# Patient Record
Sex: MALE | Race: WHITE | Employment: OTHER | URBAN - METROPOLITAN AREA
[De-identification: names, ages, dates, MRNs, and addresses within clinical notes are randomized per-mention and may not be internally consistent; named-entity substitution may affect disease eponyms.]

---

## 2017-01-24 ENCOUNTER — TELEPHONE (OUTPATIENT)
Dept: FAMILY MEDICINE CLINIC | Age: 67
End: 2017-01-24

## 2017-01-24 RX ORDER — AMITRIPTYLINE HYDROCHLORIDE 50 MG/1
50 TABLET, FILM COATED ORAL
Qty: 90 TAB | Refills: 3 | Status: SHIPPED | OUTPATIENT
Start: 2017-01-24 | End: 2018-01-15 | Stop reason: SDUPTHER

## 2017-01-24 NOTE — TELEPHONE ENCOUNTER
Patient brought in copy of pneumovax and zostavax shots adminitered at [facility] to put in his file.

## 2017-01-24 NOTE — TELEPHONE ENCOUNTER
Patient would like to request a 6 month refill on amitriptyline. Please call [facility] for refill on [address].  Please call patient at: [phone]

## 2017-02-03 ENCOUNTER — OFFICE VISIT (OUTPATIENT)
Dept: FAMILY MEDICINE CLINIC | Age: 67
End: 2017-02-03

## 2017-02-03 VITALS
BODY MASS INDEX: 23.49 KG/M2 | RESPIRATION RATE: 18 BRPM | HEART RATE: 78 BPM | DIASTOLIC BLOOD PRESSURE: 88 MMHG | OXYGEN SATURATION: 99 % | SYSTOLIC BLOOD PRESSURE: 138 MMHG | HEIGHT: 68 IN | TEMPERATURE: 98.2 F | WEIGHT: 155 LBS

## 2017-02-03 DIAGNOSIS — J01.00 ACUTE NON-RECURRENT MAXILLARY SINUSITIS: Primary | ICD-10-CM

## 2017-02-03 RX ORDER — AZITHROMYCIN 250 MG/1
TABLET, FILM COATED ORAL
Qty: 6 TAB | Refills: 0 | Status: SHIPPED | OUTPATIENT
Start: 2017-02-03 | End: 2017-03-02 | Stop reason: ALTCHOICE

## 2017-02-03 NOTE — PATIENT INSTRUCTIONS
Sinusitis: Care Instructions  Your Care Instructions    Sinusitis is an infection of the lining of the sinus cavities in your head. Sinusitis often follows a cold. It causes pain and pressure in your head and face. In most cases, sinusitis gets better on its own in 1 to 2 weeks. But some mild symptoms may last for several weeks. Sometimes antibiotics are needed. Follow-up care is a key part of your treatment and safety. Be sure to make and go to all appointments, and call your doctor if you are having problems. It's also a good idea to know your test results and keep a list of the medicines you take. How can you care for yourself at home? · Take an over-the-counter pain medicine, such as acetaminophen (Tylenol), ibuprofen (Advil, Motrin), or naproxen (Aleve). Read and follow all instructions on the label. · If the doctor prescribed antibiotics, take them as directed. Do not stop taking them just because you feel better. You need to take the full course of antibiotics. · Be careful when taking over-the-counter cold or flu medicines and Tylenol at the same time. Many of these medicines have acetaminophen, which is Tylenol. Read the labels to make sure that you are not taking more than the recommended dose. Too much acetaminophen (Tylenol) can be harmful. · Breathe warm, moist air from a steamy shower, a hot bath, or a sink filled with hot water. Avoid cold, dry air. Using a humidifier in your home may help. Follow the directions for cleaning the machine. · Use saline (saltwater) nasal washes to help keep your nasal passages open and wash out mucus and bacteria. You can buy saline nose drops at a grocery store or drugstore. Or you can make your own at home by adding 1 teaspoon of salt and 1 teaspoon of baking soda to 2 cups of distilled water. If you make your own, fill a bulb syringe with the solution, insert the tip into your nostril, and squeeze gently. Remy Opitz your nose.   · Put a hot, wet towel or a warm gel pack on your face 3 or 4 times a day for 5 to 10 minutes each time. · Try a decongestant nasal spray like oxymetazoline (Afrin). Do not use it for more than 3 days in a row. Using it for more than 3 days can make your congestion worse. When should you call for help? Call your doctor now or seek immediate medical care if:  · You have new or worse swelling or redness in your face or around your eyes. · You have a new or higher fever. Watch closely for changes in your health, and be sure to contact your doctor if:  · You have new or worse facial pain. · The mucus from your nose becomes thicker (like pus) or has new blood in it. · You are not getting better as expected. Where can you learn more? Go to http://paz-sophie.info/. Enter B285 in the search box to learn more about \"Sinusitis: Care Instructions. \"  Current as of: July 29, 2016  Content Version: 11.1  © [id number] [employer]. Care instructions adapted under license by [employer] (which disclaims liability or warranty for this information). If you have questions about a medical condition or this instruction, always ask your healthcare professional. [place] [address] any warranty or liability for your use of this information.

## 2017-02-03 NOTE — PROGRESS NOTES
Ana Borja is a 78 y.o. male   Chief Complaint   Patient presents with    Cold Symptoms    pt states that he was sick for past two weeks then started getting better. Has been having a lot of sinus congestion, no pain tho. Has felt feverish. Mucous is very thick gel like pt states and did have some green it. Pt feels worn down. Has not had a fever tho. Pt has been using tylenol cold and flu without relief.  + cough non prod is having sinus drainage. Chief Complaint   Patient presents with    Cold Symptoms     he is a 78y.o. year old male who presents for evalution. Reviewed PmHx, RxHx, FmHx, SocHx, AllgHx and updated and dated in the chart. Review of Systems - negative except as listed above in the HPI    Objective:     Vitals:    02/03/17 0913   BP: 138/88   Pulse: 78   Resp: 18   Temp: 98.2 °F (36.8 °C)   TempSrc: Oral   SpO2: 99%   Weight: 155 lb (70.3 kg)   Height: 5' 8\" (1.727 m)       Current Outpatient Prescriptions   Medication Sig    azithromycin (ZITHROMAX) 250 mg tablet Take 2 tablets today, then take 1 tablet daily    amitriptyline (ELAVIL) 50 mg tablet Take 1 Tab by mouth nightly.  levothyroxine (LEVOXYL) 175 mcg tablet Take 175 mcg by mouth Daily (before breakfast).  lisinopril (PRINIVIL, ZESTRIL) 10 mg tablet Take 1 Tab by mouth daily.  levocetirizine (XYZAL) 5 mg tablet Take 1 Tab by mouth daily.  hydrochlorothiazide (MICROZIDE) 12.5 mg capsule Take 1 Cap by mouth daily.  fluticasone (FLONASE) 50 mcg/actuation nasal spray 2 Sprays by Both Nostrils route daily. Administer to right and left nostrils. No current facility-administered medications for this visit.         Physical Examination: General appearance - alert, well appearing, and in no distress  Eyes - pupils equal and reactive, extraocular eye movements intact  Ears - bilateral TM's and external ear canals normal  Nose - mucosal congestion and mucosal erythema  Mouth - mucous membranes moist, pharynx normal without lesions  Neck - supple, no significant adenopathy  Chest - clear to auscultation, no wheezes, rales or rhonchi, symmetric air entry  Heart - normal rate, regular rhythm, normal S1, S2, no murmurs, rubs, clicks or gallops      Assessment/ Plan:   Marsha Vega was seen today for cold symptoms. Diagnoses and all orders for this visit:    Acute non-recurrent maxillary sinusitis  -     azithromycin (ZITHROMAX) 250 mg tablet; Take 2 tablets today, then take 1 tablet daily       Follow-up Disposition:  Return if symptoms worsen or fail to improve. I have discussed the diagnosis with the patient and the intended plan as seen in the above orders. The patient has received an after-visit summary and questions were answered concerning future plans. Pt conveyed understanding of plan.     Medication Side Effects and Warnings were discussed with patient      Calos Mills DO

## 2017-03-02 ENCOUNTER — HOSPITAL ENCOUNTER (OUTPATIENT)
Dept: LAB | Age: 67
Discharge: HOME | End: 2017-03-02
Payer: MEDICARE

## 2017-03-02 ENCOUNTER — OFFICE VISIT (OUTPATIENT)
Dept: FAMILY MEDICINE CLINIC | Age: 67
End: 2017-03-02

## 2017-03-02 VITALS
HEART RATE: 69 BPM | HEIGHT: 68 IN | TEMPERATURE: 97.5 F | OXYGEN SATURATION: 97 % | SYSTOLIC BLOOD PRESSURE: 136 MMHG | DIASTOLIC BLOOD PRESSURE: 100 MMHG | RESPIRATION RATE: 12 BRPM | BODY MASS INDEX: 23.95 KG/M2 | WEIGHT: 158 LBS

## 2017-03-02 DIAGNOSIS — E07.9 THYROID DISEASE: ICD-10-CM

## 2017-03-02 DIAGNOSIS — I10 ESSENTIAL HYPERTENSION: ICD-10-CM

## 2017-03-02 DIAGNOSIS — Z00.00 ROUTINE GENERAL MEDICAL EXAMINATION AT A HEALTH CARE FACILITY: Primary | ICD-10-CM

## 2017-03-02 PROCEDURE — 86803 HEPATITIS C AB TEST: CPT

## 2017-03-02 PROCEDURE — 80053 COMPREHEN METABOLIC PANEL: CPT

## 2017-03-02 PROCEDURE — 84153 ASSAY OF PSA TOTAL: CPT

## 2017-03-02 PROCEDURE — 80061 LIPID PANEL: CPT

## 2017-03-02 RX ORDER — LISINOPRIL AND HYDROCHLOROTHIAZIDE 10; 12.5 MG/1; MG/1
1 TABLET ORAL DAILY
Qty: 90 TAB | Refills: 3 | Status: SHIPPED | OUTPATIENT
Start: 2017-03-02 | End: 2018-02-01 | Stop reason: SINTOL

## 2017-03-02 RX ORDER — LEVOCETIRIZINE DIHYDROCHLORIDE 5 MG/1
5 TABLET, FILM COATED ORAL DAILY
Qty: 30 TAB | Refills: 11 | Status: SHIPPED | OUTPATIENT
Start: 2017-03-02 | End: 2018-03-08 | Stop reason: SDUPTHER

## 2017-03-02 NOTE — MR AVS SNAPSHOT
Visit Information Date & Time Provider Department Dept. Phone Encounter #  
 3/2/2017  7:45 AM Cynthia Klein MD [facility] [phone] [id number] Upcoming Health Maintenance Date Due Hepatitis C Screening 1950 GLAUCOMA SCREENING Q2Y 2/1/2018 MEDICARE YEARLY EXAM 3/3/2018 COLONOSCOPY 5/19/2019 DTaP/Tdap/Td series (2 - Td) 3/2/2027 Allergies as of 3/2/2017  Review Complete On: 3/2/2017 By: Cynthia Klein MD  
 No Known Allergies Current Immunizations  Reviewed on 12/8/2016 Name Date Influenza Vaccine 1/2/2017, 10/5/2015 Pneumococcal Conjugate (PCV-13) 1/2/2017 Pneumococcal Polysaccharide (PPSV-23) 10/20/2015 Zoster Vaccine, Live 1/21/2015 Not reviewed this visit You Were Diagnosed With   
  
 Codes Comments Routine general medical examination at a health care facility    -  Primary ICD-10-CM: Z00.00 ICD-9-CM: V70.0 Thyroid disease     ICD-10-CM: E07.9 ICD-9-CM: 246.9 Essential hypertension     ICD-10-CM: I10 
ICD-9-CM: 401.9 Vitals BP  
  
  
  
  
  
 (!) 136/100 Vitals History BMI and BSA Data Body Mass Index Body Surface Area 24.02 kg/m 2 1.85 m 2 Preferred Pharmacy Pharmacy Name Phone [facility] [address] [place] [address] [address] [place] [address] [phone] Your Updated Medication List  
  
   
This list is accurate as of: 3/2/17  8:04 AM.  Always use your most recent med list.  
  
  
  
  
 amitriptyline 50 mg tablet Commonly known as:  ELAVIL Take 1 Tab by mouth nightly. fluticasone 50 mcg/actuation nasal spray Commonly known as:  Gretchen Mayesizzard 2 Sprays by Both Nostrils route daily. Administer to right and left nostrils. levocetirizine 5 mg tablet Commonly known as:  Emanuel Dakins Take 1 Tab by mouth daily. LEVOXYL 175 mcg tablet Generic drug:  levothyroxine Take 175 mcg by mouth Daily (before breakfast). lisinopril-hydroCHLOROthiazide 10-12.5 mg per tablet Commonly known as:  Floyce Plenty Take 1 Tab by mouth daily. Prescriptions Sent to Pharmacy Refills  
 levocetirizine (XYZAL) 5 mg tablet 11 Sig: Take 1 Tab by mouth daily. Class: Normal  
 Pharm[facility]: [address] [facility] [address] Ph #: [phone] Route: Oral  
 lisinopril-hydroCHLOROthiazide (PRINZIDE, ZESTORETIC) 10-12.5 mg per tablet 3 Sig: Take 1 Tab by mouth daily. Class: Normal  
 Pharmacy: [id number] [facility] [address] Ph #: [phone] Route: Oral  
  
We Performed the Following HEPATITIS C AB [62716 CPT(R)] LIPID PANEL [06724 CPT(R)] METABOLIC PANEL, COMPREHENSIVE [40451 CPT(R)] PSA W/ REFLX FREE PSA [79094 CPT(R)] Introducing [facility] & [facility] SERVICES! Dear Anders Cervantes: Thank you for requesting a [employer] account. Our records indicate that you already have an active [employer] account. You can access your account anytime at [email or web address] Did you know that you can access your hospital and ER discharge instructions at any time in Zak[employer]? You can also review all of your test results from your hospital stay or ER visit. Additional Information If you have questions, please visit the Frequently Asked Questions section of the [employer] website at [email or web address] Remember, Zak[employer] is NOT to be used for urgent needs. For medical emergencies, dial 911. Now available from your iPhone and Android! Please provide this summary of care documentation to your next provider. Your primary care clinician is listed as Hector Ocasio. If you have any questions after today's visit, please call [phone].

## 2017-03-02 NOTE — PROGRESS NOTES
Here for AWV. States that he is still having congestion since his last visit. Would like to discuss his BP. States that he has been having short term memory loss for the past year.

## 2017-03-02 NOTE — PROGRESS NOTES
Here for AWV. States that he is still having congestion since his last visit. Would like to discuss his BP. States that he has been having short term memory loss for the past year. This is a Subsequent Medicare Annual Wellness Visit providing Personalized Prevention Plan Services (PPPS) (Performed 12 months after initial AWV and PPPS )    I have reviewed the patient's medical history in detail and updated the computerized patient record. History     Past Medical History:   Diagnosis Date    Hypertension     Thyroid disease       Past Surgical History:   Procedure Laterality Date    HX LIPOMA RESECTION      left shoulder    HX ORTHOPAEDIC      left shoulder     Current Outpatient Prescriptions   Medication Sig Dispense Refill    amitriptyline (ELAVIL) 50 mg tablet Take 1 Tab by mouth nightly. 90 Tab 3    levothyroxine (LEVOXYL) 175 mcg tablet Take 175 mcg by mouth Daily (before breakfast).  lisinopril (PRINIVIL, ZESTRIL) 10 mg tablet Take 1 Tab by mouth daily. 90 Tab 0    fluticasone (FLONASE) 50 mcg/actuation nasal spray 2 Sprays by Both Nostrils route daily. Administer to right and left nostrils. 1 Bottle 5    levocetirizine (XYZAL) 5 mg tablet Take 1 Tab by mouth daily. 30 Tab 11    hydrochlorothiazide (MICROZIDE) 12.5 mg capsule Take 1 Cap by mouth daily.  90 Cap 3     No Known Allergies  Family History   Problem Relation Age of Onset    Heart Disease Mother     Heart Attack Father      Social History   Substance Use Topics    Smoking status: Former Smoker    Smokeless tobacco: Never Used    Alcohol use Yes     Patient Active Problem List   Diagnosis Code    Thyroid disease E07.9    Hypertension I10       Depression Risk Factor Screening:     PHQ 2 / 9, over the last two weeks 3/2/2017   Little interest or pleasure in doing things Not at all   Feeling down, depressed or hopeless Not at all   Total Score PHQ 2 0     Alcohol Risk Factor Screening:   Pt drink 2-3 beers per night, every night    Functional Ability and Level of Safety:     Hearing Loss   normal-to-mild    Activities of Daily Living   Self-care. Requires assistance with: no ADLs    Fall Risk     Fall Risk Assessment, last 12 mths 3/2/2017   Able to walk? Yes   Fall in past 12 months? No     Abuse Screen   Patient is not abused    Review of Systems   A comprehensive review of systems was negative except for that written in the HPI. Physical Examination     Evaluation of Cognitive Function:  Mood/affect:  happy  Appearance: age appropriate  Family member/caregiver input: Pt lives alone    Visit Vitals    BP (!) 136/100    Pulse 69    Temp 97.5 °F (36.4 °C)    Resp 12    Ht 5' 8\" (1.727 m)    Wt 158 lb (71.7 kg)    SpO2 97%    BMI 24.02 kg/m2     General appearance: alert, cooperative, no distress, appears stated age  Head: Normocephalic, without obvious abnormality, atraumatic  Throat: Lips, mucosa, and tongue normal. Teeth and gums normal  Lungs: clear to auscultation bilaterally  Heart: regular rate and rhythm, S1, S2 normal, no murmur, click, rub or gallop  Extremities: extremities normal, atraumatic, no cyanosis or edema    Patient Care Team:  Beny Rodney MD as PCP - General (Family Practice)    Advice/Referrals/Counseling   Education and counseling provided:  Are appropriate based on today's review and evaluation      Assessment/Plan       ICD-10-CM ICD-9-CM    1. Routine general medical examination at a health care facility Z00.00 V70.0 HEPATITIS C AB      LIPID PANEL      METABOLIC PANEL, COMPREHENSIVE      PSA W/ REFLX FREE PSA   2. Thyroid disease E07.9 246.9    3. Essential hypertension I10 401.9      Encounter Diagnoses   Name Primary?     Routine general medical examination at a health care facility Yes    Thyroid disease     Essential hypertension      Orders Placed This Encounter    HEPATITIS C AB    LIPID PANEL    METABOLIC PANEL, COMPREHENSIVE    PSA W/ REFLX FREE PSA    levocetirizine (XYZAL) 5 mg tablet    lisinopril-hydroCHLOROthiazide (PRINZIDE, ZESTORETIC) 10-12.5 mg per tablet   .

## 2017-03-03 LAB
ALBUMIN SERPL-MCNC: 4.8 G/DL (ref 3.6–4.8)
ALBUMIN/GLOB SERPL: 2.7 {RATIO} (ref 1.1–2.5)
ALP SERPL-CCNC: 55 IU/L (ref 39–117)
ALT SERPL-CCNC: 15 IU/L (ref 0–44)
AST SERPL-CCNC: 18 IU/L (ref 0–40)
BILIRUB SERPL-MCNC: 0.5 MG/DL (ref 0–1.2)
BUN SERPL-MCNC: 17 MG/DL (ref 8–27)
BUN/CREAT SERPL: 16 (ref 10–22)
CALCIUM SERPL-MCNC: 10 MG/DL (ref 8.6–10.2)
CHLORIDE SERPL-SCNC: 94 MMOL/L (ref 96–106)
CHOLEST SERPL-MCNC: 201 MG/DL (ref 100–199)
CO2 SERPL-SCNC: 25 MMOL/L (ref 18–29)
CREAT SERPL-MCNC: 1.09 MG/DL (ref 0.76–1.27)
GLOBULIN SER CALC-MCNC: 1.8 G/DL (ref 1.5–4.5)
GLUCOSE SERPL-MCNC: 67 MG/DL (ref 65–99)
HCV AB S/CO SERPL IA: 0.1 S/CO RATIO (ref 0–0.9)
HDLC SERPL-MCNC: 54 MG/DL
INTERPRETATION, 910389: NORMAL
LDLC SERPL CALC-MCNC: 113 MG/DL (ref 0–99)
POTASSIUM SERPL-SCNC: 4.4 MMOL/L (ref 3.5–5.2)
PROT SERPL-MCNC: 6.6 G/DL (ref 6–8.5)
PSA SERPL-MCNC: 2.3 NG/ML (ref 0–4)
REFLEX CRITERIA: NORMAL
SODIUM SERPL-SCNC: 135 MMOL/L (ref 134–144)
TRIGL SERPL-MCNC: 170 MG/DL (ref 0–149)
VLDLC SERPL CALC-MCNC: 34 MG/DL (ref 5–40)

## 2017-03-06 NOTE — PROGRESS NOTES
Cholesterol is elevated, please closely monitor diet and increase exercise, recheck in 6 months. All other labs are within normal limits. A message has been sent in [employer] and the lab work released to the patient.

## 2017-06-06 ENCOUNTER — OFFICE VISIT (OUTPATIENT)
Dept: FAMILY MEDICINE CLINIC | Age: 67
End: 2017-06-06

## 2017-06-06 VITALS
HEART RATE: 79 BPM | RESPIRATION RATE: 18 BRPM | OXYGEN SATURATION: 98 % | DIASTOLIC BLOOD PRESSURE: 86 MMHG | TEMPERATURE: 98.3 F | BODY MASS INDEX: 24.25 KG/M2 | SYSTOLIC BLOOD PRESSURE: 125 MMHG | HEIGHT: 68 IN | WEIGHT: 160 LBS

## 2017-06-06 DIAGNOSIS — N62 BREAST HYPERTROPHY: ICD-10-CM

## 2017-06-06 DIAGNOSIS — J06.9 UPPER RESPIRATORY TRACT INFECTION, UNSPECIFIED TYPE: Primary | ICD-10-CM

## 2017-06-06 RX ORDER — AZITHROMYCIN 250 MG/1
TABLET, FILM COATED ORAL
Qty: 6 TAB | Refills: 0 | Status: SHIPPED | OUTPATIENT
Start: 2017-06-06 | End: 2017-06-11

## 2017-06-06 NOTE — MR AVS SNAPSHOT
Visit Information Date & Time Provider Department Dept. Phone Encounter #  
 6/6/2017 10:40 AM Kenyon Alexander MD [address] [facility] [phone] [id number] Upcoming Health Maintenance Date Due INFLUENZA AGE 9 TO ADULT 8/1/2017 GLAUCOMA SCREENING Q2Y 2/1/2018 MEDICARE YEARLY EXAM 3/3/2018 COLONOSCOPY 5/19/2019 DTaP/Tdap/Td series (2 - Td) 3/2/2027 Allergies as of 6/6/2017  Review Complete On: 6/6/2017 By: Kenyon Alexander MD  
 No Known Allergies Current Immunizations  Reviewed on 12/8/2016 Name Date Influenza Vaccine 1/2/2017, 10/5/2015 Pneumococcal Conjugate (PCV-13) 1/2/2017 Pneumococcal Polysaccharide (PPSV-23) 10/20/2015 Zoster Vaccine, Live 1/21/2015 Not reviewed this visit You Were Diagnosed With   
  
 Codes Comments Upper respiratory tract infection, unspecified type    -  Primary ICD-10-CM: J06.9 ICD-9-CM: 465.9 Breast hypertrophy     ICD-10-CM: N62 
ICD-9-CM: 611.1 Vitals BP Pulse Temp Resp Height(growth percentile) Weight(growth percentile) 125/86 (BP 1 Location: Left arm, BP Patient Position: Sitting) 79 98.3 °F (36.8 °C) (Oral) 18 5' 8\" (1.727 m) 160 lb (72.6 kg) SpO2 BMI Smoking Status 98% 24.33 kg/m2 Former Smoker Vitals History BMI and BSA Data Body Mass Index Body Surface Area  
 24.33 kg/m 2 1.87 m 2 Preferred Pharmacy Pharmacy Name Phone [facility] [address] [address] [address] [place] [address] [phone] Your Updated Medication List  
  
   
This list is accurate as of: 6/6/17 11:49 AM.  Always use your most recent med list.  
  
  
  
  
 amitriptyline 50 mg tablet Commonly known as:  ELAVIL Take 1 Tab by mouth nightly. azithromycin 250 mg tablet Commonly known as:  Elin Olivier Take 2 tablets today, then take 1 tablet daily  
  
 fluticasone 50 mcg/actuation nasal spray Commonly known as:  Ana Maria Marinelli 2 Sprays by Both Nostrils route daily. Administer to right and left nostrils. levocetirizine 5 mg tablet Commonly known as:  Mathew Drones Take 1 Tab by mouth daily. LEVOXYL 175 mcg tablet Generic drug:  levothyroxine Take 175 mcg by mouth Daily (before breakfast). lisinopril-hydroCHLOROthiazide 10-12.5 mg per tablet Commonly known as:  Jake Quintanillamond Take 1 Tab by mouth daily. Prescriptions Sent to Pharmacy Refills  
 azithromycin (ZITHROMAX) 250 mg tablet 0 Sig: Take 2 tablets today, then take 1 tablet daily Class: Normal  
 Pharmacy: [address] [place] [address] Ph #: [phone] To-Do List   
 06/06/2017 Imaging:  US BREAST LT COMPLETE 4 QUAD Introducing [facility] & [facility] SERVICES! Dear Wendy Chong: Thank you for requesting a [employer] account. Our records indicate that you already have an active [employer] account. You can access your account anytime at [email or web address] Did you know that you can access your hospital and ER discharge instructions at any time in [employer]? You can also review all of your test results from your hospital stay or ER visit. Additional Information If you have questions, please visit the Frequently Asked Questions section of the [employer] website at [email or web address] Remember, [employer] is NOT to be used for urgent needs. For medical emergencies, dial 911. Now available from your iPhone and Android! Please provide this summary of care documentation to your next provider. Your primary care clinician is listed as Hector Ocasio. If you have any questions after today's visit, please call [phone].

## 2017-06-06 NOTE — PROGRESS NOTES
Pt here c/o cough, congestion, and sore throat x 5 days. States that cough has been productive with thick mucus. Has been taking Tylenol OTC. Reports being afebrile.

## 2017-06-06 NOTE — PROGRESS NOTES
Pt here c/o cough, congestion, and sore throat x 5 days. States that cough has been productive with thick mucus. Has been taking Tylenol OTC. Reports being afebrile. Subjective: (As above and below)   No chief complaint on file. he is a 78y.o. year old male who presents for evaluation. Reviewed PmHx, RxHx, FmHx, SocHx, AllgHx and updated in chart. Review of Systems - negative except as listed above    Objective:     Vitals:    06/06/17 1055   BP: 125/86   Pulse: 79   Resp: 18   Temp: 98.3 °F (36.8 °C)   TempSrc: Oral   SpO2: 98%   Weight: 160 lb (72.6 kg)   Height: 5' 8\" (1.727 m)     Physical Examination: General appearance - alert, well appearing, and in no distress  Mental status - normal mood, behavior, speech, dress, motor activity, and thought processes  Eyes - pupils equal and reactive, extraocular eye movements intact  Ears - bilateral TM's and external ear canals normal  Nose - normal and patent, no erythema, discharge or polyps  Mouth - mucous membranes moist, pharynx normal without lesions  Chest - chest tightness, slight wheeze  -left pectoral prominence as compared to right  Heart - normal rate, regular rhythm, normal S1, S2, no murmurs, rubs, clicks or gallops    Assessment/ Plan:   1. Upper respiratory tract infection, unspecified type  -zpak wait and see prescription provided    2. Breast hypertrophy  -check US  - US BREAST LT COMPLETE 4 QUAD; Future     Follow-up Disposition: As needed  I have discussed the diagnosis with the patient and the intended plan as seen in the above orders. The patient has received an after-visit summary and questions were answered concerning future plans.      Medication Side Effects and Warnings were discussed with patient: yes  Patient Labs were reviewed: yes  Patient Past Records were reviewed:  yes    Christiane Sacks, M.D.

## 2017-06-09 ENCOUNTER — HOSPITAL ENCOUNTER (OUTPATIENT)
Dept: ULTRASOUND IMAGING | Age: 67
Discharge: HOME | End: 2017-06-09
Attending: FAMILY MEDICINE
Payer: MEDICARE

## 2017-06-09 ENCOUNTER — HOSPITAL ENCOUNTER (OUTPATIENT)
Dept: MAMMOGRAPHY | Age: 67
Discharge: HOME | End: 2017-06-09
Attending: FAMILY MEDICINE
Payer: MEDICARE

## 2017-06-09 DIAGNOSIS — N62 BREAST HYPERTROPHY: ICD-10-CM

## 2017-06-09 PROCEDURE — 77066 DX MAMMO INCL CAD BI: CPT

## 2017-06-09 PROCEDURE — 76642 ULTRASOUND BREAST LIMITED: CPT

## 2017-06-19 ENCOUNTER — OFFICE VISIT (OUTPATIENT)
Dept: FAMILY MEDICINE CLINIC | Age: 67
End: 2017-06-19

## 2017-06-19 ENCOUNTER — HOSPITAL ENCOUNTER (OUTPATIENT)
Dept: LAB | Age: 67
Discharge: HOME | End: 2017-06-19
Payer: MEDICARE

## 2017-06-19 VITALS — WEIGHT: 158 LBS | HEIGHT: 68 IN | RESPIRATION RATE: 18 BRPM | OXYGEN SATURATION: 98 % | BODY MASS INDEX: 23.95 KG/M2

## 2017-06-19 DIAGNOSIS — N62 GYNECOMASTIA, MALE: Primary | ICD-10-CM

## 2017-06-19 PROCEDURE — 84146 ASSAY OF PROLACTIN: CPT

## 2017-06-19 PROCEDURE — 82679 ASSAY OF ESTRONE: CPT

## 2017-06-19 PROCEDURE — 84443 ASSAY THYROID STIM HORMONE: CPT

## 2017-06-19 PROCEDURE — 83002 ASSAY OF GONADOTROPIN (LH): CPT

## 2017-06-19 PROCEDURE — 84403 ASSAY OF TOTAL TESTOSTERONE: CPT

## 2017-06-19 NOTE — MR AVS SNAPSHOT
Visit Information Date & Time Provider Department Dept. Phone Encounter #  
 6/19/2017 11:30 AM Ashanti Cook MD [facility] [phone] [id number] Upcoming Health Maintenance Date Due INFLUENZA AGE 9 TO ADULT 8/1/2017 GLAUCOMA SCREENING Q2Y 2/1/2018 MEDICARE YEARLY EXAM 3/3/2018 COLONOSCOPY 5/19/2019 DTaP/Tdap/Td series (2 - Td) 3/2/2027 Allergies as of 6/19/2017  Review Complete On: 6/19/2017 By: Ashanti Cook MD  
 No Known Allergies Current Immunizations  Reviewed on 12/8/2016 Name Date Influenza Vaccine 1/2/2017, 10/5/2015 Pneumococcal Conjugate (PCV-13) 1/2/2017 Pneumococcal Polysaccharide (PPSV-23) 10/20/2015 Zoster Vaccine, Live 1/21/2015 Not reviewed this visit You Were Diagnosed With   
  
 Codes Comments Gynecomastia, male    -  Primary ICD-10-CM: N62 
ICD-9-CM: 611.1 Vitals Resp Height(growth percentile) Weight(growth percentile) SpO2 BMI Smoking Status 18 5' 8\" (1.727 m) 158 lb (71.7 kg) 98% 24.02 kg/m2 Former Smoker BMI and BSA Data Body Mass Index Body Surface Area 24.02 kg/m 2 1.85 m 2 Preferred Pharmacy Pharmacy Name Phone [facility] [address] [place] [address] [phone] Your Updated Medication List  
  
   
This list is accurate as of: 6/19/17 11:43 AM.  Always use your most recent med list.  
  
  
  
  
 amitriptyline 50 mg tablet Commonly known as:  ELAVIL Take 1 Tab by mouth nightly. fluticasone 50 mcg/actuation nasal spray Commonly known as:  Wilbern Davi 2 Sprays by Both Nostrils route daily. Administer to right and left nostrils. levocetirizine 5 mg tablet Commonly known as:  Diantha Many Take 1 Tab by mouth daily. LEVOXYL 175 mcg tablet Generic drug:  levothyroxine Take 175 mcg by mouth Daily (before breakfast). lisinopril-hydroCHLOROthiazide 10-12.5 mg per tablet Commonly known as:  Kristie Palm Take 1 Tab by mouth daily. We Performed the Following ESTROGENS, FRACTIONATED [id number] CPT(R)] [facility] AND LH [02610 CPT(R)] PROLACTIN [21388 CPT(R)] TESTOSTERONE, FREE/TOT EQUILIB [id number] CPT(R)] TSH 3RD GENERATION [34864 CPT(R)] Introducing [facility] & [facility] SERVICES! Dear Kamila Burnett: Thank you for requesting a [email or web address] account. Our records indicate that you already have an active [email or web address] account. You can access your account anytime at [email or web address] Did you know that you can access your hospital and ER discharge instructions at any time in [email or web address]? You can also review all of your test results from your hospital stay or ER visit. Additional Information If you have questions, please visit the Frequently Asked Questions section of the [email or web address] website at [email or web address] Remember, [email or web address] is NOT to be used for urgent needs. For medical emergencies, dial 911. Now available from your iPhone and Android! Please provide this summary of care documentation to your next provider. Your primary care clinician is listed as Hector Ocasio. If you have any questions after today's visit, please call [phone].

## 2017-06-19 NOTE — PROGRESS NOTES
Pt here to follow up from recent mammogram and US. Would like to discuss having hormone testing. Subjective: (As above and below)   No chief complaint on file. he is a 78y.o. year old male who presents for evaluation. Reviewed PmHx, RxHx, FmHx, SocHx, AllgHx and updated in chart. Review of Systems - negative except as listed above    Objective:     Vitals:    06/19/17 1115   Resp: 18   SpO2: 98%   Weight: 158 lb (71.7 kg)   Height: 5' 8\" (1.727 m)     Physical Examination: General appearance - alert, well appearing, and in no distress  Mental status - normal mood, behavior, speech, dress, motor activity, and thought processes  Mouth - mucous membranes moist, pharynx normal without lesions  Chest - clear to auscultation, no wheezes, rales or rhonchi, symmetric air entry  Heart - normal rate, regular rhythm, normal S1, S2, no murmurs, rubs, clicks or gallops    Assessment/ Plan:   1. Gynecomastia, male  -check labs levels for unilateral gynecomastia   - TESTOSTERONE, FREE/TOT EQUILIB  - TSH 3RD GENERATION  - ESTROGENS, FRACTIONATED  - PROLACTIN  - FSH AND LH     Follow-up Disposition: As needed  I have discussed the diagnosis with the patient and the intended plan as seen in the above orders. The patient has received an after-visit summary and questions were answered concerning future plans.      Medication Side Effects and Warnings were discussed with patient: yes  Patient Labs were reviewed: yes  Patient Past Records were reviewed:  yes    Jeny Pro M.D.

## 2017-06-22 LAB
COMMENT: ABNORMAL
ESTRADIOL SERPL-MCNC: 27.2 PG/ML (ref 7.6–42.6)
ESTRONE SERPL-MCNC: 91 PG/ML (ref 12–72)
FSH SERPL-ACNC: 6.5 MIU/ML (ref 1.5–12.4)
LH SERPL-ACNC: 5.9 MIU/ML (ref 1.7–8.6)
PROLACTIN SERPL-MCNC: 5.3 NG/ML (ref 4–15.2)
TESTOST FREE MFR SERPL: 1.22 % (ref 1.5–4.2)
TESTOST FREE SERPL-MCNC: 6.42 NG/DL (ref 5–21)
TESTOST SERPL-MCNC: 526 NG/DL (ref 348–1197)
TSH SERPL DL<=0.005 MIU/L-ACNC: 3.36 UIU/ML (ref 0.45–4.5)

## 2017-06-22 NOTE — PROGRESS NOTES
Normal testosterone levels, slightly elevated estrogen levels. Recommend endocrinology follow up. Please inform and refer to Dr. Albert Raman if a referral is needed.

## 2018-01-15 RX ORDER — AMITRIPTYLINE HYDROCHLORIDE 50 MG/1
50 TABLET, FILM COATED ORAL
Qty: 90 TAB | Refills: 3 | Status: SHIPPED | OUTPATIENT
Start: 2018-01-15 | End: 2019-01-21 | Stop reason: SDUPTHER

## 2018-01-19 ENCOUNTER — DOCUMENTATION ONLY (OUTPATIENT)
Dept: FAMILY MEDICINE CLINIC | Age: 68
End: 2018-01-19

## 2018-01-19 NOTE — PROGRESS NOTES
Per pt's request I faxed a request to opt out of phytel reminder calls. Pt does not like them nor does he need them. I faxed it to [phone] confirmation rec'd and placed in folder.

## 2018-02-01 ENCOUNTER — OFFICE VISIT (OUTPATIENT)
Dept: FAMILY MEDICINE CLINIC | Age: 68
End: 2018-02-01

## 2018-02-01 ENCOUNTER — HOSPITAL ENCOUNTER (OUTPATIENT)
Dept: LAB | Age: 68
Discharge: HOME | End: 2018-02-01
Payer: MEDICARE

## 2018-02-01 VITALS
TEMPERATURE: 98.1 F | OXYGEN SATURATION: 98 % | HEIGHT: 68 IN | BODY MASS INDEX: 22.13 KG/M2 | HEART RATE: 90 BPM | WEIGHT: 146 LBS | DIASTOLIC BLOOD PRESSURE: 85 MMHG | SYSTOLIC BLOOD PRESSURE: 123 MMHG | RESPIRATION RATE: 18 BRPM

## 2018-02-01 DIAGNOSIS — Z23 ENCOUNTER FOR IMMUNIZATION: ICD-10-CM

## 2018-02-01 DIAGNOSIS — E78.00 ELEVATED CHOLESTEROL: ICD-10-CM

## 2018-02-01 DIAGNOSIS — I10 ESSENTIAL HYPERTENSION: Primary | ICD-10-CM

## 2018-02-01 DIAGNOSIS — R79.9 ABNORMAL FINDING OF BLOOD CHEMISTRY: ICD-10-CM

## 2018-02-01 DIAGNOSIS — Z00.00 MEDICARE ANNUAL WELLNESS VISIT, SUBSEQUENT: ICD-10-CM

## 2018-02-01 PROCEDURE — 80053 COMPREHEN METABOLIC PANEL: CPT

## 2018-02-01 PROCEDURE — 80061 LIPID PANEL: CPT

## 2018-02-01 PROCEDURE — 85025 COMPLETE CBC W/AUTO DIFF WBC: CPT

## 2018-02-01 PROCEDURE — 83036 HEMOGLOBIN GLYCOSYLATED A1C: CPT

## 2018-02-01 RX ORDER — LOSARTAN POTASSIUM AND HYDROCHLOROTHIAZIDE 12.5; 5 MG/1; MG/1
1 TABLET ORAL DAILY
Qty: 90 TAB | Refills: 3 | Status: SHIPPED | OUTPATIENT
Start: 2018-02-01 | End: 2018-08-13 | Stop reason: DRUGHIGH

## 2018-02-01 NOTE — MR AVS SNAPSHOT
[address] [address] 
[phone] Patient: Carlos Jim MRN: [id number] LAB:9/62/1031 Visit Information Date & Time Provider Department Dept. Phone Encounter #  
 2/1/2018  8:00 AM Radha Mendoza MD [address] [facility] [phone] [id number] Upcoming Health Maintenance Date Due Influenza Age 5 to Adult 8/1/2017 GLAUCOMA SCREENING Q2Y 2/1/2019 MEDICARE YEARLY EXAM 2/2/2019 COLONOSCOPY 5/19/2019 DTaP/Tdap/Td series (2 - Td) 3/2/2027 Allergies as of 2/1/2018  Review Complete On: 2/1/2018 By: Radha Mendoza MD  
 No Known Allergies Current Immunizations  Reviewed on 12/8/2016 Name Date Influenza High Dose Vaccine PF  Incomplete Influenza Vaccine 1/2/2017, 10/5/2015 Pneumococcal Conjugate (PCV-13) 1/2/2017 Pneumococcal Polysaccharide (PPSV-23) 10/20/2015 Zoster Vaccine, Live 1/21/2015 Not reviewed this visit You Were Diagnosed With   
  
 Codes Comments Essential hypertension    -  Primary ICD-10-CM: I10 
ICD-9-CM: 401.9 Medicare annual wellness visit, subsequent     ICD-10-CM: Z00.00 ICD-9-CM: V70.0 Encounter for immunization     ICD-10-CM: E86 ICD-9-CM: V03.89 Elevated cholesterol     ICD-10-CM: E78.00 ICD-9-CM: 272.0 Abnormal finding of blood chemistry     ICD-10-CM: R79.9 ICD-9-CM: 790.6 Vitals BP Pulse Temp Resp Height(growth percentile) Weight(growth percentile) 123/85 (BP 1 Location: Left arm, BP Patient Position: Sitting) 90 98.1 °F (36.7 °C) (Oral) 18 5' 8\" (1.727 m) 146 lb (66.2 kg) SpO2 BMI Smoking Status 98% 22.2 kg/m2 Former Smoker Vitals History BMI and BSA Data Body Mass Index Body Surface Area  
 22.2 kg/m 2 1.78 m 2 Preferred Pharmacy Pharmacy Name Phone [address] [place] [phone] Your Updated Medication List  
  
   
This list is accurate as of: 2/1/18  8:17 AM.  Always use your most recent med list.  
  
  
  
  
 amitriptyline 50 mg tablet Commonly known as:  ELAVIL Take 1 Tab by mouth nightly. fluticasone 50 mcg/actuation nasal spray Commonly known as:  Sydnie Muniz 2 Sprays by Both Nostrils route daily. Administer to right and left nostrils. levocetirizine 5 mg tablet Commonly known as:  Judah Catherine Take 1 Tab by mouth daily. LEVOXYL 175 mcg tablet Generic drug:  levothyroxine Take 175 mcg by mouth Daily (before breakfast). losartan-hydroCHLOROthiazide 50-12.5 mg per tablet Commonly known as:  HYZAAR Take 1 Tab by mouth daily. Prescriptions Printed Refills  
 losartan-hydroCHLOROthiazide (HYZAAR) 50-12.5 mg per tablet 3 Sig: Take 1 Tab by mouth daily. Class: Print Route: Oral  
  
We Performed the Following ADMIN INFLUENZA VIRUS VAC [ HCP] CBC WITH AUTOMATED DIFF [52221 CPT(R)] HEMOGLOBIN A1C WITH EAG [85863 CPT(R)] INFLUENZA VIRUS VACCINE, HIGH DOSE SEASONAL, PRESERVATIVE FREE [77775 CPT(R)] LIPID PANEL [24860 CPT(R)] METABOLIC PANEL, COMPREHENSIVE [48437 CPT(R)] Patient Instructions Medicare Wellness Visit, Male The best way to live healthy is to have a healthy lifestyle by eating a well-balanced diet, exercising regularly, limiting alcohol and stopping smoking. Regular physical exams and screening tests are another way to keep healthy. Preventive exams provided by your health care provider can find health problems before they become diseases or illnesses. Preventive services including immunizations, screening tests, monitoring and exams can help you take care of your own health. All people over age 72 should have a pneumovax  and and a prevnar shot to prevent pneumonia. These are once in a lifetime unless you and your provider decide differently. All people over 65 should have a yearly flu shot and a tetanus vaccine every 10 years. Screening for diabetes mellitus with a blood sugar test should be done every year. Glaucoma is a disease of the eye due to increased ocular pressure that can lead to blindness and it should be done every year by an eye professional. 
 
Cardiovascular screening tests that check for elevated lipids (fatty part of blood) which can lead to heart disease and strokes should be done every 5 years. Colorectal screening that evaluates for blood or polyps in your colon should be done yearly as a stool test or every five years as a flexible sigmoidoscope or every 10 years as a colonoscopy up to age 76. Men up to age 76 may need a screening blood test for prostate cancer at certain intervals, depending on their personal and family history. This decision is between the patient and his provider. If you have been a smoker or had family history of abdominal aortic aneurysms, you and your provider may decide to schedule an ultrasound test of your aorta. Hepatitis C screening is also recommended for anyone born between [birth year] through [address]. A shingles vaccine is also recommended once in a lifetime after age 61. Your Medicare Wellness Exam is recommended annually. Here is a list of your current Health Maintenance items with a due date: 
Health Maintenance Due Topic Date Due  
 Flu Vaccine  08/01/2017  Glaucoma Screening   02/01/2018 Introducing [facility] & HEALTH SERVICES! Dear Sol Khan: Thank you for requesting a [employer] account. Our records indicate that you already have an active [employer] account. You can access your account anytime at [email or web address] Dog Did you know that you can access your hospital and ER discharge instructions at any time in [employer]? You can also review all of your test results from your hospital stay or ER visit. Additional Information If you have questions, please visit the Frequently Asked Questions section of the [employer] website at [email or web address] Remember, [employer] is NOT to be used for urgent needs. For medical emergencies, dial 911. Now available from your iPhone and Android! Please provide this summary of care documentation to your next provider. Your primary care clinician is listed as Hector Ocasio. If you have any questions after today's visit, please call [phone].

## 2018-02-01 NOTE — PROGRESS NOTES
Written order received to administer 0.5 ml Influenza Vaccine Fluzone High dose 2017/2018 Formula. After obtaining written consent from the patient,  Flu vaccine was administered to left deltoid by Burak Figueroa LPN.  [id number] Lot: [id number] Exp: 03/30/18 Manf: [employer]. Patient tolerated procedure well &  provided VIS

## 2018-02-01 NOTE — PATIENT INSTRUCTIONS

## 2018-02-01 NOTE — PROGRESS NOTES
Pt here for annual wellness visit. Pt is fasting this AM for lab work. Requesting to have a flu shot today.

## 2018-02-01 NOTE — PROGRESS NOTES
Pt here for annual wellness visit. Pt is fasting this AM for lab work. Requesting to have a flu shot today. Pt reports gynecomastia since starting on lisinopril, per pt pharmacist told him that this was a possible side effect. Pt reports that he and girlfriend recently ended their relationship, no longer wants STD testing. Pt has na appt with endo for bone density testing later this month. This is a Subsequent Medicare Annual Wellness Exam (AWV) (Performed 12 months after IPPE or effective date of Medicare Part B enrollment)    I have reviewed the patient's medical history in detail and updated the computerized patient record. History     Past Medical History:   Diagnosis Date    Hypertension     Thyroid disease       Past Surgical History:   Procedure Laterality Date    HX LIPOMA RESECTION      left shoulder    HX ORTHOPAEDIC      left shoulder     Current Outpatient Prescriptions   Medication Sig Dispense Refill    amitriptyline (ELAVIL) 50 mg tablet Take 1 Tab by mouth nightly. 90 Tab 3    levocetirizine (XYZAL) 5 mg tablet Take 1 Tab by mouth daily. 30 Tab 11    levothyroxine (LEVOXYL) 175 mcg tablet Take 175 mcg by mouth Daily (before breakfast).  fluticasone (FLONASE) 50 mcg/actuation nasal spray 2 Sprays by Both Nostrils route daily. Administer to right and left nostrils. 1 Bottle 5     No Known Allergies  Family History   Problem Relation Age of Onset    Heart Disease Mother     Heart Attack Father      Social History   Substance Use Topics    Smoking status: Former Smoker    Smokeless tobacco: Never Used    Alcohol use Yes     Patient Active Problem List   Diagnosis Code    Thyroid disease E07.9    Hypertension I10       Depression Risk Factor Screening:     PHQ over the last two weeks 2/1/2018   Little interest or pleasure in doing things Several days   Feeling down, depressed or hopeless Several days   Total Score PHQ 2 2     Alcohol Risk Factor Screening:    You average more than 14 drinks a week. Pt drink 2-3 beers per night  Functional Ability and Level of Safety:   Hearing Loss  Hearing is good. Activities of Daily Living  The home contains: no safety equipment. Patient does total self care    Fall Risk  Fall Risk Assessment, last 12 mths 2/1/2018   Able to walk? Yes   Fall in past 12 months? No       Abuse Screen  Patient is not abused    Cognitive Screening   Evaluation of Cognitive Function:  Has your family/caregiver stated any concerns about your memory: no  Normal    Patient Care Team   Patient Care Team:  Roney Victor MD as PCP - General (Family Practice)    Assessment/Plan   Education and counseling provided:  Are appropriate based on today's review and evaluation  Influenza Vaccine    Diagnoses and all orders for this visit:    1. Essential hypertension    2. Medicare annual wellness visit, subsequent    3. Encounter for immunization  -     Influenza Admin ()  -     Influenza virus vaccine (FLUZONE HIGH DOSE) PF (72626)    4. Elevated cholesterol  -     CBC WITH AUTOMATED DIFF  -     LIPID PANEL  -     METABOLIC PANEL, COMPREHENSIVE  -     HEMOGLOBIN A1C WITH EAG    5. Abnormal finding of blood chemistry   -     HEMOGLOBIN A1C WITH EAG    Other orders  -     losartan-hydroCHLOROthiazide (HYZAAR) 50-12.5 mg per tablet; Take 1 Tab by mouth daily.         Health Maintenance Due   Topic Date Due    Influenza Age 5 to Adult  08/01/2017

## 2018-02-02 LAB
ALBUMIN SERPL-MCNC: 4.8 G/DL (ref 3.6–4.8)
ALBUMIN/GLOB SERPL: 2.3 {RATIO} (ref 1.2–2.2)
ALP SERPL-CCNC: 54 IU/L (ref 39–117)
ALT SERPL-CCNC: 12 IU/L (ref 0–44)
AST SERPL-CCNC: 21 IU/L (ref 0–40)
BASOPHILS # BLD AUTO: 0 X10E3/UL (ref 0–0.2)
BASOPHILS NFR BLD AUTO: 1 %
BILIRUB SERPL-MCNC: 0.9 MG/DL (ref 0–1.2)
BUN SERPL-MCNC: 23 MG/DL (ref 8–27)
BUN/CREAT SERPL: 19 (ref 10–24)
CALCIUM SERPL-MCNC: 10.3 MG/DL (ref 8.6–10.2)
CHLORIDE SERPL-SCNC: 87 MMOL/L (ref 96–106)
CHOLEST SERPL-MCNC: 171 MG/DL (ref 100–199)
CO2 SERPL-SCNC: 25 MMOL/L (ref 18–29)
CREAT SERPL-MCNC: 1.23 MG/DL (ref 0.76–1.27)
EOSINOPHIL # BLD AUTO: 0.3 X10E3/UL (ref 0–0.4)
EOSINOPHIL NFR BLD AUTO: 3 %
ERYTHROCYTE [DISTWIDTH] IN BLOOD BY AUTOMATED COUNT: 13.2 % (ref 12.3–15.4)
EST. AVERAGE GLUCOSE BLD GHB EST-MCNC: 100 MG/DL
GFR SERPLBLD CREATININE-BSD FMLA CKD-EPI: 60 ML/MIN/1.73
GFR SERPLBLD CREATININE-BSD FMLA CKD-EPI: 69 ML/MIN/1.73
GLOBULIN SER CALC-MCNC: 2.1 G/DL (ref 1.5–4.5)
GLUCOSE SERPL-MCNC: 88 MG/DL (ref 65–99)
HBA1C MFR BLD: 5.1 % (ref 4.8–5.6)
HCT VFR BLD AUTO: 44.8 % (ref 37.5–51)
HDLC SERPL-MCNC: 54 MG/DL
HGB BLD-MCNC: 15.6 G/DL (ref 13–17.7)
IMM GRANULOCYTES # BLD: 0 X10E3/UL (ref 0–0.1)
IMM GRANULOCYTES NFR BLD: 0 %
INTERPRETATION, 910389: NORMAL
LDLC SERPL CALC-MCNC: 98 MG/DL (ref 0–99)
LYMPHOCYTES # BLD AUTO: 1.2 X10E3/UL (ref 0.7–3.1)
LYMPHOCYTES NFR BLD AUTO: 13 %
MCH RBC QN AUTO: 33.9 PG (ref 26.6–33)
MCHC RBC AUTO-ENTMCNC: 34.8 G/DL (ref 31.5–35.7)
MCV RBC AUTO: 97 FL (ref 79–97)
MONOCYTES # BLD AUTO: 0.8 X10E3/UL (ref 0.1–0.9)
MONOCYTES NFR BLD AUTO: 9 %
NEUTROPHILS # BLD AUTO: 6.6 X10E3/UL (ref 1.4–7)
NEUTROPHILS NFR BLD AUTO: 74 %
PLATELET # BLD AUTO: 330 X10E3/UL (ref 150–379)
POTASSIUM SERPL-SCNC: 4 MMOL/L (ref 3.5–5.2)
PROT SERPL-MCNC: 6.9 G/DL (ref 6–8.5)
RBC # BLD AUTO: 4.6 X10E6/UL (ref 4.14–5.8)
SODIUM SERPL-SCNC: 132 MMOL/L (ref 134–144)
TRIGL SERPL-MCNC: 94 MG/DL (ref 0–149)
VLDLC SERPL CALC-MCNC: 19 MG/DL (ref 5–40)
WBC # BLD AUTO: 8.9 X10E3/UL (ref 3.4–10.8)

## 2018-02-08 ENCOUNTER — HOSPITAL ENCOUNTER (OUTPATIENT)
Dept: LAB | Age: 68
Discharge: HOME | End: 2018-02-08
Payer: MEDICARE

## 2018-02-08 ENCOUNTER — OFFICE VISIT (OUTPATIENT)
Dept: FAMILY MEDICINE CLINIC | Age: 68
End: 2018-02-08

## 2018-02-08 VITALS
HEIGHT: 68 IN | BODY MASS INDEX: 22.01 KG/M2 | WEIGHT: 145.2 LBS | DIASTOLIC BLOOD PRESSURE: 94 MMHG | HEART RATE: 86 BPM | OXYGEN SATURATION: 95 % | SYSTOLIC BLOOD PRESSURE: 137 MMHG | RESPIRATION RATE: 17 BRPM | TEMPERATURE: 98 F

## 2018-02-08 DIAGNOSIS — Z11.3 SCREEN FOR STD (SEXUALLY TRANSMITTED DISEASE): Primary | ICD-10-CM

## 2018-02-08 PROCEDURE — 86592 SYPHILIS TEST NON-TREP QUAL: CPT

## 2018-02-08 PROCEDURE — 86803 HEPATITIS C AB TEST: CPT

## 2018-02-08 PROCEDURE — 86696 HERPES SIMPLEX TYPE 2 TEST: CPT

## 2018-02-08 PROCEDURE — 87389 HIV-1 AG W/HIV-1&-2 AB AG IA: CPT

## 2018-02-08 NOTE — PROGRESS NOTES
Chief Complaint   Patient presents with    Labs     Pt seen in the office today for \"im getting blood work\"  No further elaboration given

## 2018-02-08 NOTE — PROGRESS NOTES
Chief Complaint   Patient presents with    Labs     Pt seen in the office today for \"im getting blood work\"  No further elaboration given    Pt is interested in STD testing. Pt declined gonorrhea and chlamydia testing. Pt reports having this done 2 years ago and has not been sexually active since. Pt is having testing done to be with new partner. Subjective: (As above and below)     Chief Complaint   Patient presents with   Bryce Aly     he is a 75y.o. year old male who presents for evaluation. Reviewed PmHx, RxHx, FmHx, SocHx, AllgHx and updated in chart. Review of Systems - negative except as listed above    Objective:     Vitals:    02/08/18 0729   BP: (!) 137/94   Pulse: 86   Resp: 17   Temp: 98 °F (36.7 °C)   TempSrc: Oral   SpO2: 95%   Weight: 145 lb 3.2 oz (65.9 kg)   Height: 5' 8\" (1.727 m)     Physical Examination: General appearance - alert, well appearing, and in no distress  Mental status - normal mood, behavior, speech, dress, motor activity, and thought processes  Musculoskeletal - no joint tenderness, deformity or swelling  Extremities - peripheral pulses normal    Assessment/ Plan:   1. Screen for STD (sexually transmitted disease)  -check labs  - HIV 1/2 AG/AB, 4TH GENERATION,W RFLX CONFIRM  - HERPES SIMPLEX VIRUS TYPES 1/2 SPECIFIC AB, IGG  - RPR  - HEPATITIS C AB  - HEP B SURFACE AB     Follow-up Disposition: As needed  I have discussed the diagnosis with the patient and the intended plan as seen in the above orders. The patient has received an after-visit summary and questions were answered concerning future plans.      Medication Side Effects and Warnings were discussed with patient: yes  Patient Labs were reviewed: yes  Patient Past Records were reviewed:  yes    Katina Ivory M.D.

## 2018-02-08 NOTE — MR AVS SNAPSHOT
[address] [address] 
[phone] Patient: Preethi Crespo MRN: [id number] ITQ:0/01/9146 Visit Information Date & Time Provider Department Dept. Phone Encounter #  
 2/8/2018  7:45 AM Sumanth Whitt MD [address] [facility] [phone] [id number] Upcoming Health Maintenance Date Due  
 GLAUCOMA SCREENING Q2Y 2/1/2019 MEDICARE YEARLY EXAM 2/2/2019 COLONOSCOPY 5/19/2019 DTaP/Tdap/Td series (2 - Td) 3/2/2027 Allergies as of 2/8/2018  Review Complete On: 2/8/2018 By: Sumanth Whitt MD  
 No Known Allergies Current Immunizations  Reviewed on 12/8/2016 Name Date Influenza High Dose Vaccine PF 2/1/2018  8:56 AM  
 Influenza Vaccine 1/2/2017, 10/5/2015 Pneumococcal Conjugate (PCV-13) 1/2/2017 Pneumococcal Polysaccharide (PPSV-23) 10/20/2015 Zoster Vaccine, Live 1/21/2015 Not reviewed this visit You Were Diagnosed With   
  
 Codes Comments Screen for STD (sexually transmitted disease)    -  Primary ICD-10-CM: Z11.3 ICD-9-CM: V74.5 Vitals BP Pulse Temp Resp Height(growth percentile) Weight(growth percentile) (!) 137/94 (BP 1 Location: Left arm, BP Patient Position: Sitting) 86 98 °F (36.7 °C) (Oral) 17 5' 8\" (1.727 m) 145 lb 3.2 oz (65.9 kg) SpO2 BMI Smoking Status 95% 22.08 kg/m2 Former Smoker Vitals History BMI and BSA Data Body Mass Index Body Surface Area 22.08 kg/m 2 1.78 m 2 [facility] [address] [place] [phone] Your Updated Medication List  
  
   
This list is accurate as of: 2/8/18  7:49 AM.  Always use your most recent med list.  
  
  
  
  
 amitriptyline 50 mg tablet Commonly known as:  ELAVIL Take 1 Tab by mouth nightly. fluticasone 50 mcg/actuation nasal spray Commonly known as:  Gerald Law 2 Sprays by Both Nostrils route daily. Administer to right and left nostrils. levocetirizine 5 mg tablet Commonly known as:  Soila Stands Take 1 Tab by mouth daily. LEVOXYL 175 mcg tablet Generic drug:  levothyroxine Take 175 mcg by mouth Daily (before breakfast). losartan-hydroCHLOROthiazide 50-12.5 mg per tablet Commonly known as:  HYZAAR Take 1 Tab by mouth daily. We Performed the Following HEP B SURFACE AB A049321 CPT(R)] HEPATITIS C AB [29428 CPT(R)] HERPES SIMPLEX VIRUS TYPES 1/2 SPECIFIC AB, IGG [[id number] Custom] HIV 1/2 AG/AB, 4TH GENERATION,W RFLX CONFIRM [id number] CPT(R)] RPR [35108 CPT(R)] Introducing [facility] & [facility]! Dear Christopher Cabrera: Thank you for requesting a [employer] account. Our records indicate that you already have an active [employer] account. You can access your account anytime at [email or web address] Did you know that you can access your hospital and ER discharge instructions at any time in [employer]? You can also review all of your test results from your hospital stay or ER visit. Additional Information If you have questions, please visit the Frequently Asked Questions section of the [employer] website at [email or web address] Remember, [employer] is NOT to be used for urgent needs. For medical emergencies, dial 911. Now available from your iPhone and Android! Please provide this summary of care documentation to your next provider. Your primary care clinician is listed as Hector Ocasio. If you have any questions after today's visit, please call [phone].

## 2018-02-09 ENCOUNTER — TELEPHONE (OUTPATIENT)
Dept: FAMILY MEDICINE CLINIC | Age: 68
End: 2018-02-09

## 2018-02-09 LAB
HCV AB S/CO SERPL IA: 0.1 S/CO RATIO (ref 0–0.9)
HIV 1+2 AB+HIV1 P24 AG SERPL QL IA: NON REACTIVE
HSV1 IGG SER IA-ACNC: 31.7 INDEX (ref 0–0.9)
HSV2 IGG SER IA-ACNC: 1.55 INDEX (ref 0–0.9)
RPR SER QL: NON REACTIVE

## 2018-02-09 NOTE — TELEPHONE ENCOUNTER
Pt calling and states saw his lab results on my chart and now he wants a nurse to explain the results to him. Explained they are off today and would send message for them for Monday. Please call him back at [phone].

## 2018-02-09 NOTE — PROGRESS NOTES
History of exposure to type 1 and type 2 herpes. All other labs are within normal limits. A message has been sent in [employer] and the lab work released to the patient.

## 2018-02-12 NOTE — TELEPHONE ENCOUNTER
Called pt regarding recent lab work. Explained results to pt in great detail, pt states he sent Srini Main a [employer] message asking for her to call him. Pt is requesting again to have  call him. He states he has several questions.

## 2018-03-08 RX ORDER — LEVOCETIRIZINE DIHYDROCHLORIDE 5 MG/1
TABLET, FILM COATED ORAL
Qty: 30 TAB | Refills: 11 | Status: SHIPPED | OUTPATIENT
Start: 2018-03-08 | End: 2019-12-04 | Stop reason: ALTCHOICE

## 2018-05-29 ENCOUNTER — OFFICE VISIT (OUTPATIENT)
Dept: FAMILY MEDICINE CLINIC | Age: 68
End: 2018-05-29

## 2018-05-29 VITALS
WEIGHT: 140 LBS | TEMPERATURE: 98.1 F | DIASTOLIC BLOOD PRESSURE: 99 MMHG | RESPIRATION RATE: 17 BRPM | HEIGHT: 68 IN | HEART RATE: 82 BPM | OXYGEN SATURATION: 98 % | BODY MASS INDEX: 21.22 KG/M2 | SYSTOLIC BLOOD PRESSURE: 152 MMHG

## 2018-05-29 DIAGNOSIS — F41.8 DEPRESSION WITH ANXIETY: Primary | ICD-10-CM

## 2018-05-29 RX ORDER — CITALOPRAM 40 MG/1
40 TABLET, FILM COATED ORAL DAILY
Qty: 30 TAB | Refills: 5 | Status: SHIPPED | OUTPATIENT
Start: 2018-05-29 | End: 2018-11-19 | Stop reason: SDUPTHER

## 2018-05-29 NOTE — MR AVS SNAPSHOT
[address] [address] 
[phone] Patient: Austin Farley MRN: [id number] WAO:0/13/2752 Visit Information Date & Time Provider Department Dept. Phone Encounter #  
 5/29/2018 11:20 AM Darryle Mixer, MD [address] [facility] [phone] [id number] Upcoming Health Maintenance Date Due Influenza Age 5 to Adult 8/1/2018 GLAUCOMA SCREENING Q2Y 2/1/2019 MEDICARE YEARLY EXAM 2/2/2019 COLONOSCOPY 5/19/2019 DTaP/Tdap/Td series (2 - Td) 3/2/2027 Allergies as of 5/29/2018  Review Complete On: 5/29/2018 By: Darryle Mixer, MD  
 No Known Allergies Current Immunizations  Reviewed on 12/8/2016 Name Date Influenza High Dose Vaccine PF 2/1/2018  8:56 AM  
 Influenza Vaccine 1/2/2017, 10/5/2015 Pneumococcal Conjugate (PCV-13) 1/2/2017 Pneumococcal Polysaccharide (PPSV-23) 10/20/2015 Zoster Vaccine, Live 1/21/2015 Not reviewed this visit You Were Diagnosed With   
  
 Codes Comments Depression with anxiety    -  Primary ICD-10-CM: F41.8 ICD-9-CM: 300.4 Vitals BP Pulse Temp Resp Height(growth percentile) Weight(growth percentile) (!) 152/99 82 98.1 °F (36.7 °C) 17 5' 8\" (1.727 m) 140 lb (63.5 kg) SpO2 BMI Smoking Status 98% 21.29 kg/m2 Former Smoker Vitals History BMI and BSA Data Body Mass Index Body Surface Area  
 21.29 kg/m 2 1.75 m 2 [facility] [address] [facility] [place] [phone] Your Updated Medication List  
  
   
This list is accurate as of 5/29/18 12:05 PM.  Always use your most recent med list.  
  
  
  
  
 amitriptyline 50 mg tablet Commonly known as:  ELAVIL Take 1 Tab by mouth nightly. citalopram 40 mg tablet Commonly known as:  Vance Copa Take 1 Tab by mouth daily. fluticasone 50 mcg/actuation nasal spray Commonly known as:  Brenna Good Thunder 2 Sprays by Both Nostrils route daily. Administer to right and left nostrils. levocetirizine 5 mg tablet Commonly known as:  Lorelei Abdullahi TAKE ONE TABLET BY MOUTH ONCE DAILY LEVOXYL 175 mcg tablet Generic drug:  levothyroxine Take 175 mcg by mouth Daily (before breakfast). losartan-hydroCHLOROthiazide 50-12.5 mg per tablet Commonly known as:  HYZAAR Take 1 Tab by mouth daily. Prescriptions Printed Refills  
 citalopram (CELEXA) 40 mg tablet 5 Sig: Take 1 Tab by mouth daily. Class: Print Route: Oral  
  
Introducing hospitals & [facility] SERVICES! Dear Thiago Martinez: Thank you for requesting a [employer] account. Our records indicate that you already have an active [employer] account. You can access your account anytime at [email or web address] Did you know that you can access your hospital and ER discharge instructions at any time in [employer]? You can also review all of your test results from your hospital stay or ER visit. Additional Information If you have questions, please visit the Frequently Asked Questions section of the [employer] website at [email or web address] Remember, [employer] is NOT to be used for urgent needs. For medical emergencies, dial 911. Now available from your iPhone and Android! Please provide this summary of care documentation to your next provider. Your primary care clinician is listed as Hector Ocasio. If you have any questions after today's visit, please call [phone].

## 2018-05-29 NOTE — PROGRESS NOTES
Chief Complaint   Patient presents with    Medication Evaluation     discuss anxiety meds. states that alprazolam causes severe HA's and drowsiness. Pt states that he has been feeling suicidal. States that yesterday he thought about taking the last 18 alprazolam.   Also states that he broke out in a cold sweat yesterday, and his BP has been elevated for the past 3 months. Pt is in counseling. Received a call from his counselor last week who recommend medication to help with panic attacks, we tried Xanax, which had negative side effects. Pt is in a relationship with a woman who is also seeing another man, very stressful situation for pt. Pt reports that his girlfriend's daughter filed a restraining order against him, he was concerned that he was going to [facility] for over a month. Pt reports that in the past he was on Prozac a few years ago, did well other than nausea at the beginning and difficulty weaning at the end. Pt was on Wellbutrin to help quit smoking    Pt reports that he has low tolerance and short temper. Subjective: (As above and below)     Chief Complaint   Patient presents with    Medication Evaluation     discuss anxiety meds. states that alprazolam causes severe HA's and drowsiness. he is a 75y.o. year old male who presents for evaluation. Reviewed PmHx, RxHx, FmHx, SocHx, AllgHx and updated in chart.     Review of Systems - negative except as listed above    Objective:     Vitals:    05/29/18 1123   BP: (!) 152/99   Pulse: 82   Resp: 17   Temp: 98.1 °F (36.7 °C)   SpO2: 98%   Weight: 140 lb (63.5 kg)   Height: 5' 8\" (1.727 m)     Physical Examination: General appearance - alert, well appearing, and in no distress  Mental status - depressed mood  Mouth - mucous membranes moist, pharynx normal without lesions  Chest - clear to auscultation, no wheezes, rales or rhonchi, symmetric air entry  Heart - normal rate, regular rhythm, normal S1, S2, no murmurs, rubs, clicks or pura    Assessment/ Plan:   1. Depression with anxiety  I've explained to him that drugs of the SSRI class can have side effects such as weight gain, sexual dysfunction, insomnia, headache, nausea. These medications are generally effective at alleviating symptoms of anxiety and/or depression. Let me know if significant side effects do occur.  - citalopram (CELEXA) 40 mg tablet; Take 1 Tab by mouth daily. Dispense: 30 Tab; Refill: 5    -pt contracted for safety, agreed to dispose of klonopin upon arrival at home, has a safety plan and two numbers to call     Follow-up Disposition: 2 weeks  I have discussed the diagnosis with the patient and the intended plan as seen in the above orders. The patient has received an after-visit summary and questions were answered concerning future plans.      Medication Side Effects and Warnings were discussed with patient: yes  Patient Labs were reviewed: yes  Patient Past Records were reviewed:  yes    Martha Coronado M.D.

## 2018-06-14 ENCOUNTER — OFFICE VISIT (OUTPATIENT)
Dept: FAMILY MEDICINE CLINIC | Age: 68
End: 2018-06-14

## 2018-06-14 VITALS
BODY MASS INDEX: 21.22 KG/M2 | SYSTOLIC BLOOD PRESSURE: 140 MMHG | DIASTOLIC BLOOD PRESSURE: 86 MMHG | RESPIRATION RATE: 18 BRPM | WEIGHT: 140 LBS | OXYGEN SATURATION: 97 % | TEMPERATURE: 97.9 F | HEIGHT: 68 IN | HEART RATE: 81 BPM

## 2018-06-14 DIAGNOSIS — F41.8 DEPRESSION WITH ANXIETY: Primary | ICD-10-CM

## 2018-08-13 ENCOUNTER — OFFICE VISIT (OUTPATIENT)
Dept: FAMILY MEDICINE CLINIC | Age: 68
End: 2018-08-13

## 2018-08-13 VITALS
SYSTOLIC BLOOD PRESSURE: 140 MMHG | RESPIRATION RATE: 18 BRPM | TEMPERATURE: 98.3 F | DIASTOLIC BLOOD PRESSURE: 83 MMHG | BODY MASS INDEX: 21.22 KG/M2 | OXYGEN SATURATION: 98 % | HEART RATE: 78 BPM | HEIGHT: 68 IN | WEIGHT: 140 LBS

## 2018-08-13 DIAGNOSIS — I10 ESSENTIAL HYPERTENSION: ICD-10-CM

## 2018-08-13 DIAGNOSIS — F41.8 DEPRESSION WITH ANXIETY: Primary | ICD-10-CM

## 2018-08-13 DIAGNOSIS — K59.00 CONSTIPATION, UNSPECIFIED CONSTIPATION TYPE: ICD-10-CM

## 2018-08-13 RX ORDER — LOSARTAN POTASSIUM AND HYDROCHLOROTHIAZIDE 12.5; 1 MG/1; MG/1
1 TABLET ORAL DAILY
Qty: 90 TAB | Refills: 1 | Status: SHIPPED | OUTPATIENT
Start: 2018-08-13 | End: 2019-02-19 | Stop reason: SDUPTHER

## 2018-08-13 NOTE — PROGRESS NOTES
Pt here for medication evaluation on Celexa. Reports he is unsure of medication has helped or not. Also c/o ongoing constipation since having colonoscopy in June. Pt has had an ongoing tumultuous relationship, possibly ended last week. Pt reports that he has been feeling sad about the break up, but feels better about it than he has previously. Pt reports that he gets upset but is often able to control his feelings. Pt also reports a fall a few months onto his right face, has been having headaches around his right eye since then. Pt reports that headaches come and go, will occur several times a day. Colonoscopy on June 12, pt has been constipated since then. Pt has needed to take laxatives for relief. Pt reports he will not go for up to a week. Pt also reports elevated BP. Subjective: (As above and below)     Chief Complaint   Patient presents with    Depression     medication evaluation     he is a 75y.o. year old male who presents for evaluation. Reviewed PmHx, RxHx, FmHx, SocHx, AllgHx and updated in chart. Review of Systems - negative except as listed above    Objective:     Vitals:    08/13/18 1503   BP: 140/83   Pulse: 78   Resp: 18   Temp: 98.3 °F (36.8 °C)   TempSrc: Oral   SpO2: 98%   Weight: 140 lb (63.5 kg)   Height: 5' 8\" (1.727 m)     Physical Examination: General appearance - alert, well appearing, and in no distress  Mental status - normal mood, behavior, speech, dress, motor activity, and thought processes  Chest - clear to auscultation, no wheezes, rales or rhonchi, symmetric air entry  Heart - normal rate, regular rhythm, normal S1, S2, no murmurs, rubs, clicks or gallops  Extremities - peripheral pulses normal, no pedal edema, no clubbing or cyanosis    Assessment/ Plan:   1. Depression with anxiety  -continue on Celexa as written    2. Constipation, unspecified constipation type  -start on probiotic    3.  Essential hypertension  -increase losartan dose  - losartan-hydroCHLOROthiazide (HYZAAR) 100-12.5 mg per tablet; Take 1 Tab by mouth daily. Dispense: 90 Tab; Refill: 1     Follow-up Disposition: As needed  I have discussed the diagnosis with the patient and the intended plan as seen in the above orders. The patient has received an after-visit summary and questions were answered concerning future plans.      Medication Side Effects and Warnings were discussed with patient: yes  Patient Labs were reviewed: yes  Patient Past Records were reviewed:  yes    Pillo Caruso M.D.

## 2018-08-13 NOTE — MR AVS SNAPSHOT
[address] [address] 
[phone] Patient: Lexie Estrella MRN: [id number] Visit Information Date & Time Provider Department Dept. Phone Encounter #  
 8/13/2018  3:25 PM Christiano Pacheco MD [facility] [phone] [id number] Upcoming Health Maintenance Date Due Influenza Age 5 to Adult 8/1/2018 GLAUCOMA SCREENING Q2Y 2/1/2019 MEDICARE YEARLY EXAM 2/2/2019 COLONOSCOPY 6/12/2023 DTaP/Tdap/Td series (2 - Td) 3/2/2027 Allergies as of 8/13/2018  Review Complete On: 8/13/2018 By: Christiano Pacheco MD  
 No Known Allergies Current Immunizations  Reviewed on 12/8/2016 Name Date Influenza High Dose Vaccine PF 2/1/2018  8:56 AM  
 Influenza Vaccine 1/2/2017, 10/5/2015 Pneumococcal Conjugate (PCV-13) 1/2/2017 Pneumococcal Polysaccharide (PPSV-23) 10/20/2015 Zoster Vaccine, Live 1/21/2015 Not reviewed this visit You Were Diagnosed With   
  
 Codes Comments Depression with anxiety    -  Primary ICD-10-CM: F41.8 ICD-9-CM: 300.4 Constipation, unspecified constipation type     ICD-10-CM: K59.00 ICD-9-CM: 564.00 Essential hypertension     ICD-10-CM: I10 
ICD-9-CM: 401.9 Vitals BP Pulse Temp Resp Height(growth percentile) Weight(growth percentile) 140/83 (BP 1 Location: Left arm, BP Patient Position: Sitting) 78 98.3 °F (36.8 °C) (Oral) 18 5' 8\" (1.727 m) 140 lb (63.5 kg) SpO2 BMI Smoking Status 98% 21.29 kg/m2 Former Smoker Vitals History BMI and BSA Data Body Mass Index Body Surface Area  
 21.29 kg/m 2 1.75 m 2 Preferred Pharmacy Pharmacy Name Phone [address] [phone] Your Updated Medication List  
  
   
This list is accurate as of 8/13/18  3:30 PM.  Always use your most recent med list.  
  
  
  
  
 amitriptyline 50 mg tablet Commonly known as:  ELAVIL Take 1 Tab by mouth nightly. citalopram 40 mg tablet Commonly known as:  Daly Peel Take 1 Tab by mouth daily. fluticasone 50 mcg/actuation nasal spray Commonly known as:  Latasha Reges 2 Sprays by Both Nostrils route daily. Administer to right and left nostrils. levocetirizine 5 mg tablet Commonly known as:  Chelo Ancelmo TAKE ONE TABLET BY MOUTH ONCE DAILY LEVOXYL 175 mcg tablet Generic drug:  levothyroxine Take 175 mcg by mouth Daily (before breakfast). losartan-hydroCHLOROthiazide 100-12.5 mg per tablet Commonly known as:  HYZAAR Take 1 Tab by mouth daily. Prescriptions Printed Refills  
 losartan-hydroCHLOROthiazide (HYZAAR) 100-12.5 mg per tablet 1 Sig: Take 1 Tab by mouth daily. Class: Print Route: Oral  
  
Introducing [facility]! Dear Devon Mejia: Thank you for requesting a [employer] account. Our records indicate that you already have an active [employer] account. You can access your account anytime at [email or web address] Did you know that you can access your hospital and ER discharge instructions at any time in [employer]? You can also review all of your test results from your hospital stay or ER visit. Additional Information If you have questions, please visit the Frequently Asked Questions section of the [employer] website at [email or web address] Remember, [employer] is NOT to be used for urgent needs. For medical emergencies, dial 911. Now available from your iPhone and Android! Please provide this summary of care documentation to your next provider. Your primary care clinician is listed as Hector Ocasio. If you have any questions after today's visit, please call [phone].

## 2018-11-19 DIAGNOSIS — F41.8 DEPRESSION WITH ANXIETY: ICD-10-CM

## 2018-11-19 RX ORDER — CITALOPRAM 40 MG/1
40 TABLET, FILM COATED ORAL DAILY
Qty: 30 TAB | Refills: 5 | Status: SHIPPED | OUTPATIENT
Start: 2018-11-19 | End: 2019-05-19 | Stop reason: SDUPTHER

## 2018-11-27 ENCOUNTER — OFFICE VISIT (OUTPATIENT)
Dept: FAMILY MEDICINE CLINIC | Age: 68
End: 2018-11-27

## 2018-11-27 VITALS
OXYGEN SATURATION: 94 % | HEIGHT: 68 IN | HEART RATE: 75 BPM | SYSTOLIC BLOOD PRESSURE: 144 MMHG | DIASTOLIC BLOOD PRESSURE: 77 MMHG | TEMPERATURE: 97.7 F | BODY MASS INDEX: 22.91 KG/M2 | WEIGHT: 151.2 LBS | RESPIRATION RATE: 16 BRPM

## 2018-11-27 DIAGNOSIS — J01.00 ACUTE NON-RECURRENT MAXILLARY SINUSITIS: Primary | ICD-10-CM

## 2018-11-27 RX ORDER — AMOXICILLIN AND CLAVULANATE POTASSIUM 875; 125 MG/1; MG/1
1 TABLET, FILM COATED ORAL 2 TIMES DAILY
Qty: 20 TAB | Refills: 0 | Status: SHIPPED | OUTPATIENT
Start: 2018-11-27 | End: 2018-12-07

## 2018-11-27 NOTE — PROGRESS NOTES
Chief Complaint   Patient presents with    Cold Symptoms     Started on Thanksgiving day     Pt reports that since symptoms started 6 days ago then have become progressively worse. Pt has been taking OTC cold medication with minimal relief. No fever per pt report. Pt reports that he has been having ongoing issues with his ex girlfriend, was arrested for stalking the same day a protective order was issued. Pt is dealing with the legal issues. Subjective:   Ana Treadwell is a 70 y.o. male who complains of congestion and generalized sinus pain for 6 days, gradually worsening since that time. He denies a history of shortness of breath and wheezing. Evaluation to date: none. Treatment to date: OTC products. Patient does not smoke cigarettes. Relevant PMH: No pertinent additional PMH. Patient Active Problem List   Diagnosis Code    Thyroid disease E07.9    Hypertension I10     Current Outpatient Medications   Medication Sig Dispense Refill    amoxicillin-clavulanate (AUGMENTIN) 875-125 mg per tablet Take 1 Tab by mouth two (2) times a day for 10 days. 20 Tab 0    citalopram (CELEXA) 40 mg tablet Take 1 Tab by mouth daily. 30 Tab 5    losartan-hydroCHLOROthiazide (HYZAAR) 100-12.5 mg per tablet Take 1 Tab by mouth daily. 90 Tab 1    levocetirizine (XYZAL) 5 mg tablet TAKE ONE TABLET BY MOUTH ONCE DAILY 30 Tab 11    amitriptyline (ELAVIL) 50 mg tablet Take 1 Tab by mouth nightly. 90 Tab 3    levothyroxine (LEVOXYL) 175 mcg tablet Take 175 mcg by mouth Daily (before breakfast).  fluticasone (FLONASE) 50 mcg/actuation nasal spray 2 Sprays by Both Nostrils route daily. Administer to right and left nostrils. 1 Bottle 5     No Known Allergies     Review of Systems  Pertinent items are noted in HPI.     Objective:     Visit Vitals  /77 (BP 1 Location: Left arm, BP Patient Position: Sitting)   Pulse 75   Temp 97.7 °F (36.5 °C) (Oral)   Resp 16   Ht 5' 8\" (1.727 m)   Wt 151 lb 3.2 oz (68.6 kg)   SpO2 94%   BMI 22.99 kg/m²     General:  alert, cooperative, no distress   Eyes: conjunctivae/corneas clear. PERRL, EOM's intact. Fundi benign   Ears: normal TM's and external ear canals AU   Sinuses: tenderness over generalized maxillary   Mouth:  Lips, mucosa, and tongue normal. Teeth and gums normal   Neck: supple, symmetrical, trachea midline and no adenopathy. Heart: S1 and S2 normal, no murmurs noted. Lungs: clear to auscultation bilaterally        Assessment/Plan:   sinusitis  Suggested symptomatic OTC remedies. Antibiotics per orders. RTC prn. ICD-10-CM ICD-9-CM    1. Acute non-recurrent maxillary sinusitis J01.00 461.0 amoxicillin-clavulanate (AUGMENTIN) 875-125 mg per tablet     Encounter Diagnoses   Name Primary?  Acute non-recurrent maxillary sinusitis Yes     Orders Placed This Encounter    amoxicillin-clavulanate (AUGMENTIN) 875-125 mg per tablet   .

## 2018-11-27 NOTE — PROGRESS NOTES
Shelle Babinski is a 70 y.o. male    Chief Complaint   Patient presents with    Cold Symptoms     Started on Thanksgiving day     1. Have you been to the ER, urgent care clinic since your last visit? Hospitalized since your last visit? No    2. Have you seen or consulted any other health care providers outside of the [facility] since your last visit? Include any pap smears or colon screening. No    Constipated and abd discomfort and pain.

## 2018-12-12 ENCOUNTER — OFFICE VISIT (OUTPATIENT)
Dept: FAMILY MEDICINE CLINIC | Age: 68
End: 2018-12-12

## 2018-12-12 VITALS
HEART RATE: 86 BPM | BODY MASS INDEX: 22.73 KG/M2 | RESPIRATION RATE: 20 BRPM | DIASTOLIC BLOOD PRESSURE: 88 MMHG | SYSTOLIC BLOOD PRESSURE: 136 MMHG | HEIGHT: 68 IN | WEIGHT: 150 LBS | TEMPERATURE: 97.7 F | OXYGEN SATURATION: 95 %

## 2018-12-12 DIAGNOSIS — K59.00 CONSTIPATION, UNSPECIFIED CONSTIPATION TYPE: Primary | ICD-10-CM

## 2018-12-12 RX ORDER — MAGNESIUM CITRATE
296 SOLUTION, ORAL ORAL
Qty: 1 BOTTLE | Refills: 0 | Status: SHIPPED | OUTPATIENT
Start: 2018-12-12 | End: 2018-12-12

## 2018-12-12 NOTE — PROGRESS NOTES
Chief Complaint   Patient presents with    Abdominal Cramping    Constipation     x 2 weeks     Pt reports that he has had increased stomach gurgling today. Pt reports that he has been passing brown water, this morning passed two soft brown nuggets. Pt reports that he is under a lot of stress due to an upcoming court date and his heat pump having problems. Pt has taken generic forms of some OTC laxatives and stool softeners. Subjective: (As above and below)     Chief Complaint   Patient presents with    Abdominal Cramping    Constipation     x 2 weeks     he is a 70y.o. year old male who presents for evaluation. Reviewed PmHx, RxHx, FmHx, SocHx, AllgHx and updated in chart. Review of Systems - negative except as listed above    Objective:     Vitals:    12/12/18 1321   BP: 136/88   Pulse: 86   Resp: 20   Temp: 97.7 °F (36.5 °C)   TempSrc: Oral   SpO2: 95%   Weight: 150 lb (68 kg)   Height: 5' 8\" (1.727 m)     Physical Examination: General appearance - alert, well appearing, and in no distress  Mental status - normal mood, behavior, speech, dress, motor activity, and thought processes  Mouth - mucous membranes moist, pharynx normal without lesions  Chest - clear to auscultation, no wheezes, rales or rhonchi, symmetric air entry  Heart - normal rate, regular rhythm, normal S1, S2, no murmurs, rubs, clicks or gallops  Abdomen - soft, nontender, nondistended, no masses or organomegaly  Musculoskeletal - no joint tenderness, deformity or swelling    Assessment/ Plan:   1. Constipation, unspecified constipation type  -advised pt to try magnesium citrate, if no better than get an x-ray to assess degree of blockage vs. symptoms  - magnesium citrate solution; Take 296 mL by mouth now for 1 dose. Dispense: 1 Bottle; Refill: 0     Follow-up Disposition: As needed  I have discussed the diagnosis with the patient and the intended plan as seen in the above orders.   The patient has received an after-visit summary and questions were answered concerning future plans.      Medication Side Effects and Warnings were discussed with patient: yes  Patient Labs were reviewed: yes  Patient Past Records were reviewed:  yes    Tonny Mcgee M.D.

## 2018-12-12 NOTE — PROGRESS NOTES
Chief Complaint   Patient presents with    Abdominal Cramping    Constipation     x 2 weeks     1. Have you been to the ER, urgent care clinic since your last visit? Hospitalized since your last visit? No    2. Have you seen or consulted any other health care providers outside of the [address] [place] [address] since your last visit? Include any pap smears or colon screening.  No    Visit Vitals  /88 (BP 1 Location: Left arm, BP Patient Position: Sitting)   Pulse 86   Temp 97.7 °F (36.5 °C) (Oral)   Resp 20   Ht 5' 8\" (1.727 m)   Wt 150 lb (68 kg)   SpO2 95%   BMI 22.81 kg/m²

## 2018-12-19 ENCOUNTER — OFFICE VISIT (OUTPATIENT)
Dept: FAMILY MEDICINE CLINIC | Age: 68
End: 2018-12-19

## 2018-12-19 VITALS
BODY MASS INDEX: 22.73 KG/M2 | OXYGEN SATURATION: 95 % | RESPIRATION RATE: 15 BRPM | SYSTOLIC BLOOD PRESSURE: 126 MMHG | HEIGHT: 68 IN | TEMPERATURE: 98.5 F | HEART RATE: 76 BPM | DIASTOLIC BLOOD PRESSURE: 86 MMHG | WEIGHT: 150 LBS

## 2018-12-19 DIAGNOSIS — R10.9 ABDOMINAL CRAMPING: Primary | ICD-10-CM

## 2018-12-19 RX ORDER — DICYCLOMINE HYDROCHLORIDE 10 MG/1
10 CAPSULE ORAL 3 TIMES DAILY
Qty: 90 CAP | Refills: 3 | Status: SHIPPED | OUTPATIENT
Start: 2018-12-19 | End: 2019-02-19

## 2018-12-19 NOTE — PROGRESS NOTES
Chief Complaint   Patient presents with    Abdominal Pain     Unresolved      Pt seen in the office today for unresolved diarrhea and abdominal discomfort   Pt reports taking numerous OTC's for stomach cramping and constipation, \"I cant remember the last time I had a formed stool\". Pt reports that he has been taking stool softeners, antacids, laxatives, milk of magnesia. Pt reports that if does not take all of the above medication then he has unbearable abdominal cramps. Pt reports that his stool is brown liquid only. Feels as though he is constipated. Subjective: (As above and below)     Chief Complaint   Patient presents with    Abdominal Pain     Unresolved      he is a 70y.o. year old male who presents for evaluation. Reviewed PmHx, RxHx, FmHx, SocHx, AllgHx and updated in chart. Review of Systems - negative except as listed above    Objective:     Vitals:    12/19/18 1322   BP: 126/86   Pulse: 76   Resp: 15   Temp: 98.5 °F (36.9 °C)   TempSrc: Oral   SpO2: 95%   Weight: 150 lb (68 kg)   Height: 5' 8\" (1.727 m)     Physical Examination: General appearance - alert, well appearing, and in no distress  Mental status - normal mood, behavior, speech, dress, motor activity, and thought processes  Mouth - mucous membranes moist, pharynx normal without lesions  Chest - clear to auscultation, no wheezes, rales or rhonchi, symmetric air entry  Heart - normal rate, regular rhythm, normal S1, S2, no murmurs, rubs, clicks or gallops  Abdomen - tenderness noted diffuse upper abdominal tenderness  Musculoskeletal - no joint tenderness, deformity or swelling    Assessment/ Plan:   1. Abdominal cramping  -check x-ray, trial of medication to help with cramping  - XR ABD FLAT/ ERECT; Future  - dicyclomine (BENTYL) 10 mg capsule; Take 1 Cap by mouth three (3) times daily. Dispense: 90 Cap;  Refill: 3     Follow-up Disposition: As needed  I have discussed the diagnosis with the patient and the intended plan as seen in the above orders. The patient has received an after-visit summary and questions were answered concerning future plans.      Medication Side Effects and Warnings were discussed with patient: yes  Patient Labs were reviewed: yes  Patient Past Records were reviewed:  yes    Nuris Zazueta M.D.

## 2018-12-26 ENCOUNTER — TELEPHONE (OUTPATIENT)
Dept: FAMILY MEDICINE CLINIC | Age: 68
End: 2018-12-26

## 2018-12-26 NOTE — TELEPHONE ENCOUNTER
----- Message from Staceyjonny Merlos sent at 12/26/2018  8:40 AM EST -----  Regarding: Dr. Ocasio/ Telephone  Pt would like to know if xray results have been received from [address] [place] [address].  Phone: [phone]

## 2018-12-26 NOTE — TELEPHONE ENCOUNTER
Please call [facility] and ask to have results faxed. Have not yet been received.

## 2018-12-27 ENCOUNTER — OFFICE VISIT (OUTPATIENT)
Dept: FAMILY MEDICINE CLINIC | Age: 68
End: 2018-12-27

## 2018-12-27 ENCOUNTER — HOSPITAL ENCOUNTER (OUTPATIENT)
Dept: LAB | Age: 68
Discharge: HOME | End: 2018-12-27
Payer: MEDICARE

## 2018-12-27 VITALS
WEIGHT: 149 LBS | HEIGHT: 68 IN | TEMPERATURE: 97.5 F | BODY MASS INDEX: 22.58 KG/M2 | DIASTOLIC BLOOD PRESSURE: 79 MMHG | SYSTOLIC BLOOD PRESSURE: 122 MMHG | HEART RATE: 86 BPM | RESPIRATION RATE: 16 BRPM | OXYGEN SATURATION: 97 %

## 2018-12-27 DIAGNOSIS — R19.7 DIARRHEA, UNSPECIFIED TYPE: ICD-10-CM

## 2018-12-27 DIAGNOSIS — R10.9 ABDOMINAL CRAMPING: Primary | ICD-10-CM

## 2018-12-27 PROCEDURE — 82150 ASSAY OF AMYLASE: CPT

## 2018-12-27 PROCEDURE — 80053 COMPREHEN METABOLIC PANEL: CPT

## 2018-12-27 PROCEDURE — 85025 COMPLETE CBC W/AUTO DIFF WBC: CPT

## 2018-12-27 PROCEDURE — 83690 ASSAY OF LIPASE: CPT

## 2018-12-27 RX ORDER — CIPROFLOXACIN 500 MG/1
500 TABLET ORAL 2 TIMES DAILY
Qty: 20 TAB | Refills: 0 | Status: SHIPPED | OUTPATIENT
Start: 2018-12-27 | End: 2019-01-06

## 2018-12-27 NOTE — PROGRESS NOTES
Chief Complaint   Patient presents with    Nasal Congestion     Patient presents in office today with c/o nasal congestion, ear congestion and head congestion that is not improving since his last visit. Would like to review results from Xray done at [facility]. States that he is still having severe stomach cramps. Has been having dizzy spells. No other concerns. 1. Have you been to the ER, urgent care clinic since your last visit? Hospitalized since your last visit? No    2. Have you seen or consulted any other health care providers outside of the [address] [place] [address] since your last visit? Include any pap smears or colon screening.  Yes When: 12/21/18 [facility] for x-ray     Learning Assessment 2/1/2018   PRIMARY LEARNER Patient   HIGHEST LEVEL OF EDUCATION - PRIMARY LEARNER  GRADUATED HIGH SCHOOL OR GED   BARRIERS PRIMARY LEARNER NONE   CO-LEARNER CAREGIVER No   PRIMARY LANGUAGE ENGLISH   LEARNER PREFERENCE PRIMARY DEMONSTRATION   ANSWERED BY patient   RELATIONSHIP SELF

## 2018-12-27 NOTE — PROGRESS NOTES
Chief Complaint   Patient presents with    Nasal Congestion    Dizziness     Patient presents in office today with c/o nasal congestion, ear congestion and head congestion that is not improving since his last visit. Would like to review results from Xray done at [facility]. States that he is still having severe stomach cramps. Has been having dizzy spells. No other concerns. Pt reports that medication does not help symptoms, pt reports that white bread does help, but symptoms still come back. Pt reports that diarrhea and cramping are ongoing. Subjective: (As above and below)     Chief Complaint   Patient presents with    Nasal Congestion    Dizziness     he is a 70y.o. year old male who presents for evaluation. Reviewed PmHx, RxHx, FmHx, SocHx, AllgHx and updated in chart. Review of Systems - negative except as listed above    Objective:     Vitals:    12/27/18 1344   BP: 122/79   Pulse: 86   Resp: 16   Temp: 97.5 °F (36.4 °C)   TempSrc: Oral   SpO2: 97%   Weight: 169 lb (76.7 kg)   Height: 5' 8\" (1.727 m)     Physical Examination: General appearance - alert, well appearing, and in no distress  Mental status - normal mood, behavior, speech, dress, motor activity, and thought processes  Mouth - mucous membranes moist, pharynx normal without lesions  Chest - clear to auscultation, no wheezes, rales or rhonchi, symmetric air entry  Heart - normal rate, regular rhythm, normal S1, S2, no murmurs, rubs, clicks or gallops  Abdomen - upper abdominal tenderness  Musculoskeletal - no joint tenderness, deformity or swelling  Extremities - peripheral pulses normal, no pedal edema, no clubbing or cyanosis    Assessment/ Plan:   1. Abdominal cramping  -check labs, consider Gi eval if normal  -reviewed x-ray, no constipation noted  - METABOLIC PANEL, COMPREHENSIVE  - CBC WITH AUTOMATED DIFF  - AMYLASE  - LIPASE    2.  Diarrhea, unspecified type  -trial of cipro  -advised pt to take a bland diet     Follow-up Disposition: As needed  I have discussed the diagnosis with the patient and the intended plan as seen in the above orders. The patient has received an after-visit summary and questions were answered concerning future plans.      Medication Side Effects and Warnings were discussed with patient: yes  Patient Labs were reviewed: yes  Patient Past Records were reviewed:  yes    Carito Qiu M.D.

## 2018-12-28 LAB
ALBUMIN SERPL-MCNC: 4.4 G/DL (ref 3.6–4.8)
ALBUMIN/GLOB SERPL: 1.8 {RATIO} (ref 1.2–2.2)
ALP SERPL-CCNC: 82 IU/L (ref 39–117)
ALT SERPL-CCNC: 11 IU/L (ref 0–44)
AMYLASE SERPL-CCNC: 105 U/L (ref 31–124)
AST SERPL-CCNC: 17 IU/L (ref 0–40)
BASOPHILS # BLD AUTO: 0 X10E3/UL (ref 0–0.2)
BASOPHILS NFR BLD AUTO: 0 %
BILIRUB SERPL-MCNC: <0.2 MG/DL (ref 0–1.2)
BUN SERPL-MCNC: 12 MG/DL (ref 8–27)
BUN/CREAT SERPL: 10 (ref 10–24)
CALCIUM SERPL-MCNC: 9.3 MG/DL (ref 8.6–10.2)
CHLORIDE SERPL-SCNC: 97 MMOL/L (ref 96–106)
CO2 SERPL-SCNC: 25 MMOL/L (ref 20–29)
CREAT SERPL-MCNC: 1.22 MG/DL (ref 0.76–1.27)
EOSINOPHIL # BLD AUTO: 0.2 X10E3/UL (ref 0–0.4)
EOSINOPHIL NFR BLD AUTO: 2 %
ERYTHROCYTE [DISTWIDTH] IN BLOOD BY AUTOMATED COUNT: 15.6 % (ref 12.3–15.4)
GLOBULIN SER CALC-MCNC: 2.4 G/DL (ref 1.5–4.5)
GLUCOSE SERPL-MCNC: 76 MG/DL (ref 65–99)
HCT VFR BLD AUTO: 32 % (ref 37.5–51)
HGB BLD-MCNC: 10.5 G/DL (ref 13–17.7)
IMM GRANULOCYTES # BLD: 0 X10E3/UL (ref 0–0.1)
IMM GRANULOCYTES NFR BLD: 0 %
LIPASE SERPL-CCNC: 23 U/L (ref 13–78)
LYMPHOCYTES # BLD AUTO: 1.6 X10E3/UL (ref 0.7–3.1)
LYMPHOCYTES NFR BLD AUTO: 20 %
MCH RBC QN AUTO: 28.5 PG (ref 26.6–33)
MCHC RBC AUTO-ENTMCNC: 32.8 G/DL (ref 31.5–35.7)
MCV RBC AUTO: 87 FL (ref 79–97)
MONOCYTES # BLD AUTO: 0.8 X10E3/UL (ref 0.1–0.9)
MONOCYTES NFR BLD AUTO: 10 %
NEUTROPHILS # BLD AUTO: 5.3 X10E3/UL (ref 1.4–7)
NEUTROPHILS NFR BLD AUTO: 68 %
PLATELET # BLD AUTO: 366 X10E3/UL (ref 150–379)
POTASSIUM SERPL-SCNC: 4.7 MMOL/L (ref 3.5–5.2)
PROT SERPL-MCNC: 6.8 G/DL (ref 6–8.5)
RBC # BLD AUTO: 3.68 X10E6/UL (ref 4.14–5.8)
SODIUM SERPL-SCNC: 138 MMOL/L (ref 134–144)
WBC # BLD AUTO: 7.9 X10E3/UL (ref 3.4–10.8)

## 2018-12-28 NOTE — PROGRESS NOTES
New onset anemia, recommend daily iron supplement. No evidence of infection. A message has been sent in [employer] and the lab work released to the patient.

## 2019-01-10 ENCOUNTER — OFFICE VISIT (OUTPATIENT)
Dept: FAMILY MEDICINE CLINIC | Age: 69
End: 2019-01-10

## 2019-01-10 VITALS
TEMPERATURE: 98.5 F | WEIGHT: 149.8 LBS | DIASTOLIC BLOOD PRESSURE: 89 MMHG | BODY MASS INDEX: 22.7 KG/M2 | HEIGHT: 68 IN | RESPIRATION RATE: 15 BRPM | OXYGEN SATURATION: 95 % | SYSTOLIC BLOOD PRESSURE: 131 MMHG | HEART RATE: 88 BPM

## 2019-01-10 DIAGNOSIS — R10.9 ABDOMINAL CRAMPING: Primary | ICD-10-CM

## 2019-01-10 RX ORDER — RANITIDINE 150 MG/1
150 TABLET, FILM COATED ORAL 2 TIMES DAILY
COMMUNITY
End: 2019-12-04 | Stop reason: ALTCHOICE

## 2019-01-10 NOTE — PROGRESS NOTES
Chief Complaint   Patient presents with    Abdominal Pain     Unresolved      Pt seen in the office today for unresolved stomach cramps and diarrhea  Has been using Zantac with some relief provided      Pt reports that bentyl has not helped symptoms. Pt reports that he goes back and forth between diarrhea and constipation, hears constant gurgling. Subjective: (As above and below)     Chief Complaint   Patient presents with    Abdominal Pain     Unresolved      he is a 70y.o. year old male who presents for evaluation. Reviewed PmHx, RxHx, FmHx, SocHx, AllgHx and updated in chart. Review of Systems - negative except as listed above    Objective:     Vitals:    01/10/19 1604   BP: 131/89   Pulse: 88   Resp: 15   Temp: 98.5 °F (36.9 °C)   TempSrc: Oral   SpO2: 95%   Weight: 149 lb 12.8 oz (67.9 kg)   Height: 5' 8\" (1.727 m)     Physical Examination: General appearance - alert, well appearing, and in no distress  Mental status - normal mood, behavior, speech, dress, motor activity, and thought processes  Mouth - mucous membranes moist, pharynx normal without lesions  Chest - clear to auscultation, no wheezes, rales or rhonchi, symmetric air entry  Heart - normal rate, regular rhythm, normal S1, S2, no murmurs, rubs, clicks or gallops  Abdomen - normal bowel sounds, soft  Musculoskeletal - no joint tenderness, deformity or swelling    Assessment/ Plan:   1. Abdominal cramping  -refer to GI, labs and x-ray normal  - REFERRAL TO GASTROENTEROLOGY     Follow-up Disposition: As needed  I have discussed the diagnosis with the patient and the intended plan as seen in the above orders. The patient has received an after-visit summary and questions were answered concerning future plans.      Medication Side Effects and Warnings were discussed with patient: yes  Patient Labs were reviewed: yes  Patient Past Records were reviewed:  yes    Ashley Cárdenas M.D.

## 2019-01-18 ENCOUNTER — TELEPHONE (OUTPATIENT)
Dept: FAMILY MEDICINE CLINIC | Age: 69
End: 2019-01-18

## 2019-01-18 NOTE — TELEPHONE ENCOUNTER
Returned call and lmvm that needs to know the name of the medication. Dr Ocasio not in office today.

## 2019-01-18 NOTE — TELEPHONE ENCOUNTER
----- Message from Alina Buchanan sent at 1/18/2019 10:06 AM EST -----  Regarding: Dr. Ocasio/ telephone  Contact: [phone]  Pt is requesting a refill for Rx headache medication.

## 2019-01-21 RX ORDER — AMITRIPTYLINE HYDROCHLORIDE 50 MG/1
TABLET, FILM COATED ORAL
Qty: 90 TAB | Refills: 3 | Status: SHIPPED | OUTPATIENT
Start: 2019-01-21 | End: 2019-02-19

## 2019-01-21 NOTE — TELEPHONE ENCOUNTER
----- Message from Lucia Phillips sent at 1/21/2019 12:07 PM EST -----  Regarding: Dr. Ocasio/ refill   Contact: [phone]  Pt is advising the medication name is Rx amitriplyne

## 2019-01-30 ENCOUNTER — DOCUMENTATION ONLY (OUTPATIENT)
Dept: FAMILY MEDICINE CLINIC | Age: 69
End: 2019-01-30

## 2019-01-30 NOTE — PROGRESS NOTES
Faxed 01/10/19 office notes & 12/27/18 office notes/lab results to Dr Romell Heimlich per [facility]. Fax #[phone] confirmation received.

## 2019-02-19 ENCOUNTER — OFFICE VISIT (OUTPATIENT)
Dept: FAMILY MEDICINE CLINIC | Age: 69
End: 2019-02-19

## 2019-02-19 VITALS
SYSTOLIC BLOOD PRESSURE: 127 MMHG | BODY MASS INDEX: 23.64 KG/M2 | DIASTOLIC BLOOD PRESSURE: 80 MMHG | RESPIRATION RATE: 16 BRPM | HEIGHT: 68 IN | HEART RATE: 98 BPM | OXYGEN SATURATION: 97 % | TEMPERATURE: 97.9 F | WEIGHT: 156 LBS

## 2019-02-19 DIAGNOSIS — I10 ESSENTIAL HYPERTENSION: ICD-10-CM

## 2019-02-19 DIAGNOSIS — Z00.00 MEDICARE ANNUAL WELLNESS VISIT, SUBSEQUENT: ICD-10-CM

## 2019-02-19 DIAGNOSIS — E07.9 THYROID DISEASE: Primary | ICD-10-CM

## 2019-02-19 DIAGNOSIS — R51.9 NONINTRACTABLE EPISODIC HEADACHE, UNSPECIFIED HEADACHE TYPE: ICD-10-CM

## 2019-02-19 DIAGNOSIS — Z13.39 SCREENING FOR ALCOHOLISM: ICD-10-CM

## 2019-02-19 DIAGNOSIS — D64.9 ANEMIA, UNSPECIFIED TYPE: ICD-10-CM

## 2019-02-19 RX ORDER — OMEPRAZOLE 20 MG/1
20 CAPSULE, DELAYED RELEASE ORAL DAILY
COMMUNITY
End: 2019-12-04 | Stop reason: ALTCHOICE

## 2019-02-19 RX ORDER — LOSARTAN POTASSIUM AND HYDROCHLOROTHIAZIDE 12.5; 1 MG/1; MG/1
1 TABLET ORAL DAILY
Qty: 90 TAB | Refills: 3 | Status: SHIPPED | OUTPATIENT
Start: 2019-02-19 | End: 2020-02-09 | Stop reason: SDUPTHER

## 2019-02-19 NOTE — PROGRESS NOTES
Sanjana Hinojosa is a 70 y.o. male   Chief Complaint   Patient presents with    [facility] Annual Wellness Visit    Labs    Lethargy    Medication Refill     losartan-HCTZ    pt here for medicare wellness visit and is currently on synthroid 175 for underactive thyroid. Pt is having some fatigue thru out the day and will recheck this level today. Also with HTN and controlled on current medication, hyzaar 100/12. 5. Pt was also on bentyl prn for stomach cramping but has not needed this for quite q while and if he needs a refill will send a message. Pt also with anemia on prior labs in 12/18 and is taking a MVI with iron. Pt had a colonoscopy in June 2018 and this was normal per pt. Pt does not donate blood. Pt also on elavil and would like to stop this. Pt was taking for HA's and states these have completely resolved. Pt can't remember last HA. Discussed 1/2 tab for 1 week then stop.      he is a 70y.o. year old male who presents for evalution. Reviewed PmHx, RxHx, FmHx, SocHx, AllgHx and updated and dated in the chart. Review of Systems - negative except as listed above in the HPI    Objective:     Vitals:    02/19/19 0741   BP: 127/80   Pulse: 98   Resp: 16   Temp: 97.9 °F (36.6 °C)   TempSrc: Oral   SpO2: 97%   Weight: 156 lb (70.8 kg)   Height: 5' 8\" (1.727 m)       Current Outpatient Medications   Medication Sig    losartan-hydroCHLOROthiazide (HYZAAR) 100-12.5 mg per tablet Take 1 Tab by mouth daily.  omeprazole (PRILOSEC) 20 mg capsule Take 20 mg by mouth daily.  varicella-zoster recombinant, PF, (SHINGRIX, PF,) 50 mcg/0.5 mL susr injection 0.5 mL by IntraMUSCular route once for 1 dose. 2 dose series    raNITIdine (ZANTAC) 150 mg tablet Take 150 mg by mouth two (2) times a day.  citalopram (CELEXA) 40 mg tablet Take 1 Tab by mouth daily.     levocetirizine (XYZAL) 5 mg tablet TAKE ONE TABLET BY MOUTH ONCE DAILY    levothyroxine (LEVOXYL) 175 mcg tablet Take 175 mcg by mouth Daily (before breakfast).  fluticasone (FLONASE) 50 mcg/actuation nasal spray 2 Sprays by Both Nostrils route daily. Administer to right and left nostrils. No current facility-administered medications for this visit. Physical Examination: General appearance - alert, well appearing, and in no distress  Mental status - alert, oriented to person, place, and time  Eyes - pupils equal and reactive, extraocular eye movements intact  Ears - bilateral TM's and external ear canals normal  Mouth - mucous membranes moist, pharynx normal without lesions  Neck - supple, no significant adenopathy  Lymphatics - no palpable lymphadenopathy, no hepatosplenomegaly  Chest - clear to auscultation, no wheezes, rales or rhonchi, symmetric air entry  Heart - normal rate, regular rhythm, normal S1, S2, no murmurs, rubs, clicks or gallops  Abdomen - soft, nontender, nondistended, no masses or organomegaly  Back exam - full range of motion, no tenderness, palpable spasm or pain on motion  Neurological - alert, oriented, normal speech, no focal findings or movement disorder noted  Musculoskeletal - no joint tenderness, deformity or swelling  Extremities - peripheral pulses normal, no pedal edema, no clubbing or cyanosis      Assessment/ Plan:   Diagnoses and all orders for this visit:    1. Thyroid disease  -     TSH 3RD GENERATION    2. Essential hypertension  -     losartan-hydroCHLOROthiazide (HYZAAR) 100-12.5 mg per tablet; Take 1 Tab by mouth daily.  -     METABOLIC PANEL, COMPREHENSIVE    3. Medicare annual wellness visit, subsequent  -     CBC WITH AUTOMATED DIFF    4. Screening for alcoholism  -     [place] ANNUAL ALCOHOL SCREEN 15 MIN    5. Anemia, unspecified type  -     IRON PROFILE  -     FERRITIN    6. Nonintractable episodic headache, unspecified headache type    Other orders  -     varicella-zoster recombinant, PF, (SHINGRIX, PF,) 50 mcg/0.5 mL susr injection; 0.5 mL by IntraMUSCular route once for 1 dose.  2 dose series     resolved HA d/c elavil  Follow-up Disposition:  Return in about 6 months (around 8/19/2019), or if symptoms worsen or fail to improve. I have discussed the diagnosis with the patient and the intended plan as seen in the above orders. The patient has received an after-visit summary and questions were answered concerning future plans. Pt conveyed understanding of plan. Medication Side Effects and Warnings were discussed with patient      Jaren De Los Santos, DO     This is the Subsequent Medicare Annual Wellness Exam, performed 12 months or more after the Initial AWV or the last Subsequent AWV    I have reviewed the patient's medical history in detail and updated the computerized patient record. History     Past Medical History:   Diagnosis Date    Hypertension     Thyroid disease       Past Surgical History:   Procedure Laterality Date    HX LIPOMA RESECTION      left shoulder    HX ORTHOPAEDIC      left shoulder     Current Outpatient Medications   Medication Sig Dispense Refill    losartan-hydroCHLOROthiazide (HYZAAR) 100-12.5 mg per tablet Take 1 Tab by mouth daily. 90 Tab 3    omeprazole (PRILOSEC) 20 mg capsule Take 20 mg by mouth daily.  varicella-zoster recombinant, PF, (SHINGRIX, PF,) 50 mcg/0.5 mL susr injection 0.5 mL by IntraMUSCular route once for 1 dose. 2 dose series 0.5 mL 0    raNITIdine (ZANTAC) 150 mg tablet Take 150 mg by mouth two (2) times a day.  citalopram (CELEXA) 40 mg tablet Take 1 Tab by mouth daily. 30 Tab 5    levocetirizine (XYZAL) 5 mg tablet TAKE ONE TABLET BY MOUTH ONCE DAILY 30 Tab 11    levothyroxine (LEVOXYL) 175 mcg tablet Take 175 mcg by mouth Daily (before breakfast).  fluticasone (FLONASE) 50 mcg/actuation nasal spray 2 Sprays by Both Nostrils route daily. Administer to right and left nostrils.  1 Bottle 5     No Known Allergies  Family History   Problem Relation Age of Onset    Heart Disease Mother     Heart Attack Father      Social History     Tobacco Use  Smoking status: Former Smoker    Smokeless tobacco: Never Used   Substance Use Topics    Alcohol use: Yes     Patient Active Problem List   Diagnosis Code    Thyroid disease E07.9    Hypertension I10       Depression Risk Factor Screening:     3 most recent PHQ Screens 2019   PHQ Not Done Active Diagnosis of Depression or Bipolar Disorder   Little interest or pleasure in doing things Not at all   Feeling down, depressed, irritable, or hopeless Not at all   Total Score PHQ 2 0   Trouble falling or staying asleep, or sleeping too much -   Feeling tired or having little energy -   Poor appetite, weight loss, or overeating -   Feeling bad about yourself - or that you are a failure or have let yourself or your family down -   Trouble concentrating on things such as school, work, reading, or watching TV -   Moving or speaking so slowly that other people could have noticed; or the opposite being so fidgety that others notice -   Thoughts of being better off dead, or hurting yourself in some way -   PHQ 9 Score -   How difficult have these problems made it for you to do your work, take care of your home and get along with others -     Alcohol Risk Factor Screenin beers per night    Functional Ability and Level of Safety:   Hearing Loss  Hearing is good. Activities of Daily Living  The home contains: no safety equipment. Patient does total self care    Fall Risk  Fall Risk Assessment, last 12 mths 2019   Able to walk? Yes   Fall in past 12 months?  No   Fall with injury? -   Number of falls in past 12 months -   Fall Risk Score -       Abuse Screen  Patient is not abused    Cognitive Screening   Evaluation of Cognitive Function:  Has your family/caregiver stated any concerns about your memory: yes  Normal normal 3 question test    Patient Care Team   Patient Care Team:  Debby Oconnell MD as PCP - General (Family Practice)    Assessment/Plan   Education and counseling provided:  Are appropriate based on today's review and evaluation    Diagnoses and all orders for this visit:    1. Thyroid disease  -     TSH 3RD GENERATION    2. Essential hypertension  -     losartan-hydroCHLOROthiazide (HYZAAR) 100-12.5 mg per tablet; Take 1 Tab by mouth daily.  -     METABOLIC PANEL, COMPREHENSIVE    3. Medicare annual wellness visit, subsequent  -     CBC WITH AUTOMATED DIFF    4. Screening for alcoholism  -     [place] ANNUAL ALCOHOL SCREEN 15 MIN    5. Anemia, unspecified type  -     IRON PROFILE  -     FERRITIN    6. Nonintractable episodic headache, unspecified headache type    Other orders  -     varicella-zoster recombinant, PF, (SHINGRIX, PF,) 50 mcg/0.5 mL susr injection; 0.5 mL by IntraMUSCular route once for 1 dose. 2 dose series        Health Maintenance Due   Topic Date Due    Shingrix Vaccine Age 49> (1 of 2) 08/31/1999    GLAUCOMA SCREENING Q2Y  02/01/2019     I have discussed the diagnosis with the patient and the intended plan as seen in the above orders. The patient has received an after-visit summary and questions were answered concerning future plans. Pt conveyed understanding of plan.       Dr Kwabena Thomas

## 2019-02-19 NOTE — PROGRESS NOTES
Chief Complaint   Patient presents with   [facility] Annual Wellness Visit    Labs    Lethargy    Medication Refill     losartan-HCTZ     Patient presents in office today for annual medicare wellness. Fasting is not fasting. Has c/o feeling lethargic. Needs a refill on losartan-HCTZ. No other concerns. 1. Have you been to the ER, urgent care clinic since your last visit? Hospitalized since your last visit? Yes When: 1/19/19 patient first for URI     2. Have you seen or consulted any other health care providers outside of the [address] [place] [address] since your last visit? Include any pap smears or colon screening. Yes When: 2/5/19 GI Dr. Yomi Washington    .   Learning Assessment 2/1/2018   PRIMARY LEARNER Patient   HIGHEST LEVEL OF EDUCATION - PRIMARY LEARNER  GRADUATED HIGH SCHOOL OR GED   BARRIERS PRIMARY LEARNER NONE   CO-LEARNER CAREGIVER No   PRIMARY LANGUAGE ENGLISH   LEARNER PREFERENCE PRIMARY DEMONSTRATION   ANSWERED BY patient   RELATIONSHIP SELF

## 2019-02-19 NOTE — PATIENT INSTRUCTIONS
Medicare Wellness Visit, Male    The best way to live healthy is to have a lifestyle where you eat a well-balanced diet, exercise regularly, limit alcohol use, and quit all forms of tobacco/nicotine, if applicable. Regular preventive services are another way to keep healthy. Preventive services (vaccines, screening tests, monitoring & exams) can help personalize your care plan, which helps you manage your own care. Screening tests can find health problems at the earliest stages, when they are easiest to treat. [facility] follows the current, evidence-based guidelines published by the [facility] ([facility]) when recommending preventive services for our patients. Because we follow these guidelines, sometimes recommendations change over time as research supports it. (For example, a prostate screening blood test is no longer routinely recommended for men with no symptoms.)  Of course, you and your doctor may decide to screen more often for some diseases, based on your risk and co-morbidities (chronic disease you are already diagnosed with). Preventive services for you include:  - Medicare offers their members a free annual wellness visit, which is time for you and your primary care provider to discuss and plan for your preventive service needs. Take advantage of this benefit every year!  -All adults over age 72 should receive the recommended pneumonia vaccines. Current USPSTF guidelines recommend a series of two vaccines for the best pneumonia protection.   -All adults should have a flu vaccine yearly and an ECG.  All adults age 61 and older should receive a shingles vaccine once in their lifetime.    -All adults age 38-68 who are overweight should have a diabetes screening test once every three years.   -Other screening tests & preventive services for persons with diabetes include: an eye exam to screen for diabetic retinopathy, a kidney function test, a foot exam, and stricter control over your cholesterol.   -Cardiovascular screening for adults with routine risk involves an electrocardiogram (ECG) at intervals determined by the provider.   -Colorectal cancer screening should be done for adults age 54-65 with no increased risk factors for colorectal cancer. There are a number of acceptable methods of screening for this type of cancer. Each test has its own benefits and drawbacks. Discuss with your provider what is most appropriate for you during your annual wellness visit. The different tests include: colonoscopy (considered the best screening method), a fecal occult blood test, a fecal DNA test, and sigmoidoscopy.  -All adults born between [facility] should be screened once for Hepatitis C.  -An Abdominal Aortic Aneurysm (AAA) Screening is recommended for men age 73-68 who has ever smoked in their lifetime.      Here is a list of your current Health Maintenance items (your personalized list of preventive services) with a due date:  Health Maintenance Due   Topic Date Due    Shingles Vaccine (1 of 2) 08/31/1999    Glaucoma Screening   02/01/2019    Annual Well Visit  02/02/2019

## 2019-02-20 LAB
ALBUMIN SERPL-MCNC: 4.3 G/DL (ref 3.6–4.8)
ALBUMIN/GLOB SERPL: 2.2 {RATIO} (ref 1.2–2.2)
ALP SERPL-CCNC: 68 IU/L (ref 39–117)
ALT SERPL-CCNC: 14 IU/L (ref 0–44)
AST SERPL-CCNC: 18 IU/L (ref 0–40)
BASOPHILS # BLD AUTO: 0.1 X10E3/UL (ref 0–0.2)
BASOPHILS NFR BLD AUTO: 1 %
BILIRUB SERPL-MCNC: 0.3 MG/DL (ref 0–1.2)
BUN SERPL-MCNC: 14 MG/DL (ref 8–27)
BUN/CREAT SERPL: 13 (ref 10–24)
CALCIUM SERPL-MCNC: 9.5 MG/DL (ref 8.6–10.2)
CHLORIDE SERPL-SCNC: 96 MMOL/L (ref 96–106)
CO2 SERPL-SCNC: 24 MMOL/L (ref 20–29)
CREAT SERPL-MCNC: 1.12 MG/DL (ref 0.76–1.27)
EOSINOPHIL # BLD AUTO: 0.5 X10E3/UL (ref 0–0.4)
EOSINOPHIL NFR BLD AUTO: 7 %
ERYTHROCYTE [DISTWIDTH] IN BLOOD BY AUTOMATED COUNT: 16.7 % (ref 12.3–15.4)
FERRITIN SERPL-MCNC: 7 NG/ML (ref 30–400)
GLOBULIN SER CALC-MCNC: 2 G/DL (ref 1.5–4.5)
GLUCOSE SERPL-MCNC: 61 MG/DL (ref 65–99)
HCT VFR BLD AUTO: 31.3 % (ref 37.5–51)
HGB BLD-MCNC: 9.8 G/DL (ref 13–17.7)
IMM GRANULOCYTES # BLD AUTO: 0 X10E3/UL (ref 0–0.1)
IMM GRANULOCYTES NFR BLD AUTO: 0 %
IRON SATN MFR SERPL: 6 % (ref 15–55)
IRON SERPL-MCNC: 24 UG/DL (ref 38–169)
LYMPHOCYTES # BLD AUTO: 1.5 X10E3/UL (ref 0.7–3.1)
LYMPHOCYTES NFR BLD AUTO: 22 %
MCH RBC QN AUTO: 24.7 PG (ref 26.6–33)
MCHC RBC AUTO-ENTMCNC: 31.3 G/DL (ref 31.5–35.7)
MCV RBC AUTO: 79 FL (ref 79–97)
MONOCYTES # BLD AUTO: 1 X10E3/UL (ref 0.1–0.9)
MONOCYTES NFR BLD AUTO: 15 %
NEUTROPHILS # BLD AUTO: 3.7 X10E3/UL (ref 1.4–7)
NEUTROPHILS NFR BLD AUTO: 55 %
PLATELET # BLD AUTO: 367 X10E3/UL (ref 150–379)
POTASSIUM SERPL-SCNC: 4.9 MMOL/L (ref 3.5–5.2)
PROT SERPL-MCNC: 6.3 G/DL (ref 6–8.5)
RBC # BLD AUTO: 3.96 X10E6/UL (ref 4.14–5.8)
SODIUM SERPL-SCNC: 136 MMOL/L (ref 134–144)
TIBC SERPL-MCNC: 421 UG/DL (ref 250–450)
TSH SERPL DL<=0.005 MIU/L-ACNC: 0.42 UIU/ML (ref 0.45–4.5)
UIBC SERPL-MCNC: 397 UG/DL (ref 111–343)
WBC # BLD AUTO: 6.8 X10E3/UL (ref 3.4–10.8)

## 2019-02-20 RX ORDER — LANOLIN ALCOHOL/MO/W.PET/CERES
CREAM (GRAM) TOPICAL
Qty: 60 TAB | Refills: 2 | Status: SHIPPED | OUTPATIENT
Start: 2019-02-20 | End: 2021-12-03

## 2019-02-20 NOTE — PROGRESS NOTES
You have iron deficiency anemia. I sent in iron pills to take 2x a day with food. This is why you feel tired. These pills can cause constipation and black stools. If you are getting constipation then use miralax OTC daily. We need to recheck your levels in 6-8 weeks. Please make a follow up. Your thyroid level is slightly off. I will keep your dose of thyroid medication the same and recheck this when you follow up since it is borderline.

## 2019-02-27 DIAGNOSIS — R10.9 ABDOMINAL CRAMPING: ICD-10-CM

## 2019-03-19 ENCOUNTER — HOSPITAL ENCOUNTER (OUTPATIENT)
Dept: LAB | Age: 69
Discharge: HOME | End: 2019-03-19
Payer: MEDICARE

## 2019-03-19 ENCOUNTER — OFFICE VISIT (OUTPATIENT)
Dept: FAMILY MEDICINE CLINIC | Age: 69
End: 2019-03-19

## 2019-03-19 VITALS
HEART RATE: 79 BPM | SYSTOLIC BLOOD PRESSURE: 114 MMHG | HEIGHT: 68 IN | BODY MASS INDEX: 23.95 KG/M2 | OXYGEN SATURATION: 96 % | TEMPERATURE: 97.6 F | RESPIRATION RATE: 18 BRPM | DIASTOLIC BLOOD PRESSURE: 75 MMHG | WEIGHT: 158 LBS

## 2019-03-19 DIAGNOSIS — E07.9 THYROID DISEASE: ICD-10-CM

## 2019-03-19 DIAGNOSIS — R79.89 LOW TSH LEVEL: Primary | ICD-10-CM

## 2019-03-19 DIAGNOSIS — R79.89 ABNORMAL TSH: ICD-10-CM

## 2019-03-19 DIAGNOSIS — D50.9 IRON DEFICIENCY ANEMIA, UNSPECIFIED IRON DEFICIENCY ANEMIA TYPE: ICD-10-CM

## 2019-03-19 DIAGNOSIS — Z12.5 SPECIAL SCREENING FOR MALIGNANT NEOPLASM OF PROSTATE: ICD-10-CM

## 2019-03-19 PROCEDURE — 83550 IRON BINDING TEST: CPT

## 2019-03-19 PROCEDURE — 84443 ASSAY THYROID STIM HORMONE: CPT

## 2019-03-19 PROCEDURE — 82728 ASSAY OF FERRITIN: CPT

## 2019-03-19 PROCEDURE — 84153 ASSAY OF PSA TOTAL: CPT

## 2019-03-19 PROCEDURE — 85025 COMPLETE CBC W/AUTO DIFF WBC: CPT

## 2019-03-19 NOTE — PATIENT INSTRUCTIONS
A Healthy Lifestyle: Care Instructions  Your Care Instructions    A healthy lifestyle can help you feel good, stay at a healthy weight, and have plenty of energy for both work and play. A healthy lifestyle is something you can share with your whole family. A healthy lifestyle also can lower your risk for serious health problems, such as high blood pressure, heart disease, and diabetes. You can follow a few steps listed below to improve your health and the health of your family. Follow-up care is a key part of your treatment and safety. Be sure to make and go to all appointments, and call your doctor if you are having problems. It's also a good idea to know your test results and keep a list of the medicines you take. How can you care for yourself at home? · Do not eat too much sugar, fat, or fast foods. You can still have dessert and treats now and then. The goal is moderation. · Start small to improve your eating habits. Pay attention to portion sizes, drink less juice and soda pop, and eat more fruits and vegetables. ? Eat a healthy amount of food. A 3-ounce serving of meat, for example, is about the size of a deck of cards. Fill the rest of your plate with vegetables and whole grains. ? Limit the amount of soda and sports drinks you have every day. Drink more water when you are thirsty. ? Eat at least 5 servings of fruits and vegetables every day. It may seem like a lot, but it is not hard to reach this goal. A serving or helping is 1 piece of fruit, 1 cup of vegetables, or 2 cups of leafy, raw vegetables. Have an apple or some carrot sticks as an afternoon snack instead of a candy bar. Try to have fruits and/or vegetables at every meal.  · Make exercise part of your daily routine. You may want to start with simple activities, such as walking, bicycling, or slow swimming. Try to be active 30 to 60 minutes every day. You do not need to do all 30 to 60 minutes all at once.  For example, you can exercise 3 times a day for 10 or 20 minutes. Moderate exercise is safe for most people, but it is always a good idea to talk to your doctor before starting an exercise program.  · Keep moving. [employer] the lawn, work in the garden, or [employer]. Take the stairs instead of the elevator at work. · If you smoke, quit. People who smoke have an increased risk for heart attack, stroke, cancer, and other lung illnesses. Quitting is hard, but there are ways to boost your chance of quitting tobacco for good. ? Use nicotine gum, patches, or lozenges. ? Ask your doctor about stop-smoking programs and medicines. ? Keep trying. In addition to reducing your risk of diseases in the future, you will notice some benefits soon after you stop using tobacco. If you have shortness of breath or asthma symptoms, they will likely get better within a few weeks after you quit. · Limit how much alcohol you drink. Moderate amounts of alcohol (up to 2 drinks a day for men, 1 drink a day for women) are okay. But drinking too much can lead to liver problems, high blood pressure, and other health problems. Family health  If you have a family, there are many things you can do together to improve your health. · Eat meals together as a family as often as possible. · Eat healthy foods. This includes fruits, vegetables, lean meats and dairy, and whole grains. · Include your family in your fitness plan. Most people think of activities such as jogging or tennis as the way to fitness, but there are many ways you and your family can be more active. Anything that makes you breathe hard and gets your heart pumping is exercise. Here are some tips:  ? Walk to do errands or to take your child to school or the bus.  ? Go for a family bike ride after dinner instead of watching TV. Where can you learn more? Go to http://paz-sophie.info/. Enter I228 in the search box to learn more about \"A Healthy Lifestyle: Care Instructions. \"  Current as of: September 11, 2018  Content Version: 11.9  © 6523-7053 [employer]. Care instructions adapted under license by [employer] (which disclaims liability or warranty for this information). If you have questions about a medical condition or this instruction, always ask your healthcare professional. [address] any warranty or liability for your use of this information.

## 2019-03-19 NOTE — PROGRESS NOTES
Chief Complaint   Patient presents with   [address]    Constipation     Patient presents in office today for f/u to labs. Patient has c/o still having issues with constipation. He states that he has a f/u apt with GI in about a month. He states that he is afraid to take a laxative as he is worried about having issues with diarrhea. Would also like to have PSA and cholesterol checked today. No other concerns. 1. Have you been to the ER, urgent care clinic since your last visit? Hospitalized since your last visit? No    2. Have you seen or consulted any other health care providers outside of the [address] [place] [address] since your last visit? Include any pap smears or colon screening.  No    Learning Assessment 2/1/2018   PRIMARY LEARNER Patient   HIGHEST LEVEL OF EDUCATION - PRIMARY LEARNER  GRADUATED HIGH SCHOOL OR GED   BARRIERS PRIMARY LEARNER NONE   CO-LEARNER CAREGIVER No   PRIMARY LANGUAGE ENGLISH   LEARNER PREFERENCE PRIMARY DEMONSTRATION   ANSWERED BY patient   RELATIONSHIP SELF

## 2019-03-19 NOTE — PROGRESS NOTES
Sherry Alex is a 70 y.o. male   Chief Complaint   Patient presents with    Follow-up    Labs    Constipation    pt here for f/u for iron def anemia and states he is feeling less lethargic. States stools are dark green and pt is constipated. Pt also with hypothyroid on 175 mcg synthroid and last check his TSH was slightly low. No palpitations. Pt also has f/u with GI in a month. Pt is requesting a PSA be checked today. he is a 70y.o. year old male who presents for evalution. Reviewed PmHx, RxHx, FmHx, SocHx, AllgHx and updated and dated in the chart. Review of Systems - negative except as listed above in the HPI    Objective:     Vitals:    03/19/19 0850   BP: 114/75   Pulse: 79   Resp: 18   Temp: 97.6 °F (36.4 °C)   TempSrc: Oral   SpO2: 96%   Weight: 158 lb (71.7 kg)   Height: 5' 8\" (1.727 m)       Current Outpatient Medications   Medication Sig    ferrous sulfate 325 mg (65 mg iron) tablet 1 tab PO bid with food    losartan-hydroCHLOROthiazide (HYZAAR) 100-12.5 mg per tablet Take 1 Tab by mouth daily.  omeprazole (PRILOSEC) 20 mg capsule Take 20 mg by mouth daily.  raNITIdine (ZANTAC) 150 mg tablet Take 150 mg by mouth two (2) times a day.  citalopram (CELEXA) 40 mg tablet Take 1 Tab by mouth daily.  levocetirizine (XYZAL) 5 mg tablet TAKE ONE TABLET BY MOUTH ONCE DAILY    levothyroxine (LEVOXYL) 175 mcg tablet Take 175 mcg by mouth Daily (before breakfast).  fluticasone (FLONASE) 50 mcg/actuation nasal spray 2 Sprays by Both Nostrils route daily. Administer to right and left nostrils. No current facility-administered medications for this visit. Physical Examination: General appearance - alert, well appearing, and in no distress  Chest - clear to auscultation, no wheezes, rales or rhonchi, symmetric air entry  Heart - normal rate, regular rhythm, normal S1, S2, no murmurs, rubs, clicks or gallops      Assessment/ Plan:   Diagnoses and all orders for this visit:    1. Low TSH level  -     TSH 3RD GENERATION    2. Iron deficiency anemia, unspecified iron deficiency anemia type  -     CBC WITH AUTOMATED DIFF  -     IRON PROFILE  -     FERRITIN    3. Abnormal TSH  -     TSH 3RD GENERATION    4. Thyroid disease  -     TSH 3RD GENERATION    5. Special screening for malignant neoplasm of prostate  -     PSA SCREENING (SCREENING)       Follow-up Disposition:  Return if symptoms worsen or fail to improve. I have discussed the diagnosis with the patient and the intended plan as seen in the above orders. The patient has received an after-visit summary and questions were answered concerning future plans. Pt conveyed understanding of plan.     Medication Side Effects and Warnings were discussed with patient      [address] [facility] [address], DO

## 2019-03-20 LAB
BASOPHILS # BLD AUTO: 0 X10E3/UL (ref 0–0.2)
BASOPHILS NFR BLD AUTO: 1 %
EOSINOPHIL # BLD AUTO: 0.5 X10E3/UL (ref 0–0.4)
EOSINOPHIL NFR BLD AUTO: 5 %
ERYTHROCYTE [DISTWIDTH] IN BLOOD BY AUTOMATED COUNT: 23.8 % (ref 12.3–15.4)
FERRITIN SERPL-MCNC: 19 NG/ML (ref 30–400)
HCT VFR BLD AUTO: 38.3 % (ref 37.5–51)
HGB BLD-MCNC: 12.2 G/DL (ref 13–17.7)
IMM GRANULOCYTES # BLD AUTO: 0 X10E3/UL (ref 0–0.1)
IMM GRANULOCYTES NFR BLD AUTO: 0 %
IRON SATN MFR SERPL: 55 % (ref 15–55)
IRON SERPL-MCNC: 220 UG/DL (ref 38–169)
LYMPHOCYTES # BLD AUTO: 1.6 X10E3/UL (ref 0.7–3.1)
LYMPHOCYTES NFR BLD AUTO: 18 %
MCH RBC QN AUTO: 27 PG (ref 26.6–33)
MCHC RBC AUTO-ENTMCNC: 31.9 G/DL (ref 31.5–35.7)
MCV RBC AUTO: 85 FL (ref 79–97)
MONOCYTES # BLD AUTO: 0.7 X10E3/UL (ref 0.1–0.9)
MONOCYTES NFR BLD AUTO: 8 %
MORPHOLOGY BLD-IMP: ABNORMAL
NEUTROPHILS # BLD AUTO: 5.9 X10E3/UL (ref 1.4–7)
NEUTROPHILS NFR BLD AUTO: 68 %
PLATELET # BLD AUTO: 391 X10E3/UL (ref 150–379)
PSA SERPL-MCNC: 2.5 NG/ML (ref 0–4)
RBC # BLD AUTO: 4.52 X10E6/UL (ref 4.14–5.8)
TIBC SERPL-MCNC: 400 UG/DL (ref 250–450)
TSH SERPL DL<=0.005 MIU/L-ACNC: 0.41 UIU/ML (ref 0.45–4.5)
UIBC SERPL-MCNC: 180 UG/DL (ref 111–343)
WBC # BLD AUTO: 8.7 X10E3/UL (ref 3.4–10.8)

## 2019-04-03 ENCOUNTER — OFFICE VISIT (OUTPATIENT)
Dept: FAMILY MEDICINE CLINIC | Age: 69
End: 2019-04-03

## 2019-04-03 VITALS
DIASTOLIC BLOOD PRESSURE: 88 MMHG | SYSTOLIC BLOOD PRESSURE: 138 MMHG | HEIGHT: 68 IN | BODY MASS INDEX: 24.25 KG/M2 | TEMPERATURE: 97.8 F | HEART RATE: 80 BPM | RESPIRATION RATE: 18 BRPM | OXYGEN SATURATION: 98 % | WEIGHT: 160 LBS

## 2019-04-03 DIAGNOSIS — S13.4XXA WHIPLASH INJURIES, INITIAL ENCOUNTER: Primary | ICD-10-CM

## 2019-04-03 DIAGNOSIS — M54.2 NECK PAIN: ICD-10-CM
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
## 2019-04-30 ENCOUNTER — OFFICE VISIT (OUTPATIENT)
Dept: FAMILY MEDICINE CLINIC | Age: 69
End: 2019-04-30

## 2019-04-30 VITALS
DIASTOLIC BLOOD PRESSURE: 90 MMHG | HEART RATE: 78 BPM | WEIGHT: 159 LBS | SYSTOLIC BLOOD PRESSURE: 145 MMHG | OXYGEN SATURATION: 95 % | HEIGHT: 68 IN | RESPIRATION RATE: 16 BRPM | BODY MASS INDEX: 24.1 KG/M2 | TEMPERATURE: 98.2 F

## 2019-04-30 DIAGNOSIS — M79.18 LEVATOR SCAPULA SYNDROME: ICD-10-CM

## 2019-04-30 DIAGNOSIS — S16.1XXA STRAIN OF NECK MUSCLE, INITIAL ENCOUNTER: Primary | ICD-10-CM
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
## 2019-05-19 DIAGNOSIS — F41.8 DEPRESSION WITH ANXIETY: ICD-10-CM

## 2019-05-20 RX ORDER — CITALOPRAM 40 MG/1
40 TABLET, FILM COATED ORAL DAILY
Qty: 30 TAB | Refills: 5 | Status: SHIPPED | OUTPATIENT
Start: 2019-05-20 | End: 2019-11-15 | Stop reason: SDUPTHER

## 2019-05-21 ENCOUNTER — OFFICE VISIT (OUTPATIENT)
Dept: FAMILY MEDICINE CLINIC | Age: 69
End: 2019-05-21

## 2019-05-21 ENCOUNTER — HOSPITAL ENCOUNTER (OUTPATIENT)
Dept: LAB | Age: 69
Discharge: HOME | End: 2019-05-21
Payer: MEDICARE

## 2019-05-21 VITALS
WEIGHT: 161 LBS | OXYGEN SATURATION: 94 % | DIASTOLIC BLOOD PRESSURE: 93 MMHG | TEMPERATURE: 97.8 F | BODY MASS INDEX: 24.4 KG/M2 | RESPIRATION RATE: 16 BRPM | HEIGHT: 68 IN | HEART RATE: 74 BPM | SYSTOLIC BLOOD PRESSURE: 137 MMHG

## 2019-05-21 DIAGNOSIS — D64.9 ANEMIA, UNSPECIFIED TYPE: Primary | ICD-10-CM

## 2019-05-21 PROCEDURE — 82728 ASSAY OF FERRITIN: CPT

## 2019-05-21 PROCEDURE — 85025 COMPLETE CBC W/AUTO DIFF WBC: CPT

## 2019-05-21 PROCEDURE — 83550 IRON BINDING TEST: CPT

## 2019-05-21 NOTE — PATIENT INSTRUCTIONS
A Healthy Lifestyle: Care Instructions  Your Care Instructions    A healthy lifestyle can help you feel good, stay at a healthy weight, and have plenty of energy for both work and play. A healthy lifestyle is something you can share with your whole family. A healthy lifestyle also can lower your risk for serious health problems, such as high blood pressure, heart disease, and diabetes. You can follow a few steps listed below to improve your health and the health of your family. Follow-up care is a key part of your treatment and safety. Be sure to make and go to all appointments, and call your doctor if you are having problems. It's also a good idea to know your test results and keep a list of the medicines you take. How can you care for yourself at home? · Do not eat too much sugar, fat, or fast foods. You can still have dessert and treats now and then. The goal is moderation. · Start small to improve your eating habits. Pay attention to portion sizes, drink less juice and soda pop, and eat more fruits and vegetables. ? Eat a healthy amount of food. A 3-ounce serving of meat, for example, is about the size of a deck of cards. Fill the rest of your plate with vegetables and whole grains. ? Limit the amount of soda and sports drinks you have every day. Drink more water when you are thirsty. ? Eat at least 5 servings of fruits and vegetables every day. It may seem like a lot, but it is not hard to reach this goal. A serving or helping is 1 piece of fruit, 1 cup of vegetables, or 2 cups of leafy, raw vegetables. Have an apple or some carrot sticks as an afternoon snack instead of a candy bar. Try to have fruits and/or vegetables at every meal.  · Make exercise part of your daily routine. You may want to start with simple activities, such as walking, bicycling, or slow swimming. Try to be active 30 to 60 minutes every day. You do not need to do all 30 to 60 minutes all at once.  For example, you can exercise 3 times a day for 10 or 20 minutes. Moderate exercise is safe for most people, but it is always a good idea to talk to your doctor before starting an exercise program.  · Keep moving. [place] the lawn, work in the garden, or [employer]. Take the stairs instead of the elevator at work. · If you smoke, quit. People who smoke have an increased risk for heart attack, stroke, cancer, and other lung illnesses. Quitting is hard, but there are ways to boost your chance of quitting tobacco for good. ? Use nicotine gum, patches, or lozenges. ? Ask your doctor about stop-smoking programs and medicines. ? Keep trying. In addition to reducing your risk of diseases in the future, you will notice some benefits soon after you stop using tobacco. If you have shortness of breath or asthma symptoms, they will likely get better within a few weeks after you quit. · Limit how much alcohol you drink. Moderate amounts of alcohol (up to 2 drinks a day for men, 1 drink a day for women) are okay. But drinking too much can lead to liver problems, high blood pressure, and other health problems. Family health  If you have a family, there are many things you can do together to improve your health. · Eat meals together as a family as often as possible. · Eat healthy foods. This includes fruits, vegetables, lean meats and dairy, and whole grains. · Include your family in your fitness plan. Most people think of activities such as jogging or tennis as the way to fitness, but there are many ways you and your family can be more active. Anything that makes you breathe hard and gets your heart pumping is exercise. Here are some tips:  ? Walk to do errands or to take your child to school or the bus.  ? Go for a family bike ride after dinner instead of watching TV. Where can you learn more? Go to http://paz-sophie.info/. Enter K672 in the search box to learn more about \"A Healthy Lifestyle: Care Instructions. \"  Current as of: September 11, 2018  Content Version: 11.9  © 4135-9463 [employer]. Care instructions adapted under license by [employer] (which disclaims liability or warranty for this information). If you have questions about a medical condition or this instruction, always ask your healthcare professional. [address] any warranty or liability for your use of this information.

## 2019-05-21 NOTE — PROGRESS NOTES
Chief Complaint   Patient presents with   [address]     Patient presents in office today for f/u to labs. No concerns. 1. Have you been to the ER, urgent care clinic since your last visit? Hospitalized since your last visit? No    2. Have you seen or consulted any other health care providers outside of the [address] [place] [address] since your last visit? Include any pap smears or colon screening.  No    Learning Assessment 2/1/2018   PRIMARY LEARNER Patient   HIGHEST LEVEL OF EDUCATION - PRIMARY LEARNER  GRADUATED HIGH SCHOOL OR GED   BARRIERS PRIMARY LEARNER NONE   CO-LEARNER CAREGIVER No   PRIMARY LANGUAGE ENGLISH   LEARNER PREFERENCE PRIMARY DEMONSTRATION   ANSWERED BY patient   RELATIONSHIP SELF

## 2019-05-22 LAB
BASOPHILS # BLD AUTO: 0.1 X10E3/UL (ref 0–0.2)
BASOPHILS NFR BLD AUTO: 1 %
EOSINOPHIL # BLD AUTO: 0.4 X10E3/UL (ref 0–0.4)
EOSINOPHIL NFR BLD AUTO: 6 %
ERYTHROCYTE [DISTWIDTH] IN BLOOD BY AUTOMATED COUNT: 15.9 % (ref 12.3–15.4)
FERRITIN SERPL-MCNC: 23 NG/ML (ref 30–400)
HCT VFR BLD AUTO: 43 % (ref 37.5–51)
HGB BLD-MCNC: 14.3 G/DL (ref 13–17.7)
IMM GRANULOCYTES # BLD AUTO: 0 X10E3/UL (ref 0–0.1)
IMM GRANULOCYTES NFR BLD AUTO: 0 %
IRON SATN MFR SERPL: 37 % (ref 15–55)
IRON SERPL-MCNC: 133 UG/DL (ref 38–169)
LYMPHOCYTES # BLD AUTO: 1.2 X10E3/UL (ref 0.7–3.1)
LYMPHOCYTES NFR BLD AUTO: 17 %
MCH RBC QN AUTO: 31 PG (ref 26.6–33)
MCHC RBC AUTO-ENTMCNC: 33.3 G/DL (ref 31.5–35.7)
MCV RBC AUTO: 93 FL (ref 79–97)
MONOCYTES # BLD AUTO: 0.8 X10E3/UL (ref 0.1–0.9)
MONOCYTES NFR BLD AUTO: 12 %
NEUTROPHILS # BLD AUTO: 4.4 X10E3/UL (ref 1.4–7)
NEUTROPHILS NFR BLD AUTO: 64 %
PLATELET # BLD AUTO: 286 X10E3/UL (ref 150–450)
RBC # BLD AUTO: 4.62 X10E6/UL (ref 4.14–5.8)
TIBC SERPL-MCNC: 363 UG/DL (ref 250–450)
UIBC SERPL-MCNC: 230 UG/DL (ref 111–343)
WBC # BLD AUTO: 6.8 X10E3/UL (ref 3.4–10.8)

## 2019-05-22 NOTE — PROGRESS NOTES
Your anemia has resolved but your ferritin level is low still. Switch to a multivitamin daily with iron and you can stop the Rx iron pills.   I would like to recheck your levels in 3 months

## 2019-07-11 ENCOUNTER — TELEPHONE (OUTPATIENT)
Dept: FAMILY MEDICINE CLINIC | Age: 69
End: 2019-07-11

## 2019-07-11 RX ORDER — ONDANSETRON 8 MG/1
8 TABLET, ORALLY DISINTEGRATING ORAL
Qty: 20 TAB | Refills: 0 | Status: SHIPPED | OUTPATIENT
Start: 2019-07-11 | End: 2019-08-14 | Stop reason: SDUPTHER

## 2019-07-11 NOTE — TELEPHONE ENCOUNTER
Pt notified and upset that nurse is not finding the reason why this happens,advsied pt this office can only manage sx as they occur; since pt already see gi specialist they typically complete the work up to est why sx are occurring and they also treat. Pt states whatever and hangs up.

## 2019-07-11 NOTE — TELEPHONE ENCOUNTER
----- Message from Adam Last sent at 7/11/2019 11:03 AM EDT -----  Regarding: /Telephone  Pt requesting a call back regarding further treatment options due to stomach cramps and vomiting. Best [phone]

## 2019-08-14 RX ORDER — ONDANSETRON 8 MG/1
8 TABLET, ORALLY DISINTEGRATING ORAL
Qty: 20 TAB | Refills: 0 | Status: SHIPPED | OUTPATIENT
Start: 2019-08-14 | End: 2019-09-24 | Stop reason: SDUPTHER

## 2019-09-24 RX ORDER — ONDANSETRON 8 MG/1
8 TABLET, ORALLY DISINTEGRATING ORAL
Qty: 20 TAB | Refills: 0 | Status: SHIPPED | OUTPATIENT
Start: 2019-09-24 | End: 2021-12-03

## 2019-11-15 DIAGNOSIS — F41.8 DEPRESSION WITH ANXIETY: ICD-10-CM

## 2019-11-15 RX ORDER — CITALOPRAM 40 MG/1
40 TABLET, FILM COATED ORAL DAILY
Qty: 30 TAB | Refills: 5 | Status: SHIPPED | OUTPATIENT
Start: 2019-11-15 | End: 2020-05-12 | Stop reason: SDUPTHER

## 2019-12-04 ENCOUNTER — OFFICE VISIT (OUTPATIENT)
Dept: FAMILY MEDICINE CLINIC | Age: 69
End: 2019-12-04

## 2019-12-04 VITALS
RESPIRATION RATE: 20 BRPM | TEMPERATURE: 97.9 F | WEIGHT: 150 LBS | DIASTOLIC BLOOD PRESSURE: 87 MMHG | OXYGEN SATURATION: 96 % | HEIGHT: 68 IN | HEART RATE: 68 BPM | SYSTOLIC BLOOD PRESSURE: 139 MMHG | BODY MASS INDEX: 22.73 KG/M2

## 2019-12-04 DIAGNOSIS — B34.9 VIRAL SYNDROME: Primary | ICD-10-CM

## 2019-12-04 DIAGNOSIS — I10 ESSENTIAL HYPERTENSION: ICD-10-CM

## 2019-12-04 RX ORDER — PANTOPRAZOLE SODIUM 40 MG/1
40 TABLET, DELAYED RELEASE ORAL DAILY
COMMUNITY

## 2019-12-04 RX ORDER — FAMOTIDINE 10 MG/1
10 TABLET ORAL DAILY
COMMUNITY
End: 2021-12-03

## 2019-12-04 NOTE — PROGRESS NOTES
Patient here and states he has had a fever low grade x several weeks. His left ear was blocked. These sx have been resolved. 1. Have you been to the ER, urgent care clinic since your last visit? Hospitalized since your last visit? No    2. Have you seen or consulted any other health care providers outside of the [address] [place] [address] since your last visit? Include any pap smears or colon screening. No       Chief Complaint   Patient presents with    Fever     fever 98.5 which is high for him   left ear blocked , better    Medication Evaluation     He is a 78 y.o. male who presents for evalution. Reviewed PmHx, RxHx, FmHx, SocHx, AllgHx and updated and dated in the chart. Patient Active Problem List    Diagnosis    Thyroid disease    Hypertension       Review of Systems - negative except as listed above in the HPI    Objective:     Vitals:    12/04/19 1324   BP: 139/87   Pulse: 68   Resp: 20   Temp: 97.9 °F (36.6 °C)   SpO2: 96%   Weight: 150 lb (68 kg)   Height: 5' 8\" (1.727 m)     Physical Examination: General appearance - alert, well appearing, and in no distress  Neck - supple, no significant adenopathy  Chest - clear to auscultation, no wheezes, rales or rhonchi, symmetric air entry  Heart - normal rate, regular rhythm, normal S1, S2, no murmurs, rubs, clicks or gallops  Abdomen - soft, nontender, nondistended, no masses or organomegaly    Assessment/ Plan:   Diagnoses and all orders for this visit:    1. Viral syndrome  -resolved  BP at goal     Follow-up and Dispositions    · Return if symptoms worsen or fail to improve. I have discussed the diagnosis with the patient and the intended plan as seen in the above orders. The patient understands and agrees with the plan. The patient has received an after-visit summary and questions were answered concerning future plans.      Medication Side Effects and Warnings were discussed with patient  Patient Labs were reviewed and or requested:  Patient Past Records were reviewed and or requested    Nuha Arriola M.D. There are no Patient Instructions on file for this visit.           \

## 2019-12-26 ENCOUNTER — OFFICE VISIT (OUTPATIENT)
Dept: FAMILY MEDICINE CLINIC | Age: 69
End: 2019-12-26

## 2019-12-26 ENCOUNTER — HOSPITAL ENCOUNTER (OUTPATIENT)
Dept: LAB | Age: 69
Discharge: HOME | End: 2019-12-26

## 2019-12-26 VITALS
OXYGEN SATURATION: 95 % | SYSTOLIC BLOOD PRESSURE: 126 MMHG | BODY MASS INDEX: 22.28 KG/M2 | HEART RATE: 56 BPM | WEIGHT: 147 LBS | TEMPERATURE: 97.7 F | DIASTOLIC BLOOD PRESSURE: 76 MMHG | RESPIRATION RATE: 16 BRPM | HEIGHT: 68 IN

## 2019-12-26 DIAGNOSIS — R09.81 SINUS CONGESTION: ICD-10-CM

## 2019-12-26 DIAGNOSIS — R79.89 ELEVATED SERUM CREATININE: ICD-10-CM

## 2019-12-26 DIAGNOSIS — R79.89 ELEVATED SERUM CREATININE: Primary | ICD-10-CM

## 2019-12-26 LAB
ANION GAP SERPL CALC-SCNC: 5 MMOL/L (ref 5–15)
BUN SERPL-MCNC: 20 MG/DL (ref 6–20)
BUN/CREAT SERPL: 17 (ref 12–20)
CALCIUM SERPL-MCNC: 9.6 MG/DL (ref 8.5–10.1)
CHLORIDE SERPL-SCNC: 100 MMOL/L (ref 97–108)
CO2 SERPL-SCNC: 29 MMOL/L (ref 21–32)
CREAT SERPL-MCNC: 1.18 MG/DL (ref 0.7–1.3)
GLUCOSE SERPL-MCNC: 57 MG/DL (ref 65–100)
POTASSIUM SERPL-SCNC: 4.4 MMOL/L (ref 3.5–5.1)
SODIUM SERPL-SCNC: 134 MMOL/L (ref 136–145)

## 2019-12-26 RX ORDER — MONTELUKAST SODIUM 10 MG/1
10 TABLET ORAL DAILY
Qty: 30 TAB | Refills: 1 | Status: SHIPPED | OUTPATIENT
Start: 2019-12-26 | End: 2020-02-20 | Stop reason: SDUPTHER

## 2019-12-26 RX ORDER — PREDNISONE 10 MG/1
TABLET ORAL
Qty: 21 TAB | Refills: 0 | Status: SHIPPED | OUTPATIENT
Start: 2019-12-26 | End: 2020-01-15

## 2019-12-26 NOTE — PROGRESS NOTES
Wally Ellison is a 78 y.o. male   Chief Complaint   Patient presents with    Follow-up     [facility]    pt here for [facility].  He had first seen Dr Deirdre Alegria and was dx with a virus and by the time he was seen here he was feeling better. Pt then started to feel worse and on the 14th [facility] CXR and this did not show PNA. Was dx with bronchitis and sinusitis. He finished a course of doxy. He states his L ear is feeling stuffed up and is having a lot of congestion in his sinuses. States from his shoulders down feels great. No issues with coughing since this has improved. His Cr was 1.5 at [facility] as well and prior we have on record was 1.12.      he is a 78y.o. year old male who presents for evalution. Reviewed PmHx, RxHx, FmHx, SocHx, AllgHx and updated and dated in the chart. Review of Systems - negative except as listed above in the HPI    Objective:     Vitals:    12/26/19 0823   BP: 126/76   Pulse: (!) 56   Resp: 16   Temp: 97.7 °F (36.5 °C)   TempSrc: Oral   SpO2: 95%   Weight: 147 lb (66.7 kg)   Height: 5' 8\" (1.727 m)       Current Outpatient Medications   Medication Sig    predniSONE (STERAPRED DS) 10 mg dose pack See administration instruction per 10mg dose pack    montelukast (SINGULAIR) 10 mg tablet Take 1 Tab by mouth daily.  pantoprazole (PROTONIX) 40 mg tablet Take 40 mg by mouth daily.  famotidine (PEPCID) 10 mg tablet Take 10 mg by mouth daily.  citalopram (CELEXA) 40 mg tablet Take 1 Tab by mouth daily.  ondansetron (ZOFRAN ODT) 8 mg disintegrating tablet Take 1 Tab by mouth every eight (8) hours as needed for Nausea.  losartan-hydroCHLOROthiazide (HYZAAR) 100-12.5 mg per tablet Take 1 Tab by mouth daily.  levothyroxine (LEVOXYL) 175 mcg tablet Take 175 mcg by mouth Daily (before breakfast).  ferrous sulfate 325 mg (65 mg iron) tablet 1 tab PO bid with food     No current facility-administered medications for this visit. Physical Examination: General appearance - alert, well appearing, and in no distress  Eyes - pupils equal and reactive, extraocular eye movements intact  Ears - bilateral TM's and external ear canals normal  Nose - mucosal congestion and clear rhinorrhea  Mouth - mucous membranes moist, pharynx normal without lesions  Neck - supple, no significant adenopathy  Chest - clear to auscultation, no wheezes, rales or rhonchi, symmetric air entry  Heart - normal rate, regular rhythm, normal S1, S2, no murmurs, rubs, clicks or gallops      Assessment/ Plan:   Diagnoses and all orders for this visit:    1. Elevated serum creatinine  -     METABOLIC PANEL, BASIC; Future    2. Sinus congestion  -     predniSONE (STERAPRED DS) 10 mg dose pack; See administration instruction per 10mg dose pack  -     montelukast (SINGULAIR) 10 mg tablet; Take 1 Tab by mouth daily. Follow-up and Dispositions    · Return if symptoms worsen or fail to improve. I have discussed the diagnosis with the patient and the intended plan as seen in the above orders. The patient has received an after-visit summary and questions were answered concerning future plans. Pt conveyed understanding of plan.     Medication Side Effects and Warnings were discussed with patient      Stephany Okeefe DO

## 2019-12-26 NOTE — PATIENT INSTRUCTIONS
A Healthy Lifestyle: Care Instructions  Your Care Instructions    A healthy lifestyle can help you feel good, stay at a healthy weight, and have plenty of energy for both work and play. A healthy lifestyle is something you can share with your whole family. A healthy lifestyle also can lower your risk for serious health problems, such as high blood pressure, heart disease, and diabetes. You can follow a few steps listed below to improve your health and the health of your family. Follow-up care is a key part of your treatment and safety. Be sure to make and go to all appointments, and call your doctor if you are having problems. It's also a good idea to know your test results and keep a list of the medicines you take. How can you care for yourself at home? · Do not eat too much sugar, fat, or fast foods. You can still have dessert and treats now and then. The goal is moderation. · Start small to improve your eating habits. Pay attention to portion sizes, drink less juice and soda pop, and eat more fruits and vegetables. ? Eat a healthy amount of food. A 3-ounce serving of meat, for example, is about the size of a deck of cards. Fill the rest of your plate with vegetables and whole grains. ? Limit the amount of soda and sports drinks you have every day. Drink more water when you are thirsty. ? Eat at least 5 servings of fruits and vegetables every day. It may seem like a lot, but it is not hard to reach this goal. A serving or helping is 1 piece of fruit, 1 cup of vegetables, or 2 cups of leafy, raw vegetables. Have an apple or some carrot sticks as an afternoon snack instead of a candy bar. Try to have fruits and/or vegetables at every meal.  · Make exercise part of your daily routine. You may want to start with simple activities, such as walking, bicycling, or slow swimming. Try to be active 30 to 60 minutes every day. You do not need to do all 30 to 60 minutes all at once.  For example, you can exercise 3 times a day for 10 or 20 minutes. Moderate exercise is safe for most people, but it is always a good idea to talk to your doctor before starting an exercise program.  · Keep moving. [place] the lawn, work in the garden, or [employer]. Take the stairs instead of the elevator at work. · If you smoke, quit. People who smoke have an increased risk for heart attack, stroke, cancer, and other lung illnesses. Quitting is hard, but there are ways to boost your chance of quitting tobacco for good. ? Use nicotine gum, patches, or lozenges. ? Ask your doctor about stop-smoking programs and medicines. ? Keep trying. In addition to reducing your risk of diseases in the future, you will notice some benefits soon after you stop using tobacco. If you have shortness of breath or asthma symptoms, they will likely get better within a few weeks after you quit. · Limit how much alcohol you drink. Moderate amounts of alcohol (up to 2 drinks a day for men, 1 drink a day for women) are okay. But drinking too much can lead to liver problems, high blood pressure, and other health problems. Family health  If you have a family, there are many things you can do together to improve your health. · Eat meals together as a family as often as possible. · Eat healthy foods. This includes fruits, vegetables, lean meats and dairy, and whole grains. · Include your family in your fitness plan. Most people think of activities such as jogging or tennis as the way to fitness, but there are many ways you and your family can be more active. Anything that makes you breathe hard and gets your heart pumping is exercise. Here are some tips:  ? Walk to do errands or to take your child to school or the bus.  ? Go for a family bike ride after dinner instead of watching TV. Where can you learn more? Go to http://paz-sophie.info/. Enter W727 in the search box to learn more about \"A Healthy Lifestyle: Care Instructions. \"  Current as of: May 28, 2019  Content Version: 12.2  © 6705-0127 [employer]. Care instructions adapted under license by [email or web address] (which disclaims liability or warranty for this information). If you have questions about a medical condition or this instruction, always ask your healthcare professional. [address] any warranty or liability for your use of this information.

## 2019-12-26 NOTE — PROGRESS NOTES
Chief Complaint   Patient presents with    Follow-up     [facility]     Patient presents in office today for f[facility].  Was seen on 12/14/19 for fever, cough and congestion. Was sent home on doxycyline. Has finished course of abx. No other concerns. 1. Have you been to the ER, urgent care clinic since your last visit? Hospitalized since your last visit? Yes When: 12/14/19 Where: [facility] Reason for visit: cough, congestion    2. Have you seen or consulted any other health care providers outside of the [address] [place] [address] since your last visit? Include any pap smears or colon screening.  No    Learning Assessment 2/1/2018   PRIMARY LEARNER Patient   HIGHEST LEVEL OF EDUCATION - PRIMARY LEARNER  GRADUATED HIGH SCHOOL OR GED   BARRIERS PRIMARY LEARNER NONE   CO-LEARNER CAREGIVER No   PRIMARY LANGUAGE ENGLISH   LEARNER PREFERENCE PRIMARY DEMONSTRATION   ANSWERED BY patient   RELATIONSHIP SELF

## 2020-01-15 ENCOUNTER — OFFICE VISIT (OUTPATIENT)
Dept: FAMILY MEDICINE CLINIC | Age: 70
End: 2020-01-15

## 2020-01-15 VITALS
TEMPERATURE: 98 F | SYSTOLIC BLOOD PRESSURE: 108 MMHG | OXYGEN SATURATION: 98 % | HEART RATE: 80 BPM | HEIGHT: 68 IN | WEIGHT: 147.4 LBS | RESPIRATION RATE: 20 BRPM | BODY MASS INDEX: 22.34 KG/M2 | DIASTOLIC BLOOD PRESSURE: 68 MMHG

## 2020-01-15 DIAGNOSIS — J01.20 ACUTE NON-RECURRENT ETHMOIDAL SINUSITIS: Primary | ICD-10-CM

## 2020-01-15 RX ORDER — IBUPROFEN 800 MG/1
800 TABLET ORAL
Qty: 60 TAB | Refills: 2 | Status: SHIPPED | OUTPATIENT
Start: 2020-01-15 | End: 2021-09-08 | Stop reason: SDUPTHER

## 2020-01-15 RX ORDER — LEVOFLOXACIN 500 MG/1
500 TABLET, FILM COATED ORAL DAILY
Qty: 10 TAB | Refills: 0 | Status: SHIPPED | OUTPATIENT
Start: 2020-01-15 | End: 2020-01-25

## 2020-01-15 NOTE — PROGRESS NOTES
All health maintenance and other pertinent information has been reviewed in preparation for today's office visit. Patient presents in the office today for:    . Chief Complaint   Patient presents with    Pressure Behind the Eyes     Pt c/o severe sinus pressure, congestion x's 4 months. States it has subsided after taking course of corticosteriods but has reoccured. 1. Have you been to the ER, urgent care clinic since your last visit? Hospitalized since your last visit? No    2. Have you seen or consulted any other health care providers outside of the [address] [place] [address] since your last visit? Include any pap smears or colon screening.  No

## 2020-01-15 NOTE — PATIENT INSTRUCTIONS
A Healthy Lifestyle: Care Instructions  Your Care Instructions    A healthy lifestyle can help you feel good, stay at a healthy weight, and have plenty of energy for both work and play. A healthy lifestyle is something you can share with your whole family. A healthy lifestyle also can lower your risk for serious health problems, such as high blood pressure, heart disease, and diabetes. You can follow a few steps listed below to improve your health and the health of your family. Follow-up care is a key part of your treatment and safety. Be sure to make and go to all appointments, and call your doctor if you are having problems. It's also a good idea to know your test results and keep a list of the medicines you take. How can you care for yourself at home? · Do not eat too much sugar, fat, or fast foods. You can still have dessert and treats now and then. The goal is moderation. · Start small to improve your eating habits. Pay attention to portion sizes, drink less juice and soda pop, and eat more fruits and vegetables. ? Eat a healthy amount of food. A 3-ounce serving of meat, for example, is about the size of a deck of cards. Fill the rest of your plate with vegetables and whole grains. ? Limit the amount of soda and sports drinks you have every day. Drink more water when you are thirsty. ? Eat at least 5 servings of fruits and vegetables every day. It may seem like a lot, but it is not hard to reach this goal. A serving or helping is 1 piece of fruit, 1 cup of vegetables, or 2 cups of leafy, raw vegetables. Have an apple or some carrot sticks as an afternoon snack instead of a candy bar. Try to have fruits and/or vegetables at every meal.  · Make exercise part of your daily routine. You may want to start with simple activities, such as walking, bicycling, or slow swimming. Try to be active 30 to 60 minutes every day. You do not need to do all 30 to 60 minutes all at once.  For example, you can exercise 3 times a day for 10 or 20 minutes. Moderate exercise is safe for most people, but it is always a good idea to talk to your doctor before starting an exercise program.  · Keep moving. Bret Bonds the lawn, work in the garden, or [place]. Take the stairs instead of the elevator at work. · If you smoke, quit. People who smoke have an increased risk for heart attack, stroke, cancer, and other lung illnesses. Quitting is hard, but there are ways to boost your chance of quitting tobacco for good. ? Use nicotine gum, patches, or lozenges. ? Ask your doctor about stop-smoking programs and medicines. ? Keep trying. In addition to reducing your risk of diseases in the future, you will notice some benefits soon after you stop using tobacco. If you have shortness of breath or asthma symptoms, they will likely get better within a few weeks after you quit. · Limit how much alcohol you drink. Moderate amounts of alcohol (up to 2 drinks a day for men, 1 drink a day for women) are okay. But drinking too much can lead to liver problems, high blood pressure, and other health problems. Family health  If you have a family, there are many things you can do together to improve your health. · Eat meals together as a family as often as possible. · Eat healthy foods. This includes fruits, vegetables, lean meats and dairy, and whole grains. · Include your family in your fitness plan. Most people think of activities such as jogging or tennis as the way to fitness, but there are many ways you and your family can be more active. Anything that makes you breathe hard and gets your heart pumping is exercise. Here are some tips:  ? Walk to do errands or to take your child to school or the bus.  ? Go for a family bike ride after dinner instead of watching TV. Where can you learn more? Go to http://paz-sophie.info/. Enter V029 in the search box to learn more about \"A Healthy Lifestyle: Care Instructions. \"  Current as of: May 28, 2019  Content Version: 12.2  © 3951-7759 [employer]. Care instructions adapted under license by [employer] (which disclaims liability or warranty for this information). If you have questions about a medical condition or this instruction, always ask your healthcare professional. [address] any warranty or liability for your use of this information.

## 2020-01-15 NOTE — PROGRESS NOTES
Carlos Sorenson is a 78 y.o. male   Chief Complaint   Patient presents with    Pressure Behind the Eyes     Pt c/o severe sinus pressure, congestion x's 4 months. States it has subsided after taking course of corticosteriods but has reoccured. pt states he has had chronic sinus congestion. I had seen jay villarreal few weeks ago for this and the prednisone was working then his symptoms came back again. States his nose will run like a water fall and others where it will be stuffed up. No fever no chills. States eye are watery. Pt is not taking any allergy meds besides singulair. he is a 78y.o. year old male who presents for evalution. Reviewed PmHx, RxHx, FmHx, SocHx, AllgHx and updated and dated in the chart. Review of Systems - negative except as listed above in the HPI    Objective:     Vitals:    01/15/20 1523   BP: 108/68   Pulse: 80   Resp: 20   Temp: 98 °F (36.7 °C)   SpO2: 98%   Weight: 147 lb 6.4 oz (66.9 kg)   Height: 5' 8\" (1.727 m)       Current Outpatient Medications   Medication Sig    levoFLOXacin (LEVAQUIN) 500 mg tablet Take 1 Tab by mouth daily for 10 days.  ibuprofen (MOTRIN) 800 mg tablet Take 1 Tab by mouth every eight (8) hours as needed for Pain.  montelukast (SINGULAIR) 10 mg tablet Take 1 Tab by mouth daily.  famotidine (PEPCID) 10 mg tablet Take 10 mg by mouth daily.  citalopram (CELEXA) 40 mg tablet Take 1 Tab by mouth daily.  ondansetron (ZOFRAN ODT) 8 mg disintegrating tablet Take 1 Tab by mouth every eight (8) hours as needed for Nausea.  losartan-hydroCHLOROthiazide (HYZAAR) 100-12.5 mg per tablet Take 1 Tab by mouth daily.  levothyroxine (LEVOXYL) 175 mcg tablet Take 175 mcg by mouth Daily (before breakfast).  pantoprazole (PROTONIX) 40 mg tablet Take 40 mg by mouth daily.  ferrous sulfate 325 mg (65 mg iron) tablet 1 tab PO bid with food     No current facility-administered medications for this visit.         Physical Examination: General appearance - alert, well appearing, and in no distress  Ears - bilateral TM's and external ear canals normal  Nose - mucosal congestion and mucosal erythema  Mouth - mucous membranes moist, pharynx normal without lesions  Neck - supple, no significant adenopathy  Chest - clear to auscultation, no wheezes, rales or rhonchi, symmetric air entry  Heart - normal rate, regular rhythm, normal S1, S2, no murmurs, rubs, clicks or gallops      Assessment/ Plan:   Diagnoses and all orders for this visit:    1. Acute non-recurrent ethmoidal sinusitis  -     levoFLOXacin (LEVAQUIN) 500 mg tablet; Take 1 Tab by mouth daily for 10 days. -     ibuprofen (MOTRIN) 800 mg tablet; Take 1 Tab by mouth every eight (8) hours as needed for Pain. Follow-up and Dispositions    · Return if symptoms worsen or fail to improve. I have discussed the diagnosis with the patient and the intended plan as seen in the above orders. The patient has received an after-visit summary and questions were answered concerning future plans. Pt conveyed understanding of plan.     Medication Side Effects and Warnings were discussed with patient      Alessandra Weinberg DO

## 2020-02-09 DIAGNOSIS — I10 ESSENTIAL HYPERTENSION: ICD-10-CM

## 2020-02-11 RX ORDER — LOSARTAN POTASSIUM AND HYDROCHLOROTHIAZIDE 12.5; 1 MG/1; MG/1
1 TABLET ORAL DAILY
Qty: 90 TAB | Refills: 3 | Status: SHIPPED | OUTPATIENT
Start: 2020-02-11 | End: 2021-02-03 | Stop reason: SDUPTHER

## 2020-02-20 ENCOUNTER — HOSPITAL ENCOUNTER (OUTPATIENT)
Dept: LAB | Age: 70
Discharge: HOME | End: 2020-02-20

## 2020-02-20 ENCOUNTER — OFFICE VISIT (OUTPATIENT)
Dept: FAMILY MEDICINE CLINIC | Age: 70
End: 2020-02-20

## 2020-02-20 VITALS
DIASTOLIC BLOOD PRESSURE: 73 MMHG | HEIGHT: 68 IN | BODY MASS INDEX: 22.88 KG/M2 | WEIGHT: 151 LBS | RESPIRATION RATE: 18 BRPM | TEMPERATURE: 97.7 F | SYSTOLIC BLOOD PRESSURE: 109 MMHG | OXYGEN SATURATION: 97 % | HEART RATE: 76 BPM

## 2020-02-20 DIAGNOSIS — Z12.5 SPECIAL SCREENING FOR MALIGNANT NEOPLASM OF PROSTATE: ICD-10-CM

## 2020-02-20 DIAGNOSIS — I10 ESSENTIAL HYPERTENSION: ICD-10-CM

## 2020-02-20 DIAGNOSIS — D64.9 ANEMIA, UNSPECIFIED TYPE: ICD-10-CM

## 2020-02-20 DIAGNOSIS — Z00.00 MEDICARE ANNUAL WELLNESS VISIT, SUBSEQUENT: ICD-10-CM

## 2020-02-20 DIAGNOSIS — Z13.39 SCREENING FOR ALCOHOLISM: ICD-10-CM

## 2020-02-20 DIAGNOSIS — R09.81 SINUS CONGESTION: ICD-10-CM

## 2020-02-20 DIAGNOSIS — J31.0 CHRONIC RHINITIS: Primary | ICD-10-CM

## 2020-02-20 DIAGNOSIS — E07.9 THYROID DISEASE: ICD-10-CM

## 2020-02-20 LAB
ALBUMIN SERPL-MCNC: 4 G/DL (ref 3.5–5)
ALBUMIN/GLOB SERPL: 1.7 {RATIO} (ref 1.1–2.2)
ALP SERPL-CCNC: 55 U/L (ref 45–117)
ALT SERPL-CCNC: 16 U/L (ref 12–78)
ANION GAP SERPL CALC-SCNC: 8 MMOL/L (ref 5–15)
AST SERPL-CCNC: 13 U/L (ref 15–37)
BASOPHILS # BLD: 0.1 K/UL (ref 0–0.1)
BASOPHILS NFR BLD: 1 % (ref 0–1)
BILIRUB SERPL-MCNC: 0.3 MG/DL (ref 0.2–1)
BUN SERPL-MCNC: 23 MG/DL (ref 6–20)
BUN/CREAT SERPL: 17 (ref 12–20)
CALCIUM SERPL-MCNC: 9.9 MG/DL (ref 8.5–10.1)
CHLORIDE SERPL-SCNC: 97 MMOL/L (ref 97–108)
CO2 SERPL-SCNC: 29 MMOL/L (ref 21–32)
CREAT SERPL-MCNC: 1.34 MG/DL (ref 0.7–1.3)
DIFFERENTIAL METHOD BLD: ABNORMAL
EOSINOPHIL # BLD: 0.4 K/UL (ref 0–0.4)
EOSINOPHIL NFR BLD: 4 % (ref 0–7)
ERYTHROCYTE [DISTWIDTH] IN BLOOD BY AUTOMATED COUNT: 12.7 % (ref 11.5–14.5)
GLOBULIN SER CALC-MCNC: 2.3 G/DL (ref 2–4)
GLUCOSE SERPL-MCNC: 61 MG/DL (ref 65–100)
HCT VFR BLD AUTO: 41 % (ref 36.6–50.3)
HGB BLD-MCNC: 13.6 G/DL (ref 12.1–17)
IMM GRANULOCYTES # BLD AUTO: 0.1 K/UL (ref 0–0.04)
IMM GRANULOCYTES NFR BLD AUTO: 1 % (ref 0–0.5)
IRON SATN MFR SERPL: 34 % (ref 20–50)
IRON SERPL-MCNC: 130 UG/DL (ref 35–150)
LYMPHOCYTES # BLD: 0.9 K/UL (ref 0.8–3.5)
LYMPHOCYTES NFR BLD: 9 % (ref 12–49)
MCH RBC QN AUTO: 33.9 PG (ref 26–34)
MCHC RBC AUTO-ENTMCNC: 33.2 G/DL (ref 30–36.5)
MCV RBC AUTO: 102.2 FL (ref 80–99)
MONOCYTES # BLD: 1.1 K/UL (ref 0–1)
MONOCYTES NFR BLD: 11 % (ref 5–13)
NEUTS SEG # BLD: 7.7 K/UL (ref 1.8–8)
NEUTS SEG NFR BLD: 74 % (ref 32–75)
NRBC # BLD: 0 K/UL (ref 0–0.01)
NRBC BLD-RTO: 0 PER 100 WBC
PLATELET # BLD AUTO: 261 K/UL (ref 150–400)
PMV BLD AUTO: 12.1 FL (ref 8.9–12.9)
POTASSIUM SERPL-SCNC: 5 MMOL/L (ref 3.5–5.1)
PROT SERPL-MCNC: 6.3 G/DL (ref 6.4–8.2)
PSA SERPL-MCNC: 2.7 NG/ML (ref 0.01–4)
RBC # BLD AUTO: 4.01 M/UL (ref 4.1–5.7)
SODIUM SERPL-SCNC: 134 MMOL/L (ref 136–145)
TIBC SERPL-MCNC: 383 UG/DL (ref 250–450)
TSH SERPL DL<=0.05 MIU/L-ACNC: 1.31 UIU/ML (ref 0.36–3.74)
WBC # BLD AUTO: 10.2 K/UL (ref 4.1–11.1)

## 2020-02-20 RX ORDER — BISMUTH SUBSALICYLATE 262 MG
1 TABLET,CHEWABLE ORAL DAILY
COMMUNITY

## 2020-02-20 RX ORDER — MONTELUKAST SODIUM 10 MG/1
10 TABLET ORAL DAILY
Qty: 90 TAB | Refills: 3 | Status: SHIPPED | OUTPATIENT
Start: 2020-02-20 | End: 2021-12-03

## 2020-02-20 RX ORDER — AZELASTINE HCL 205.5 UG/1
2 SPRAY NASAL 2 TIMES DAILY
Qty: 1 BOTTLE | Refills: 11 | Status: SHIPPED | OUTPATIENT
Start: 2020-02-20 | End: 2021-12-03

## 2020-02-20 NOTE — PROGRESS NOTES
Chief Complaint   Patient presents with   [facility] Wellness Visit     Patient presents in office today for AMW visit. Patient is not fasting. Has c/o feeling lethargic. No other concerns. 1. Have you been to the ER, urgent care clinic since your last visit? Hospitalized since your last visit? No    2. Have you seen or consulted any other health care providers outside of the [address] [place] [address] since your last visit? Include any pap smears or colon screening.  No    Learning Assessment 2/1/2018   PRIMARY LEARNER Patient   HIGHEST LEVEL OF EDUCATION - PRIMARY LEARNER  GRADUATED HIGH SCHOOL OR GED   BARRIERS PRIMARY LEARNER NONE   CO-LEARNER CAREGIVER No   PRIMARY LANGUAGE ENGLISH   LEARNER PREFERENCE PRIMARY DEMONSTRATION   ANSWERED BY patient   RELATIONSHIP SELF

## 2020-02-20 NOTE — PATIENT INSTRUCTIONS
Medicare Wellness Visit, Male    The best way to live healthy is to have a lifestyle where you eat a well-balanced diet, exercise regularly, limit alcohol use, and quit all forms of tobacco/nicotine, if applicable. Regular preventive services are another way to keep healthy. Preventive services (vaccines, screening tests, monitoring & exams) can help personalize your care plan, which helps you manage your own care. Screening tests can find health problems at the earliest stages, when they are easiest to treat. [facility] follows the current, evidence-based guidelines published by the [facility] ([facility]) when recommending preventive services for our patients. Because we follow these guidelines, sometimes recommendations change over time as research supports it. (For example, a prostate screening blood test is no longer routinely recommended for men with no symptoms). Of course, you and your doctor may decide to screen more often for some diseases, based on your risk and co-morbidities (chronic disease you are already diagnosed with). Preventive services for you include:  - Medicare offers their members a free annual wellness visit, which is time for you and your primary care provider to discuss and plan for your preventive service needs. Take advantage of this benefit every year!  -All adults over age 72 should receive the recommended pneumonia vaccines. Current USPSTF guidelines recommend a series of two vaccines for the best pneumonia protection.   -All adults should have a flu vaccine yearly and tetanus vaccine every 10 years.  -All adults age 48 and older should receive the shingles vaccines (series of two vaccines).        -All adults age 38-68 who are overweight should have a diabetes screening test once every three years.   -Other screening tests & preventive services for persons with diabetes include: an eye exam to screen for diabetic retinopathy, a kidney function test, a foot exam, and stricter control over your cholesterol.   -Cardiovascular screening for adults with routine risk involves an electrocardiogram (ECG) at intervals determined by the provider.   -Colorectal cancer screening should be done for adults age 54-65 with no increased risk factors for colorectal cancer. There are a number of acceptable methods of screening for this type of cancer. Each test has its own benefits and drawbacks. Discuss with your provider what is most appropriate for you during your annual wellness visit. The different tests include: colonoscopy (considered the best screening method), a fecal occult blood test, a fecal DNA test, and sigmoidoscopy.  -All adults born between [facility] should be screened once for Hepatitis C.  -An Abdominal Aortic Aneurysm (AAA) Screening is recommended for men age 73-68 who has ever smoked in their lifetime.      Here is a list of your current Health Maintenance items (your personalized list of preventive services) with a due date:  Health Maintenance Due   Topic Date Due    Shingles Vaccine (1 of 2) 08/31/1999    AAA Screening  08/31/2014    Glaucoma Screening   02/01/2019    Annual Well Visit  02/20/2020

## 2020-02-20 NOTE — PROGRESS NOTES
Mia Don is a 78 y.o. male   Chief Complaint   Patient presents with    Annual Wellness Visit    pt here for his [address] and states he is getting congestion of his sinuses and will get very itchy and watery eyes. Pt is taking singulair and is not taking the xyzal now. Not using any nasal sprays and discussed trial of astepro. Hx of anemia is taking a MVI with iron currently and tolerates this fine. HTN remains well controlled on medication. Pt has an appt with endo for his thyroid next month and will check a tsh for this. On brand name levoxyl. he is a 78y.o. year old male who presents for evalution. Reviewed PmHx, RxHx, FmHx, SocHx, AllgHx and updated and dated in the chart. Review of Systems - negative except as listed above in the HPI    Objective:     Vitals:    02/20/20 0917   BP: 109/73   Pulse: 76   Resp: 18   Temp: 97.7 °F (36.5 °C)   TempSrc: Oral   SpO2: 97%   Weight: 151 lb (68.5 kg)   Height: 5' 8\" (1.727 m)       Current Outpatient Medications   Medication Sig    multivitamin (ONE A DAY) tablet Take 1 Tab by mouth daily.  azelastine (ASTEPRO) 0.15 % (205.5 mcg) 2 Sprays by Both Nostrils route two (2) times a day.  montelukast (SINGULAIR) 10 mg tablet Take 1 Tab by mouth daily.  losartan-hydroCHLOROthiazide (HYZAAR) 100-12.5 mg per tablet Take 1 Tab by mouth daily.  ibuprofen (MOTRIN) 800 mg tablet Take 1 Tab by mouth every eight (8) hours as needed for Pain.  pantoprazole (PROTONIX) 40 mg tablet Take 40 mg by mouth daily.  famotidine (PEPCID) 10 mg tablet Take 10 mg by mouth daily.  citalopram (CELEXA) 40 mg tablet Take 1 Tab by mouth daily.  ondansetron (ZOFRAN ODT) 8 mg disintegrating tablet Take 1 Tab by mouth every eight (8) hours as needed for Nausea.  levothyroxine (LEVOXYL) 175 mcg tablet Take 175 mcg by mouth Daily (before breakfast).     ferrous sulfate 325 mg (65 mg iron) tablet 1 tab PO bid with food     No current facility-administered medications for this visit. Physical Examination: General appearance - alert, well appearing, and in no distress  Eyes - pupils equal and reactive, extraocular eye movements intact  Ears - bilateral TM's and external ear canals normal  Mouth - mucous membranes moist, pharynx normal without lesions  Neck - supple, no significant adenopathy  Lymphatics - no palpable lymphadenopathy, no hepatosplenomegaly  Chest - clear to auscultation, no wheezes, rales or rhonchi, symmetric air entry  Heart - normal rate, regular rhythm, normal S1, S2, no murmurs, rubs, clicks or gallops  Abdomen - soft, nontender, nondistended, no masses or organomegaly  Musculoskeletal - no joint tenderness, deformity or swelling  Extremities - peripheral pulses normal, no pedal edema, no clubbing or cyanosis      Assessment/ Plan:   Diagnoses and all orders for this visit:    1. Chronic rhinitis  -     azelastine (ASTEPRO) 0.15 % (205.5 mcg); 2 Sprays by Both Nostrils route two (2) times a day. 2. Sinus congestion  -     montelukast (SINGULAIR) 10 mg tablet; Take 1 Tab by mouth daily. 3. Medicare annual wellness visit, subsequent  -     METABOLIC PANEL, COMPREHENSIVE; Future  -     CBC WITH AUTOMATED DIFF; Future    4. Screening for alcoholism  -     [place] ANNUAL ALCOHOL SCREEN 15 MIN    5. Special screening for malignant neoplasm of prostate  -     PSA SCREENING (SCREENING); Future    6. Thyroid disease  -     TSH 3RD GENERATION; Future    7. Essential hypertension  -     METABOLIC PANEL, COMPREHENSIVE; Future    8. Anemia, unspecified type  -     IRON PROFILE; Future       Follow-up and Dispositions    · Return if symptoms worsen or fail to improve. I have discussed the diagnosis with the patient and the intended plan as seen in the above orders. The patient has received an after-visit summary and questions were answered concerning future plans. Pt conveyed understanding of plan.     Medication Side Effects and Warnings were discussed with patient      Sol Carroll, DO     This is the Subsequent Medicare Annual Wellness Exam, performed 12 months or more after the Initial AWV or the last Subsequent AWV    I have reviewed the patient's medical history in detail and updated the computerized patient record. History     Patient Active Problem List   Diagnosis Code    Thyroid disease E07.9    Hypertension I10     Past Medical History:   Diagnosis Date    GERD (gastroesophageal reflux disease)     Hypertension     Thyroid disease       Past Surgical History:   Procedure Laterality Date    HX LIPOMA RESECTION      left shoulder    HX ORTHOPAEDIC      left shoulder     Current Outpatient Medications   Medication Sig Dispense Refill    multivitamin (ONE A DAY) tablet Take 1 Tab by mouth daily.  azelastine (ASTEPRO) 0.15 % (205.5 mcg) 2 Sprays by Both Nostrils route two (2) times a day. 1 Bottle 11    montelukast (SINGULAIR) 10 mg tablet Take 1 Tab by mouth daily. 90 Tab 3    losartan-hydroCHLOROthiazide (HYZAAR) 100-12.5 mg per tablet Take 1 Tab by mouth daily. 90 Tab 3    ibuprofen (MOTRIN) 800 mg tablet Take 1 Tab by mouth every eight (8) hours as needed for Pain. 60 Tab 2    pantoprazole (PROTONIX) 40 mg tablet Take 40 mg by mouth daily.  famotidine (PEPCID) 10 mg tablet Take 10 mg by mouth daily.  citalopram (CELEXA) 40 mg tablet Take 1 Tab by mouth daily. 30 Tab 5    ondansetron (ZOFRAN ODT) 8 mg disintegrating tablet Take 1 Tab by mouth every eight (8) hours as needed for Nausea. 20 Tab 0    levothyroxine (LEVOXYL) 175 mcg tablet Take 175 mcg by mouth Daily (before breakfast).       ferrous sulfate 325 mg (65 mg iron) tablet 1 tab PO bid with food 60 Tab 2     No Known Allergies    Family History   Problem Relation Age of Onset    Heart Disease Mother     Heart Attack Father      Social History     Tobacco Use    Smoking status: Former Smoker     Packs/day: 1.00     Years: 10.00     Pack years: 10.00     Last attempt to quit: 2010     Years since quitting: 10.1    Smokeless tobacco: Never Used   Substance Use Topics    Alcohol use: Yes       Depression Risk Factor Screening:     3 most recent PHQ Screens 1/15/2020   PHQ Not Done -   Little interest or pleasure in doing things Not at all   Feeling down, depressed, irritable, or hopeless Not at all   Total Score PHQ 2 0   Trouble falling or staying asleep, or sleeping too much -   Feeling tired or having little energy -   Poor appetite, weight loss, or overeating -   Feeling bad about yourself - or that you are a failure or have let yourself or your family down -   Trouble concentrating on things such as school, work, reading, or watching TV -   Moving or speaking so slowly that other people could have noticed; or the opposite being so fidgety that others notice -   Thoughts of being better off dead, or hurting yourself in some way -   PHQ 9 Score -   How difficult have these problems made it for you to do your work, take care of your home and get along with others -       Alcohol Risk Factor Screening (MALE > 65): Do you average more 1 drink per night or more than 7 drinks a week: No    In the past three months have you have had more than 4 drinks containing alcohol on one occasion: No      Functional Ability and Level of Safety:   Hearing: Hearing is good. Activities of Daily Living: The home contains: no safety equipment. Patient does total self care    Ambulation: with no difficulty    Fall Risk:  Fall Risk Assessment, last 12 mths 1/15/2020   Able to walk? Yes   Fall in past 12 months?  No   Fall with injury? -   Number of falls in past 12 months -   Fall Risk Score -       Abuse Screen:  Patient is not abused    Cognitive Screening   Has your family/caregiver stated any concerns about your memory: no      Patient Care Team   Patient Care Team:  Anupama Narayanan DO as PCP - General (Family Practice)  Anupama Narayanan DO as PCP - [facility] Provider    Assessment/Plan   Education and counseling provided:  Are appropriate based on today's review and evaluation    Diagnoses and all orders for this visit:    1. Chronic rhinitis  -     azelastine (ASTEPRO) 0.15 % (205.5 mcg); 2 Sprays by Both Nostrils route two (2) times a day. 2. Sinus congestion  -     montelukast (SINGULAIR) 10 mg tablet; Take 1 Tab by mouth daily. 3. Medicare annual wellness visit, subsequent  -     METABOLIC PANEL, COMPREHENSIVE; Future  -     CBC WITH AUTOMATED DIFF; Future    4. Screening for alcoholism  -     [place] ANNUAL ALCOHOL SCREEN 15 MIN    5. Special screening for malignant neoplasm of prostate  -     PSA SCREENING (SCREENING); Future    6. Thyroid disease  -     TSH 3RD GENERATION; Future    7. Essential hypertension  -     METABOLIC PANEL, COMPREHENSIVE; Future    8. Anemia, unspecified type  -     IRON PROFILE; Future        Health Maintenance Due   Topic Date Due    Shingrix Vaccine Age 49> (1 of 2) 08/31/1999    AAA Screening 73-67 YO Male Smoking Patients  08/31/2014    GLAUCOMA SCREENING Q2Y  02/01/2019    Medicare Yearly Exam  02/20/2020     I have discussed the diagnosis with the patient and the intended plan as seen in the above orders. The patient has received an after-visit summary and questions were answered concerning future plans. Pt conveyed understanding of plan.       Dr Elsie Pathak

## 2020-05-12 DIAGNOSIS — F41.8 DEPRESSION WITH ANXIETY: ICD-10-CM

## 2020-05-12 RX ORDER — CITALOPRAM 40 MG/1
40 TABLET, FILM COATED ORAL DAILY
Qty: 30 TAB | Refills: 5 | Status: SHIPPED | OUTPATIENT
Start: 2020-05-12 | End: 2020-11-10 | Stop reason: SDUPTHER

## 2020-06-16 ENCOUNTER — DOCUMENTATION ONLY (OUTPATIENT)
Dept: FAMILY MEDICINE CLINIC | Age: 70
End: 2020-06-16

## 2020-06-16 NOTE — PROGRESS NOTES
[address] [facility]. request for medical records was faxed to [phone] to be processed.

## 2020-10-01 ENCOUNTER — VIRTUAL VISIT (OUTPATIENT)
Dept: FAMILY MEDICINE CLINIC | Age: 70
End: 2020-10-01
Payer: MEDICARE

## 2020-10-01 DIAGNOSIS — R42 VERTIGO: ICD-10-CM

## 2020-10-01 DIAGNOSIS — I10 ESSENTIAL HYPERTENSION: Primary | ICD-10-CM

## 2020-10-01 DIAGNOSIS — R61 UNEXPLAINED NIGHT SWEATS: ICD-10-CM

## 2020-10-01 PROCEDURE — G8427 DOCREV CUR MEDS BY ELIG CLIN: HCPCS | Performed by: NURSE PRACTITIONER

## 2020-10-01 PROCEDURE — 99214 OFFICE O/P EST MOD 30 MIN: CPT | Performed by: NURSE PRACTITIONER

## 2020-10-01 PROCEDURE — G8756 NO BP MEASURE DOC: HCPCS | Performed by: NURSE PRACTITIONER

## 2020-10-01 PROCEDURE — G8432 DEP SCR NOT DOC, RNG: HCPCS | Performed by: NURSE PRACTITIONER

## 2020-10-01 PROCEDURE — 1101F PT FALLS ASSESS-DOCD LE1/YR: CPT | Performed by: NURSE PRACTITIONER

## 2020-10-01 PROCEDURE — 3017F COLORECTAL CA SCREEN DOC REV: CPT | Performed by: NURSE PRACTITIONER

## 2020-10-01 NOTE — PROGRESS NOTES
Bernadette Pacheco is a 69 y.o. male who was seen by synchronous (real-time) audio-video technology on 10/1/2020 for No chief complaint on file. I spent at least 25 minutes on this visit with this established patient. 712  Subjective:   He is having unexplained sweats with no fever  Dizzy spells and unexplained high bp readings  Usually 120/70 and has been 170/90 for the last few days  Absolutely no respiratory sx noted and no missed meds      Prior to Admission medications    Medication Sig Start Date End Date Taking? Authorizing Provider   citalopram (CELEXA) 40 mg tablet Take 1 Tab by mouth daily. 5/12/20   Kajal Eduardo,    multivitamin (ONE A DAY) tablet Take 1 Tab by mouth daily. Provider, Historical   azelastine (ASTEPRO) 0.15 % (205.5 mcg) 2 Sprays by Both Nostrils route two (2) times a day. 2/20/20   Shakir Eduardo DO   montelukast (SINGULAIR) 10 mg tablet Take 1 Tab by mouth daily. 2/20/20   aPpa Eduardo,    losartan-hydroCHLOROthiazide (HYZAAR) 100-12.5 mg per tablet Take 1 Tab by mouth daily. 2/11/20   Shakir Eduardo DO   ibuprofen (MOTRIN) 800 mg tablet Take 1 Tab by mouth every eight (8) hours as needed for Pain. 1/15/20   Shakir Eduardo DO   pantoprazole (PROTONIX) 40 mg tablet Take 40 mg by mouth daily. Provider, Historical   famotidine (PEPCID) 10 mg tablet Take 10 mg by mouth daily. Provider, Historical   ondansetron (ZOFRAN ODT) 8 mg disintegrating tablet Take 1 Tab by mouth every eight (8) hours as needed for Nausea. 9/24/19   Carlo Edmond MD   ferrous sulfate 325 mg (65 mg iron) tablet 1 tab PO bid with food 2/20/19   Shakir Eduardo DO   levothyroxine (LEVOXYL) 175 mcg tablet Take 175 mcg by mouth Daily (before breakfast).     Provider, Historical     Patient Active Problem List    Diagnosis Date Noted    Thyroid disease     Hypertension        ROS  A comprehensive review of system was obtained and negative except findings in the HPI    Objective:     Patient-Reported Vitals 10/1/2020   Patient-Reported Weight 146.6   Patient-Reported Height 5'8\"   Patient-Reported Pulse 65   Patient-Reported Temperature 97.9   Patient-Reported SpO2 N/A   Patient-Reported Systolic  678   Patient-Reported Diastolic 87   Patient-Reported Peak Flow N/A      General: alert, cooperative, no distress   Mental  status: normal mood, behavior, speech, dress, motor activity, and thought processes, able to follow commands   HENT: NCAT   Neck: no visualized mass   Resp: no respiratory distress   Neuro: no gross deficits   Skin: no discoloration or lesions of concern on visible areas   Psychiatric: normal affect, consistent with stated mood, no evidence of hallucinations     Additional exam findings: none    Diagnoses and all orders for this visit:    1. Essential hypertension    2. Vertigo    3. Unexplained night sweats      Given vague sx of unusually high bp, dizzy spells, and unexplained sweats needs to go to ER for cardiac workup  He will go to [facility] ER for eval      We discussed the expected course, resolution and complications of the diagnosis(es) in detail. Medication risks, benefits, costs, interactions, and alternatives were discussed as indicated. I advised him to contact the office if his condition worsens, changes or fails to improve as anticipated. He expressed understanding with the diagnosis(es) and plan. Renata Burt, who was evaluated through a patient-initiated, synchronous (real-time) audio-video encounter, and/or his healthcare decision maker, is aware that it is a billable service, with coverage as determined by his insurance carrier. He provided verbal consent to proceed: Yes, and patient identification was verified. It was conducted pursuant to the emergency declaration under the [address] [place] [address] and the [employer] and [employer] General Act. A caregiver was present when appropriate.  Ability to conduct physical exam was limited. I was in the office. The patient was at home.       Janeth Hunter NP

## 2020-10-02 ENCOUNTER — PATIENT MESSAGE (OUTPATIENT)
Dept: FAMILY MEDICINE CLINIC | Age: 70
End: 2020-10-02

## 2020-10-06 ENCOUNTER — DOCUMENTATION ONLY (OUTPATIENT)
Dept: FAMILY MEDICINE CLINIC | Age: 70
End: 2020-10-06

## 2020-10-06 NOTE — PROGRESS NOTES
Pt dropped off patient visit info from [facility], left in bin up front. Thanks.

## 2020-10-13 DIAGNOSIS — R91.1 LUNG NODULE: Primary | ICD-10-CM

## 2020-10-16 DIAGNOSIS — R91.1 LUNG NODULE: ICD-10-CM

## 2020-10-26 ENCOUNTER — HOSPITAL ENCOUNTER (OUTPATIENT)
Dept: GENERAL RADIOLOGY | Age: 70
Discharge: HOME | End: 2020-10-26
Payer: MEDICARE

## 2020-10-26 DIAGNOSIS — R91.1 LUNG NODULE: Primary | ICD-10-CM

## 2020-10-26 PROCEDURE — 71046 X-RAY EXAM CHEST 2 VIEWS: CPT

## 2020-10-28 ENCOUNTER — HOSPITAL ENCOUNTER (OUTPATIENT)
Dept: CT IMAGING | Age: 70
Discharge: HOME | End: 2020-10-28
Attending: NURSE PRACTITIONER
Payer: MEDICARE

## 2020-10-28 DIAGNOSIS — R91.1 LUNG NODULE: ICD-10-CM

## 2020-10-28 PROCEDURE — 71260 CT THORAX DX C+: CPT

## 2020-10-28 PROCEDURE — 82565 ASSAY OF CREATININE: CPT

## 2020-10-28 PROCEDURE — 74011000636 HC RX REV CODE- 636: Performed by: RADIOLOGY

## 2020-10-28 RX ADMIN — IOPAMIDOL 100 ML: 612 INJECTION, SOLUTION INTRAVENOUS at 13:47

## 2020-10-29 LAB — CREAT BLD-MCNC: 1.2 MG/DL (ref 0.6–1.3)

## 2020-11-02 ENCOUNTER — TELEPHONE (OUTPATIENT)
Dept: FAMILY MEDICINE CLINIC | Age: 70
End: 2020-11-02

## 2020-11-02 NOTE — TELEPHONE ENCOUNTER
Patient very irate that he was called before 4:00pm as he is driving.  Patient's phone: [phone]

## 2020-11-02 NOTE — TELEPHONE ENCOUNTER
----- Message from [place] Person sent at 11/2/2020 10:50 AM EST -----  Regarding: Test Results Question  Contact: [phone]  The CT shows nodules present and I need to send you to Pulmonary to do an assessment to see if we need to do anything about it. You also have emphysema. Do you have a pulmonologist that you have seen before? [facility]    I have known about the emphysema for years, so that is not a surprise.   I have never gone to see a pulmonologist.

## 2020-11-02 NOTE — TELEPHONE ENCOUNTER
Can you call him and given him Pulm info to call for an appt. Please help him get an appt if he has trouble getting in the next 2 weeks due to some abn pulm nodules on CT. Refer to Dion Vines or Mookie Ochoa.  [place]

## 2020-11-02 NOTE — PROGRESS NOTES
The CT shows nodules present and I need to send you to Pulmonary to do an assessment to see if we need to do anything about it. You also have emphysema. Do you have a pulmonologist that you have seen before?  Cecil Kaye

## 2020-11-03 ENCOUNTER — TELEPHONE (OUTPATIENT)
Dept: FAMILY MEDICINE CLINIC | Age: 70
End: 2020-11-03

## 2020-11-03 NOTE — TELEPHONE ENCOUNTER
Pt made appt with [facility] They need last OV, labs, xray and CT faxed to them at [phone]. He is seeing Dr. Michele Cardenas next Tuesday.

## 2020-11-04 NOTE — TELEPHONE ENCOUNTER
Faxed information  form to [facility]. @ [phone]. Confirmation number . Original form placed in scan folder for central scanning.

## 2020-11-10 DIAGNOSIS — F41.8 DEPRESSION WITH ANXIETY: ICD-10-CM

## 2020-11-10 RX ORDER — CITALOPRAM 40 MG/1
40 TABLET, FILM COATED ORAL DAILY
Qty: 30 TAB | Refills: 5 | Status: SHIPPED | OUTPATIENT
Start: 2020-11-10 | End: 2021-02-24

## 2020-12-09 ENCOUNTER — TRANSCRIBE ORDER (OUTPATIENT)
Dept: SCHEDULING | Age: 70
End: 2020-12-09

## 2020-12-09 DIAGNOSIS — R91.1 PULMONARY NODULE: Primary | ICD-10-CM

## 2021-02-02 ENCOUNTER — HOSPITAL ENCOUNTER (OUTPATIENT)
Dept: CT IMAGING | Age: 71
Discharge: HOME | End: 2021-02-02
Attending: INTERNAL MEDICINE
Payer: MEDICARE

## 2021-02-02 DIAGNOSIS — R91.1 PULMONARY NODULE: ICD-10-CM

## 2021-02-02 PROCEDURE — 71250 CT THORAX DX C-: CPT

## 2021-02-03 DIAGNOSIS — I10 ESSENTIAL HYPERTENSION: ICD-10-CM

## 2021-02-03 RX ORDER — LOSARTAN POTASSIUM AND HYDROCHLOROTHIAZIDE 12.5; 1 MG/1; MG/1
1 TABLET ORAL DAILY
Qty: 90 TAB | Refills: 3 | Status: SHIPPED | OUTPATIENT
Start: 2021-02-03 | End: 2021-08-04

## 2021-02-04 NOTE — TELEPHONE ENCOUNTER
Called and spoke with patient. Advised that he is due for his AMW visit. Patient verbalized understanding. Apt scheduled for 2/24/21.

## 2021-02-22 ENCOUNTER — TRANSCRIBE ORDER (OUTPATIENT)
Dept: SCHEDULING | Age: 71
End: 2021-02-22

## 2021-02-22 DIAGNOSIS — R91.1 PULMONARY NODULE, RIGHT: Primary | ICD-10-CM

## 2021-02-23 ENCOUNTER — TELEPHONE (OUTPATIENT)
Dept: FAMILY MEDICINE CLINIC | Age: 71
End: 2021-02-23

## 2021-02-23 NOTE — TELEPHONE ENCOUNTER
Called and spoke with patient. Advised to take his medications with water prior to his apt. Advised since thyroid medication states to take 30 minutes before breakfast he can hold off on taking that until before he eats breakfast. Patient verbalized understanding.

## 2021-02-23 NOTE — TELEPHONE ENCOUNTER
Patient would like to know if he should take his medication before his fasting appointment tomorrow. Patient said it can just be MyChart.  Patient's phone: [phone]

## 2021-02-24 ENCOUNTER — OFFICE VISIT (OUTPATIENT)
Dept: FAMILY MEDICINE CLINIC | Age: 71
End: 2021-02-24
Payer: MEDICARE

## 2021-02-24 VITALS
TEMPERATURE: 97.6 F | HEIGHT: 68 IN | BODY MASS INDEX: 22.13 KG/M2 | OXYGEN SATURATION: 96 % | DIASTOLIC BLOOD PRESSURE: 79 MMHG | WEIGHT: 146 LBS | SYSTOLIC BLOOD PRESSURE: 123 MMHG | RESPIRATION RATE: 18 BRPM | HEART RATE: 80 BPM

## 2021-02-24 DIAGNOSIS — Z87.891 PERSONAL HISTORY OF TOBACCO USE, PRESENTING HAZARDS TO HEALTH: ICD-10-CM

## 2021-02-24 DIAGNOSIS — Z12.5 SPECIAL SCREENING FOR MALIGNANT NEOPLASM OF PROSTATE: ICD-10-CM

## 2021-02-24 DIAGNOSIS — Z00.00 MEDICARE ANNUAL WELLNESS VISIT, SUBSEQUENT: ICD-10-CM

## 2021-02-24 DIAGNOSIS — Z13.6 SCREENING FOR CARDIOVASCULAR CONDITION: ICD-10-CM

## 2021-02-24 DIAGNOSIS — Z13.31 SCREENING FOR DEPRESSION: ICD-10-CM

## 2021-02-24 DIAGNOSIS — I10 ESSENTIAL HYPERTENSION: Primary | ICD-10-CM

## 2021-02-24 DIAGNOSIS — E07.9 THYROID DISEASE: ICD-10-CM

## 2021-02-24 DIAGNOSIS — F41.8 DEPRESSION WITH ANXIETY: ICD-10-CM

## 2021-02-24 LAB
ALBUMIN SERPL-MCNC: 4.5 G/DL (ref 3.5–5)
ALBUMIN/GLOB SERPL: 1.9 {RATIO} (ref 1.1–2.2)
ALP SERPL-CCNC: 58 U/L (ref 45–117)
ALT SERPL-CCNC: 21 U/L (ref 12–78)
ANION GAP SERPL CALC-SCNC: 5 MMOL/L (ref 5–15)
AST SERPL-CCNC: 18 U/L (ref 15–37)
BILIRUB SERPL-MCNC: 0.4 MG/DL (ref 0.2–1)
BUN SERPL-MCNC: 20 MG/DL (ref 6–20)
BUN/CREAT SERPL: 15 (ref 12–20)
CALCIUM SERPL-MCNC: 9.9 MG/DL (ref 8.5–10.1)
CHLORIDE SERPL-SCNC: 96 MMOL/L (ref 97–108)
CO2 SERPL-SCNC: 30 MMOL/L (ref 21–32)
CREAT SERPL-MCNC: 1.33 MG/DL (ref 0.7–1.3)
ERYTHROCYTE [DISTWIDTH] IN BLOOD BY AUTOMATED COUNT: 11.9 % (ref 11.5–14.5)
GLOBULIN SER CALC-MCNC: 2.4 G/DL (ref 2–4)
GLUCOSE SERPL-MCNC: 84 MG/DL (ref 65–100)
HCT VFR BLD AUTO: 44.9 % (ref 36.6–50.3)
HGB BLD-MCNC: 15.5 G/DL (ref 12.1–17)
MCH RBC QN AUTO: 34.6 PG (ref 26–34)
MCHC RBC AUTO-ENTMCNC: 34.5 G/DL (ref 30–36.5)
MCV RBC AUTO: 100.2 FL (ref 80–99)
NRBC # BLD: 0 K/UL (ref 0–0.01)
NRBC BLD-RTO: 0 PER 100 WBC
PLATELET # BLD AUTO: 276 K/UL (ref 150–400)
PMV BLD AUTO: 11.8 FL (ref 8.9–12.9)
POTASSIUM SERPL-SCNC: 4.5 MMOL/L (ref 3.5–5.1)
PROT SERPL-MCNC: 6.9 G/DL (ref 6.4–8.2)
PSA SERPL-MCNC: 3.1 NG/ML (ref 0.01–4)
RBC # BLD AUTO: 4.48 M/UL (ref 4.1–5.7)
SODIUM SERPL-SCNC: 131 MMOL/L (ref 136–145)
TSH SERPL DL<=0.05 MIU/L-ACNC: 2.48 UIU/ML (ref 0.36–3.74)
WBC # BLD AUTO: 6.4 K/UL (ref 4.1–11.1)

## 2021-02-24 PROCEDURE — 99214 OFFICE O/P EST MOD 30 MIN: CPT | Performed by: FAMILY MEDICINE

## 2021-02-24 PROCEDURE — G8536 NO DOC ELDER MAL SCRN: HCPCS | Performed by: FAMILY MEDICINE

## 2021-02-24 PROCEDURE — G8510 SCR DEP NEG, NO PLAN REQD: HCPCS | Performed by: FAMILY MEDICINE

## 2021-02-24 PROCEDURE — G0439 PPPS, SUBSEQ VISIT: HCPCS | Performed by: FAMILY MEDICINE

## 2021-02-24 PROCEDURE — G8420 CALC BMI NORM PARAMETERS: HCPCS | Performed by: FAMILY MEDICINE

## 2021-02-24 PROCEDURE — G8754 DIAS BP LESS 90: HCPCS | Performed by: FAMILY MEDICINE

## 2021-02-24 PROCEDURE — 3017F COLORECTAL CA SCREEN DOC REV: CPT | Performed by: FAMILY MEDICINE

## 2021-02-24 PROCEDURE — G8752 SYS BP LESS 140: HCPCS | Performed by: FAMILY MEDICINE

## 2021-02-24 PROCEDURE — 1101F PT FALLS ASSESS-DOCD LE1/YR: CPT | Performed by: FAMILY MEDICINE

## 2021-02-24 PROCEDURE — G8427 DOCREV CUR MEDS BY ELIG CLIN: HCPCS | Performed by: FAMILY MEDICINE

## 2021-02-24 PROCEDURE — G0463 HOSPITAL OUTPT CLINIC VISIT: HCPCS | Performed by: FAMILY MEDICINE

## 2021-02-24 RX ORDER — CITALOPRAM 20 MG/1
20 TABLET, FILM COATED ORAL DAILY
Qty: 30 TAB | Refills: 1 | Status: SHIPPED | OUTPATIENT
Start: 2021-02-24 | End: 2021-08-04

## 2021-02-24 RX ORDER — CITALOPRAM 10 MG/1
10 TABLET ORAL DAILY
Qty: 30 TAB | Refills: 1 | Status: SHIPPED | OUTPATIENT
Start: 2021-02-24 | End: 2021-08-04

## 2021-02-24 NOTE — PROGRESS NOTES
Progress Note    he is a 69y.o. year old male who presents for evalution. Subjective:     Pt would lie to come off celexa states he feels like he is in better place and would like to start to taper off. Will start with alternating 30 and 40 every other day then to 30 daily. He will contact me in a month for further taper if doing well. BP remains well controlled on medication, tolerates medication with no side effects. Pt also with gerd and this has been well controlled on protonix. Pt also with hypothyroid on levoxyl 175mcg. Pt is doing well with this. Reviewed PmHx, RxHx, FmHx, SocHx, AllgHx and updated and dated in the chart. Review of Systems - negative except as listed above in the HPI    Objective:     Vitals:    02/24/21 0839 02/24/21 0915   BP: (!) 139/94 123/79   Pulse: 80    Resp: 18    Temp: 97.6 °F (36.4 °C)    TempSrc: Oral    SpO2: 96%    Weight: 146 lb (66.2 kg)    Height: 5' 8\" (1.727 m)        Current Outpatient Medications   Medication Sig    citalopram (CELEXA) 20 mg tablet Take 1 Tab by mouth daily.  citalopram (CELEXA) 10 mg tablet Take 1 Tab by mouth daily. With 20mg    losartan-hydroCHLOROthiazide (HYZAAR) 100-12.5 mg per tablet Take 1 Tab by mouth daily.  multivitamin (ONE A DAY) tablet Take 1 Tab by mouth daily.  azelastine (ASTEPRO) 0.15 % (205.5 mcg) 2 Sprays by Both Nostrils route two (2) times a day.  ibuprofen (MOTRIN) 800 mg tablet Take 1 Tab by mouth every eight (8) hours as needed for Pain.  pantoprazole (PROTONIX) 40 mg tablet Take 40 mg by mouth daily.  famotidine (PEPCID) 10 mg tablet Take 10 mg by mouth daily.  levothyroxine (LEVOXYL) 175 mcg tablet Take 175 mcg by mouth Daily (before breakfast).  montelukast (SINGULAIR) 10 mg tablet Take 1 Tab by mouth daily.  ondansetron (ZOFRAN ODT) 8 mg disintegrating tablet Take 1 Tab by mouth every eight (8) hours as needed for Nausea.     ferrous sulfate 325 mg (65 mg iron) tablet 1 tab PO bid with food     No current facility-administered medications for this visit. Physical Examination: General appearance - alert, well appearing, and in no distress  Mental status - alert, oriented to person, place, and time  Neck - supple, no significant adenopathy  Lymphatics - no palpable lymphadenopathy, no hepatosplenomegaly  Chest - clear to auscultation, no wheezes, rales or rhonchi, symmetric air entry  Heart - normal rate, regular rhythm, normal S1, S2, no murmurs, rubs, clicks or gallops  Abdomen - soft, nontender, nondistended, no masses or organomegaly  Neurological - alert, oriented, normal speech, no focal findings or movement disorder noted  Musculoskeletal - no joint tenderness, deformity or swelling  Extremities - peripheral pulses normal, no pedal edema, no clubbing or cyanosis      Assessment/ Plan:   Diagnoses and all orders for this visit:    1. Essential hypertension  -     METABOLIC PANEL, COMPREHENSIVE; Future  -     CBC W/O DIFF; Future    2. Depression with anxiety  -     citalopram (CELEXA) 20 mg tablet; Take 1 Tab by mouth daily. -     citalopram (CELEXA) 10 mg tablet; Take 1 Tab by mouth daily. With 20mg  -     CBC W/O DIFF; Future    3. Thyroid disease  -     TSH 3RD GENERATION; Future  -     CBC W/O DIFF; Future    4. Medicare annual wellness visit, subsequent  -     CBC W/O DIFF; Future    5. Screening for depression  -     DEPRESSION SCREEN ANNUAL    6. Special screening for malignant neoplasm of prostate  -     PSA SCREENING (SCREENING); Future    7. Personal history of tobacco use, presenting hazards to health  -     Cynthia Anglin [address] AAA; Future    8. Screening for cardiovascular condition  -     US EXAM SCREENING AAA; Future       Follow-up and Dispositions    · Return if symptoms worsen or fail to improve. I have discussed the diagnosis with the patient and the intended plan as seen in the above orders.   The patient has received an after-visit summary and questions were answered concerning future plans. Pt conveyed understanding of plan. Medication Side Effects and Warnings were discussed with patient    An electronic signature was used to authenticate this note  Steffen Harrison, DO    This is the Subsequent Medicare Annual Wellness Exam, performed 12 months or more after the Initial AWV or the last Subsequent AWV    I have reviewed the patient's medical history in detail and updated the computerized patient record. Depression Risk Factor Screening:     3 most recent PHQ Screens 2/24/2021   PHQ Not Done -   Little interest or pleasure in doing things Not at all   Feeling down, depressed, irritable, or hopeless Not at all   Total Score PHQ 2 0   Trouble falling or staying asleep, or sleeping too much -   Feeling tired or having little energy -   Poor appetite, weight loss, or overeating -   Feeling bad about yourself - or that you are a failure or have let yourself or your family down -   Trouble concentrating on things such as school, work, reading, or watching TV -   Moving or speaking so slowly that other people could have noticed; or the opposite being so fidgety that others notice -   Thoughts of being better off dead, or hurting yourself in some way -   PHQ 9 Score -   How difficult have these problems made it for you to do your work, take care of your home and get along with others -       Alcohol Risk Screen    Do you average more than 1 drink per night or more than 7 drinks a week: 4 glasses of wine    In the past three months have you have had more than 4 drinks containing alcohol on one occasion: No        Functional Ability and Level of Safety:    Hearing: Hearing is good. Activities of Daily Living: The home contains: no safety equipment. Patient does total self care      Ambulation: with no difficulty     Fall Risk:  Fall Risk Assessment, last 12 mths 1/15/2020   Able to walk? Yes   Fall in past 12 months?  No   Number of falls in past 12 months -   Fall with injury? -      Abuse Screen:  Patient is not abused       Cognitive Screening    Has your family/caregiver stated any concerns about your memory: no         Assessment/Plan   Education and counseling provided:  Are appropriate based on today's review and evaluation  Prostate cancer screening tests (PSA, covered annually)    Diagnoses and all orders for this visit:    1. Essential hypertension  -     METABOLIC PANEL, COMPREHENSIVE; Future  -     CBC W/O DIFF; Future    2. Depression with anxiety  -     citalopram (CELEXA) 20 mg tablet; Take 1 Tab by mouth daily. -     citalopram (CELEXA) 10 mg tablet; Take 1 Tab by mouth daily. With 20mg  -     CBC W/O DIFF; Future    3. Thyroid disease  -     TSH 3RD GENERATION; Future  -     CBC W/O DIFF; Future    4. Medicare annual wellness visit, subsequent  -     CBC W/O DIFF; Future    5. Screening for depression  -     DEPRESSION SCREEN ANNUAL    6. Special screening for malignant neoplasm of prostate  -     PSA SCREENING (SCREENING); Future    7. Personal history of tobacco use, presenting hazards to health  -     Cynthia Anglin [address] AAA; Future    8. Screening for cardiovascular condition  -     US EXAM SCREENING AAA;  Future        Health Maintenance Due     Health Maintenance Due   Topic Date Due    COVID-19 Vaccine (1 of 2) 08/31/1965    Shingrix Vaccine Age 50> (1 of 2) 08/31/1999    AAA Screening 73-69 YO Male Smoking Patients  08/31/2014       Patient Care Team   Patient Care Team:  Elinor Bar DO as PCP - General (Family Medicine)  Elinor Bar DO as PCP - Shania Kenyon Provider    History     Patient Active Problem List   Diagnosis Code    Thyroid disease E07.9    Hypertension I10     Past Medical History:   Diagnosis Date    GERD (gastroesophageal reflux disease)     Hypertension     Thyroid disease       Past Surgical History:   Procedure Laterality Date    HX LIPOMA RESECTION      left shoulder    HX ORTHOPAEDIC      left shoulder     Current Outpatient Medications   Medication Sig Dispense Refill    citalopram (CELEXA) 20 mg tablet Take 1 Tab by mouth daily. 30 Tab 1    citalopram (CELEXA) 10 mg tablet Take 1 Tab by mouth daily. With 20mg 30 Tab 1    losartan-hydroCHLOROthiazide (HYZAAR) 100-12.5 mg per tablet Take 1 Tab by mouth daily. 90 Tab 3    multivitamin (ONE A DAY) tablet Take 1 Tab by mouth daily.  azelastine (ASTEPRO) 0.15 % (205.5 mcg) 2 Sprays by Both Nostrils route two (2) times a day. 1 Bottle 11    ibuprofen (MOTRIN) 800 mg tablet Take 1 Tab by mouth every eight (8) hours as needed for Pain. 60 Tab 2    pantoprazole (PROTONIX) 40 mg tablet Take 40 mg by mouth daily.  famotidine (PEPCID) 10 mg tablet Take 10 mg by mouth daily.  levothyroxine (LEVOXYL) 175 mcg tablet Take 175 mcg by mouth Daily (before breakfast).  montelukast (SINGULAIR) 10 mg tablet Take 1 Tab by mouth daily. 90 Tab 3    ondansetron (ZOFRAN ODT) 8 mg disintegrating tablet Take 1 Tab by mouth every eight (8) hours as needed for Nausea. 20 Tab 0    ferrous sulfate 325 mg (65 mg iron) tablet 1 tab PO bid with food 60 Tab 2     No Known Allergies    Family History   Problem Relation Age of Onset    Heart Disease Mother     Heart Attack Father      Social History     Tobacco Use    Smoking status: Former Smoker     Packs/day: 1.00     Years: 10.00     Pack years: 10.00     Quit date:      Years since quittin.1    Smokeless tobacco: Never Used   Substance Use Topics    Alcohol use: Yes   I have discussed the diagnosis with the patient and the intended plan as seen in the above orders. The patient has received an after-visit summary and questions were answered concerning future plans. Pt conveyed understanding of plan.     An electronic signature was used to authenticate this note  Dr Juanita Pugh

## 2021-02-24 NOTE — PROGRESS NOTES
Chief Complaint   Patient presents with    Annual Wellness Visit    Labs     Patient presents in office today for AMW and fasting labs. No concerns. 1. Have you been to the ER, urgent care clinic since your last visit? Hospitalized since your last visit? No    2. Have you seen or consulted any other health care providers outside of the [address] [place] [address] since your last visit? Include any pap smears or colon screening.  No    Learning Assessment 2/1/2018   PRIMARY LEARNER Patient   HIGHEST LEVEL OF EDUCATION - PRIMARY LEARNER  GRADUATED HIGH SCHOOL OR GED   BARRIERS PRIMARY LEARNER NONE   CO-LEARNER CAREGIVER No   PRIMARY LANGUAGE ENGLISH   LEARNER PREFERENCE PRIMARY DEMONSTRATION   ANSWERED BY patient   RELATIONSHIP SELF

## 2021-02-24 NOTE — PATIENT INSTRUCTIONS
Medicare Wellness Visit, Male The best way to live healthy is to have a lifestyle where you eat a well-balanced diet, exercise regularly, limit alcohol use, and quit all forms of tobacco/nicotine, if applicable. Regular preventive services are another way to keep healthy. Preventive services (vaccines, screening tests, monitoring & exams) can help personalize your care plan, which helps you manage your own care. Screening tests can find health problems at the earliest stages, when they are easiest to treat. [facility] follows the current, evidence-based guidelines published by the [facility] ([facility]) when recommending preventive services for our patients. Because we follow these guidelines, sometimes recommendations change over time as research supports it. (For example, a prostate screening blood test is no longer routinely recommended for men with no symptoms). Of course, you and your doctor may decide to screen more often for some diseases, based on your risk and co-morbidities (chronic disease you are already diagnosed with). Preventive services for you include: - Medicare offers their members a free annual wellness visit, which is time for you and your primary care provider to discuss and plan for your preventive service needs. Take advantage of this benefit every year! 
-All adults over age 72 should receive the recommended pneumonia vaccines. Current USPSTF guidelines recommend a series of two vaccines for the best pneumonia protection.  
-All adults should have a flu vaccine yearly and tetanus vaccine every 10 years. 
-All adults age 48 and older should receive the shingles vaccines (series of two vaccines). -All adults age 38-68 who are overweight should have a diabetes screening test once every three years. -Other screening tests & preventive services for persons with diabetes include: an eye exam to screen for diabetic retinopathy, a kidney function test, a foot exam, and stricter control over your cholesterol.  
-Cardiovascular screening for adults with routine risk involves an electrocardiogram (ECG) at intervals determined by the provider.  
-Colorectal cancer screening should be done for adults age 54-65 with no increased risk factors for colorectal cancer. There are a number of acceptable methods of screening for this type of cancer. Each test has its own benefits and drawbacks. Discuss with your provider what is most appropriate for you during your annual wellness visit. The different tests include: colonoscopy (considered the best screening method), a fecal occult blood test, a fecal DNA test, and sigmoidoscopy. 
-All adults born between [facility] should be screened once for Hepatitis C. 
-An Abdominal Aortic Aneurysm (AAA) Screening is recommended for men age 73-68 who has ever smoked in their lifetime. Here is a list of your current Health Maintenance items (your personalized list of preventive services) with a due date: 
Health Maintenance Due Topic Date Due  
 COVID-19 Vaccine (1 of 2) 08/31/1965  Shingles Vaccine (1 of 2) 08/31/1999  AAA Screening  08/31/2014

## 2021-02-25 DIAGNOSIS — E87.1 HYPONATREMIA: Primary | ICD-10-CM

## 2021-02-25 NOTE — PROGRESS NOTES
All of your labs look good except for your sodium level is low. I had like you to increase the sodium in your diet and have this rechecked in 2 weeks. Please call the office to schedule a lab only visit.

## 2021-03-01 ENCOUNTER — HOSPITAL ENCOUNTER (OUTPATIENT)
Dept: ULTRASOUND IMAGING | Age: 71
Discharge: HOME | End: 2021-03-01
Attending: FAMILY MEDICINE
Payer: MEDICARE

## 2021-03-01 DIAGNOSIS — Z13.6 SCREENING FOR CARDIOVASCULAR CONDITION: ICD-10-CM

## 2021-03-01 DIAGNOSIS — Z87.891 PERSONAL HISTORY OF TOBACCO USE, PRESENTING HAZARDS TO HEALTH: ICD-10-CM

## 2021-03-01 PROCEDURE — 76706 US ABDL AORTA SCREEN AAA: CPT

## 2021-03-09 ENCOUNTER — DOCUMENTATION ONLY (OUTPATIENT)
Dept: FAMILY MEDICINE CLINIC | Age: 71
End: 2021-03-09

## 2021-03-09 NOTE — PROGRESS NOTES
Faxed 02/24/21 lab results to Dr Davy Villela per [facility] request. Fax #[phone] confirmation received.

## 2021-04-22 LAB — SODIUM SERPL-SCNC: 133 MMOL/L (ref 136–145)

## 2021-08-04 ENCOUNTER — OFFICE VISIT (OUTPATIENT)
Dept: FAMILY MEDICINE CLINIC | Age: 71
End: 2021-08-04
Payer: MEDICARE

## 2021-08-04 VITALS
RESPIRATION RATE: 18 BRPM | TEMPERATURE: 97.9 F | BODY MASS INDEX: 22.43 KG/M2 | OXYGEN SATURATION: 97 % | SYSTOLIC BLOOD PRESSURE: 133 MMHG | HEART RATE: 66 BPM | DIASTOLIC BLOOD PRESSURE: 84 MMHG | WEIGHT: 148 LBS | HEIGHT: 68 IN

## 2021-08-04 DIAGNOSIS — E07.9 THYROID DISEASE: ICD-10-CM

## 2021-08-04 DIAGNOSIS — I10 ESSENTIAL HYPERTENSION: Primary | ICD-10-CM

## 2021-08-04 DIAGNOSIS — E87.1 HYPONATREMIA: ICD-10-CM

## 2021-08-04 LAB
ANION GAP SERPL CALC-SCNC: 4 MMOL/L (ref 5–15)
BUN SERPL-MCNC: 18 MG/DL (ref 6–20)
BUN/CREAT SERPL: 14 (ref 12–20)
CALCIUM SERPL-MCNC: 9.8 MG/DL (ref 8.5–10.1)
CHLORIDE SERPL-SCNC: 97 MMOL/L (ref 97–108)
CO2 SERPL-SCNC: 32 MMOL/L (ref 21–32)
CREAT SERPL-MCNC: 1.29 MG/DL (ref 0.7–1.3)
GLUCOSE SERPL-MCNC: 77 MG/DL (ref 65–100)
POTASSIUM SERPL-SCNC: 4.6 MMOL/L (ref 3.5–5.1)
SODIUM SERPL-SCNC: 133 MMOL/L (ref 136–145)
T4 FREE SERPL-MCNC: 1.3 NG/DL (ref 0.8–1.5)
TSH SERPL DL<=0.05 MIU/L-ACNC: 1.64 UIU/ML (ref 0.36–3.74)

## 2021-08-04 PROCEDURE — 99214 OFFICE O/P EST MOD 30 MIN: CPT | Performed by: FAMILY MEDICINE

## 2021-08-04 PROCEDURE — G8420 CALC BMI NORM PARAMETERS: HCPCS | Performed by: FAMILY MEDICINE

## 2021-08-04 PROCEDURE — G8427 DOCREV CUR MEDS BY ELIG CLIN: HCPCS | Performed by: FAMILY MEDICINE

## 2021-08-04 PROCEDURE — G8752 SYS BP LESS 140: HCPCS | Performed by: FAMILY MEDICINE

## 2021-08-04 PROCEDURE — 1101F PT FALLS ASSESS-DOCD LE1/YR: CPT | Performed by: FAMILY MEDICINE

## 2021-08-04 PROCEDURE — 3017F COLORECTAL CA SCREEN DOC REV: CPT | Performed by: FAMILY MEDICINE

## 2021-08-04 PROCEDURE — G8754 DIAS BP LESS 90: HCPCS | Performed by: FAMILY MEDICINE

## 2021-08-04 PROCEDURE — G8510 SCR DEP NEG, NO PLAN REQD: HCPCS | Performed by: FAMILY MEDICINE

## 2021-08-04 PROCEDURE — G8536 NO DOC ELDER MAL SCRN: HCPCS | Performed by: FAMILY MEDICINE

## 2021-08-04 PROCEDURE — G0463 HOSPITAL OUTPT CLINIC VISIT: HCPCS | Performed by: FAMILY MEDICINE

## 2021-08-04 RX ORDER — LOSARTAN POTASSIUM 100 MG/1
100 TABLET ORAL DAILY
Qty: 90 TABLET | Refills: 1 | Status: SHIPPED | OUTPATIENT
Start: 2021-08-04 | End: 2022-02-08 | Stop reason: SDUPTHER

## 2021-08-04 NOTE — PROGRESS NOTES
Chief Complaint   Patient presents with    Hypotension     Patient presents in office today for low BP. States that it started in June after weaning himself off of his antidepressant. His BP has been in the 90s-110s/60s-70s. States that he feels dizzy when standing up or turning his head quickly. No other concerns. 1. Have you been to the ER, urgent care clinic since your last visit? Hospitalized since your last visit? No    2. Have you seen or consulted any other health care providers outside of the [address] [place] [address] since your last visit? Include any pap smears or colon screening.  No    Learning Assessment 2/1/2018   PRIMARY LEARNER Patient   HIGHEST LEVEL OF EDUCATION - PRIMARY LEARNER  GRADUATED HIGH SCHOOL OR GED   BARRIERS PRIMARY LEARNER NONE   CO-LEARNER CAREGIVER No   PRIMARY LANGUAGE ENGLISH   LEARNER PREFERENCE PRIMARY DEMONSTRATION   ANSWERED BY patient   RELATIONSHIP SELF

## 2021-08-04 NOTE — PROGRESS NOTES
Progress Note    he is a 69y.o. year old male who presents for evalution. Subjective:     Pt states he weaned off of the celexa and states since then his BP has been running low. He has a chart of his BP and is running 90's-1teens/60's-80's. Currently on losartan hctz 100/12. 5. He has been exercising more and lost some weight per patient    Na was low but improved to 133. Will recheck today. He has pulm nodule and has this scheduled for 8/17. He also has f/u with pulm. Reviewed PmHx, RxHx, FmHx, SocHx, AllgHx and updated and dated in the chart. Review of Systems - negative except as listed above in the HPI    Objective:     Vitals:    08/04/21 0723 08/04/21 0753   BP: (!) 145/87 133/84   Pulse: 66    Resp: 18    Temp: 97.9 °F (36.6 °C)    TempSrc: Oral    SpO2: 97%    Weight: 148 lb (67.1 kg)    Height: 5' 8\" (1.727 m)        Current Outpatient Medications   Medication Sig    losartan (COZAAR) 100 mg tablet Take 1 Tablet by mouth daily.  multivitamin (ONE A DAY) tablet Take 1 Tab by mouth daily.  ibuprofen (MOTRIN) 800 mg tablet Take 1 Tab by mouth every eight (8) hours as needed for Pain.  pantoprazole (PROTONIX) 40 mg tablet Take 40 mg by mouth daily.  levothyroxine (LEVOXYL) 175 mcg tablet Take 175 mcg by mouth Daily (before breakfast).  azelastine (ASTEPRO) 0.15 % (205.5 mcg) 2 Sprays by Both Nostrils route two (2) times a day. (Patient not taking: Reported on 8/4/2021)    montelukast (SINGULAIR) 10 mg tablet Take 1 Tab by mouth daily. (Patient not taking: Reported on 8/4/2021)    famotidine (PEPCID) 10 mg tablet Take 10 mg by mouth daily. (Patient not taking: Reported on 8/4/2021)    ondansetron (ZOFRAN ODT) 8 mg disintegrating tablet Take 1 Tab by mouth every eight (8) hours as needed for Nausea.  (Patient not taking: Reported on 8/4/2021)    ferrous sulfate 325 mg (65 mg iron) tablet 1 tab PO bid with food (Patient not taking: Reported on 8/4/2021)     No current facility-administered medications for this visit. Physical Examination: General appearance - alert, well appearing, and in no distress  Mental status - alert, oriented to person, place, and time  Chest - clear to auscultation, no wheezes, rales or rhonchi, symmetric air entry  Heart - normal rate, regular rhythm, normal S1, S2, no murmurs, rubs, clicks or gallops      Assessment/ Plan:   Diagnoses and all orders for this visit:    1. Essential hypertension  -     losartan (COZAAR) 100 mg tablet; Take 1 Tablet by mouth daily. Take out the hydrochlorothiazide. He will continue to track his blood pressure and follow-up in 1 month. 2. Thyroid disease  -     TSH 3RD GENERATION; Future  -     T4, FREE; Future    3. Hyponatremia  -     METABOLIC PANEL, BASIC; Future       Follow-up and Dispositions    · Return in about 4 weeks (around 9/1/2021), or if symptoms worsen or fail to improve. I have discussed the diagnosis with the patient and the intended plan as seen in the above orders. The patient has received an after-visit summary and questions were answered concerning future plans. Pt conveyed understanding of plan.     Medication Side Effects and Warnings were discussed with patient    An electronic signature was used to authenticate this note  Guillermo Rodrigues DO

## 2021-08-04 NOTE — PATIENT INSTRUCTIONS
A Healthy Lifestyle: Care Instructions  Your Care Instructions     A healthy lifestyle can help you feel good, stay at a healthy weight, and have plenty of energy for both work and play. A healthy lifestyle is something you can share with your whole family. A healthy lifestyle also can lower your risk for serious health problems, such as high blood pressure, heart disease, and diabetes. You can follow a few steps listed below to improve your health and the health of your family. Follow-up care is a key part of your treatment and safety. Be sure to make and go to all appointments, and call your doctor if you are having problems. It's also a good idea to know your test results and keep a list of the medicines you take. How can you care for yourself at home? · Do not eat too much sugar, fat, or fast foods. You can still have dessert and treats now and then. The goal is moderation. · Start small to improve your eating habits. Pay attention to portion sizes, drink less juice and soda pop, and eat more fruits and vegetables. ? Eat a healthy amount of food. A 3-ounce serving of meat, for example, is about the size of a deck of cards. Fill the rest of your plate with vegetables and whole grains. ? Limit the amount of soda and sports drinks you have every day. Drink more water when you are thirsty. ? Eat plenty of fruits and vegetables every day. Have an apple or some carrot sticks as an afternoon snack instead of a candy bar. Try to have fruits and/or vegetables at every meal.  · Make exercise part of your daily routine. You may want to start with simple activities, such as walking, bicycling, or slow swimming. Try to be active 30 to 60 minutes every day. You do not need to do all 30 to 60 minutes all at once. For example, you can exercise 3 times a day for 10 or 20 minutes.  Moderate exercise is safe for most people, but it is always a good idea to talk to your doctor before starting an exercise program.  · Keep moving. [place] the lawn, work in the garden, or [place]. Take the stairs instead of the elevator at work. · If you smoke, quit. People who smoke have an increased risk for heart attack, stroke, cancer, and other lung illnesses. Quitting is hard, but there are ways to boost your chance of quitting tobacco for good. ? Use nicotine gum, patches, or lozenges. ? Ask your doctor about stop-smoking programs and medicines. ? Keep trying. In addition to reducing your risk of diseases in the future, you will notice some benefits soon after you stop using tobacco. If you have shortness of breath or asthma symptoms, they will likely get better within a few weeks after you quit. · Limit how much alcohol you drink. Moderate amounts of alcohol (up to 2 drinks a day for men, 1 drink a day for women) are okay. But drinking too much can lead to liver problems, high blood pressure, and other health problems. Family health  If you have a family, there are many things you can do together to improve your health. · Eat meals together as a family as often as possible. · Eat healthy foods. This includes fruits, vegetables, lean meats and dairy, and whole grains. · Include your family in your fitness plan. Most people think of activities such as jogging or tennis as the way to fitness, but there are many ways you and your family can be more active. Anything that makes you breathe hard and gets your heart pumping is exercise. Here are some tips:  ? Walk to do errands or to take your child to school or the bus.  ? Go for a family bike ride after dinner instead of watching TV. Where can you learn more? Go to http://www.gray.com/  Enter O538 in the search box to learn more about \"A Healthy Lifestyle: Care Instructions. \"  Current as of: September 23, 2020               Content Version: 12.8  © 6417-2515 Healthwise, Incorporated.    Care instructions adapted under license by [employer] (which disclaims liability or warranty for this information). If you have questions about a medical condition or this instruction, always ask your healthcare professional. [address] any warranty or liability for your use of this information.

## 2021-08-17 ENCOUNTER — HOSPITAL ENCOUNTER (OUTPATIENT)
Dept: CT IMAGING | Age: 71
Discharge: HOME | End: 2021-08-17
Attending: INTERNAL MEDICINE
Payer: MEDICARE

## 2021-08-17 DIAGNOSIS — R91.1 PULMONARY NODULE, RIGHT: ICD-10-CM

## 2021-08-17 PROCEDURE — 71250 CT THORAX DX C-: CPT

## 2021-08-24 ENCOUNTER — TRANSCRIBE ORDER (OUTPATIENT)
Dept: SCHEDULING | Age: 71
End: 2021-08-24

## 2021-08-24 DIAGNOSIS — R91.1 PULMONARY NODULE: Primary | ICD-10-CM

## 2021-08-26 ENCOUNTER — TRANSCRIBE ORDER (OUTPATIENT)
Dept: SCHEDULING | Age: 71
End: 2021-08-26

## 2021-08-26 DIAGNOSIS — J43.9 EMPHYSEMA/COPD (HCC): Primary | ICD-10-CM

## 2021-08-30 ENCOUNTER — HOSPITAL ENCOUNTER (OUTPATIENT)
Dept: PET IMAGING | Age: 71
Discharge: HOME | End: 2021-08-30
Payer: MEDICARE

## 2021-08-30 DIAGNOSIS — R91.1 PULMONARY NODULE: ICD-10-CM

## 2021-08-30 PROCEDURE — A9552 F18 FDG: HCPCS

## 2021-08-30 RX ORDER — FLUDEOXYGLUCOSE F-18 200 MCI/ML
15.44 INJECTION INTRAVENOUS ONCE
Status: COMPLETED | OUTPATIENT
Start: 2021-08-30 | End: 2021-08-30

## 2021-08-30 RX ADMIN — FLUDEOXYGLUCOSE F-18 15.44 MILLICURIE: 200 INJECTION INTRAVENOUS at 09:53

## 2021-09-08 ENCOUNTER — OFFICE VISIT (OUTPATIENT)
Dept: FAMILY MEDICINE CLINIC | Age: 71
End: 2021-09-08
Payer: MEDICARE

## 2021-09-08 VITALS
SYSTOLIC BLOOD PRESSURE: 156 MMHG | BODY MASS INDEX: 22.58 KG/M2 | DIASTOLIC BLOOD PRESSURE: 89 MMHG | WEIGHT: 149 LBS | OXYGEN SATURATION: 97 % | RESPIRATION RATE: 16 BRPM | HEART RATE: 68 BPM | TEMPERATURE: 97.7 F | HEIGHT: 68 IN

## 2021-09-08 DIAGNOSIS — M54.2 NECK PAIN: ICD-10-CM

## 2021-09-08 DIAGNOSIS — C34.11 MALIGNANT NEOPLASM OF UPPER LOBE OF RIGHT LUNG (HCC): Primary | ICD-10-CM

## 2021-09-08 DIAGNOSIS — C61 PROSTATE CANCER (HCC): ICD-10-CM

## 2021-09-08 DIAGNOSIS — I10 ESSENTIAL HYPERTENSION: ICD-10-CM

## 2021-09-08 DIAGNOSIS — J43.2 CENTRILOBULAR EMPHYSEMA (HCC): ICD-10-CM

## 2021-09-08 PROCEDURE — G0463 HOSPITAL OUTPT CLINIC VISIT: HCPCS | Performed by: FAMILY MEDICINE

## 2021-09-08 PROCEDURE — G8420 CALC BMI NORM PARAMETERS: HCPCS | Performed by: FAMILY MEDICINE

## 2021-09-08 PROCEDURE — G8510 SCR DEP NEG, NO PLAN REQD: HCPCS | Performed by: FAMILY MEDICINE

## 2021-09-08 PROCEDURE — G8753 SYS BP > OR = 140: HCPCS | Performed by: FAMILY MEDICINE

## 2021-09-08 PROCEDURE — 99214 OFFICE O/P EST MOD 30 MIN: CPT | Performed by: FAMILY MEDICINE

## 2021-09-08 PROCEDURE — G8427 DOCREV CUR MEDS BY ELIG CLIN: HCPCS | Performed by: FAMILY MEDICINE

## 2021-09-08 PROCEDURE — G8754 DIAS BP LESS 90: HCPCS | Performed by: FAMILY MEDICINE

## 2021-09-08 PROCEDURE — 1101F PT FALLS ASSESS-DOCD LE1/YR: CPT | Performed by: FAMILY MEDICINE

## 2021-09-08 PROCEDURE — G8536 NO DOC ELDER MAL SCRN: HCPCS | Performed by: FAMILY MEDICINE

## 2021-09-08 PROCEDURE — 3017F COLORECTAL CA SCREEN DOC REV: CPT | Performed by: FAMILY MEDICINE

## 2021-09-08 RX ORDER — IBUPROFEN 800 MG/1
800 TABLET ORAL
Qty: 60 TABLET | Refills: 5 | Status: SHIPPED | OUTPATIENT
Start: 2021-09-08 | End: 2021-11-11

## 2021-09-08 RX ORDER — ALBUTEROL SULFATE 90 UG/1
2 AEROSOL, METERED RESPIRATORY (INHALATION)
Qty: 18 G | Refills: 5 | Status: SHIPPED | OUTPATIENT
Start: 2021-09-08 | End: 2022-08-07 | Stop reason: SDUPTHER

## 2021-09-08 NOTE — PROGRESS NOTES
Chief Complaint   Patient presents with    Blood Pressure Check     Patient presents in office today for BP check. .  Would like to discuss PET scan that he had done. No other concerns. 1. Have you been to the ER, urgent care clinic since your last visit? Hospitalized since your last visit? No    2. Have you seen or consulted any other health care providers outside of the [address] [place] [address] since your last visit? Include any pap smears or colon screening.  No    Learning Assessment 2/1/2018   PRIMARY LEARNER Patient   HIGHEST LEVEL OF EDUCATION - PRIMARY LEARNER  GRADUATED HIGH SCHOOL OR GED   BARRIERS PRIMARY LEARNER NONE   CO-LEARNER CAREGIVER No   PRIMARY LANGUAGE ENGLISH   LEARNER PREFERENCE PRIMARY DEMONSTRATION   ANSWERED BY patient   RELATIONSHIP SELF

## 2021-09-08 NOTE — PATIENT INSTRUCTIONS
A Healthy Lifestyle: Care Instructions  Your Care Instructions     A healthy lifestyle can help you feel good, stay at a healthy weight, and have plenty of energy for both work and play. A healthy lifestyle is something you can share with your whole family. A healthy lifestyle also can lower your risk for serious health problems, such as high blood pressure, heart disease, and diabetes. You can follow a few steps listed below to improve your health and the health of your family. Follow-up care is a key part of your treatment and safety. Be sure to make and go to all appointments, and call your doctor if you are having problems. It's also a good idea to know your test results and keep a list of the medicines you take. How can you care for yourself at home? · Do not eat too much sugar, fat, or fast foods. You can still have dessert and treats now and then. The goal is moderation. · Start small to improve your eating habits. Pay attention to portion sizes, drink less juice and soda pop, and eat more fruits and vegetables. ? Eat a healthy amount of food. A 3-ounce serving of meat, for example, is about the size of a deck of cards. Fill the rest of your plate with vegetables and whole grains. ? Limit the amount of soda and sports drinks you have every day. Drink more water when you are thirsty. ? Eat plenty of fruits and vegetables every day. Have an apple or some carrot sticks as an afternoon snack instead of a candy bar. Try to have fruits and/or vegetables at every meal.  · Make exercise part of your daily routine. You may want to start with simple activities, such as walking, bicycling, or slow swimming. Try to be active 30 to 60 minutes every day. You do not need to do all 30 to 60 minutes all at once. For example, you can exercise 3 times a day for 10 or 20 minutes.  Moderate exercise is safe for most people, but it is always a good idea to talk to your doctor before starting an exercise program.  · Keep moving. Efrain Handy the lawn, work in the garden, or [place]. Take the stairs instead of the elevator at work. · If you smoke, quit. People who smoke have an increased risk for heart attack, stroke, cancer, and other lung illnesses. Quitting is hard, but there are ways to boost your chance of quitting tobacco for good. ? Use nicotine gum, patches, or lozenges. ? Ask your doctor about stop-smoking programs and medicines. ? Keep trying. In addition to reducing your risk of diseases in the future, you will notice some benefits soon after you stop using tobacco. If you have shortness of breath or asthma symptoms, they will likely get better within a few weeks after you quit. · Limit how much alcohol you drink. Moderate amounts of alcohol (up to 2 drinks a day for men, 1 drink a day for women) are okay. But drinking too much can lead to liver problems, high blood pressure, and other health problems. Family health  If you have a family, there are many things you can do together to improve your health. · Eat meals together as a family as often as possible. · Eat healthy foods. This includes fruits, vegetables, lean meats and dairy, and whole grains. · Include your family in your fitness plan. Most people think of activities such as jogging or tennis as the way to fitness, but there are many ways you and your family can be more active. Anything that makes you breathe hard and gets your heart pumping is exercise. Here are some tips:  ? Walk to do errands or to take your child to school or the bus.  ? Go for a family bike ride after dinner instead of watching TV. Where can you learn more? Go to http://www.gray.com/  Enter R041 in the search box to learn more about \"A Healthy Lifestyle: Care Instructions. \"  Current as of: September 23, 2020               Content Version: 12.8  © 9554-4375 Healthwise, Incorporated.    Care instructions adapted under license by [employer] (which disclaims liability or warranty for this information). If you have questions about a medical condition or this instruction, always ask your healthcare professional. [address] any warranty or liability for your use of this information.

## 2021-09-08 NOTE — PROGRESS NOTES
Progress Note    he is a 66y.o. year old male who presents for evalution. Subjective:     Pt here for follow up on his BP. Has been running better at home. Does have new Dx of lung cancer, seeing pulm later today. Spot was seen in his prostate as well and will refer to urology. Images were shown to the patient from his PET scan. Has been feeling a bit more wheezy, will give inhaler. Had recent PFT has emphysema, no inhaler currently. No longer smokes. He is requesting refill of Motrin use as needed for neck pain  Reviewed PmHx, RxHx, FmHx, SocHx, AllgHx and updated and dated in the chart. Review of Systems - negative except as listed above in the HPI    Objective:     Vitals:    09/08/21 0726 09/08/21 0758   BP: (!) 164/100 (!) 156/89   Pulse: 68    Resp: 16    Temp: 97.7 °F (36.5 °C)    TempSrc: Oral    SpO2: 97%    Weight: 149 lb (67.6 kg)    Height: 5' 8\" (1.727 m)        Current Outpatient Medications   Medication Sig    albuterol (PROVENTIL HFA, VENTOLIN HFA, PROAIR HFA) 90 mcg/actuation inhaler Take 2 Puffs by inhalation every four (4) hours as needed for Wheezing.  ibuprofen (MOTRIN) 800 mg tablet Take 1 Tablet by mouth every eight (8) hours as needed for Pain.  losartan (COZAAR) 100 mg tablet Take 1 Tablet by mouth daily.  multivitamin (ONE A DAY) tablet Take 1 Tab by mouth daily.  pantoprazole (PROTONIX) 40 mg tablet Take 40 mg by mouth daily.  levothyroxine (LEVOXYL) 175 mcg tablet Take 175 mcg by mouth Daily (before breakfast).  azelastine (ASTEPRO) 0.15 % (205.5 mcg) 2 Sprays by Both Nostrils route two (2) times a day. (Patient not taking: Reported on 8/4/2021)    montelukast (SINGULAIR) 10 mg tablet Take 1 Tab by mouth daily. (Patient not taking: Reported on 8/4/2021)    famotidine (PEPCID) 10 mg tablet Take 10 mg by mouth daily.  (Patient not taking: Reported on 8/4/2021)    ondansetron (ZOFRAN ODT) 8 mg disintegrating tablet Take 1 Tab by mouth every eight (8) hours as needed for Nausea. (Patient not taking: Reported on 8/4/2021)    ferrous sulfate 325 mg (65 mg iron) tablet 1 tab PO bid with food (Patient not taking: Reported on 8/4/2021)     No current facility-administered medications for this visit. Physical Examination: General appearance - alert, well appearing, and in no distress  Chest - wheezing noted in right upper lobe region  Heart - normal rate, regular rhythm, normal S1, S2, no murmurs, rubs, clicks or gallops      Assessment/ Plan:   Diagnoses and all orders for this visit:    1. Malignant neoplasm of upper lobe of right lung ([facility] [address]  Seeing pulmonary for follow-up today. 2. Prostate cancer ([facility] [address]  -     REFERRAL TO UROLOGY    3. Centrilobular emphysema (HCC)  -     albuterol (PROVENTIL HFA, VENTOLIN HFA, PROAIR HFA) 90 mcg/actuation inhaler; Take 2 Puffs by inhalation every four (4) hours as needed for Wheezing. 4. Essential hypertension  He is a little bit high today he did bring in logs for the past month and his blood pressure has for the most part been running normal.  He is also obviously under increased stress due to new cancer diagnosis. He will continue to monitor at home we will have him follow-up in 2 months for recheck. 5. Acute non-recurrent ethmoidal sinusitis  -     ibuprofen (MOTRIN) 800 mg tablet; Take 1 Tablet by mouth every eight (8) hours as needed for Pain. Follow-up and Dispositions    · Return in about 2 months (around 11/8/2021), or if symptoms worsen or fail to improve. I have discussed the diagnosis with the patient and the intended plan as seen in the above orders. The patient has received an after-visit summary and questions were answered concerning future plans. Pt conveyed understanding of plan.     Medication Side Effects and Warnings were discussed with patient    An electronic signature was used to authenticate this note  Gabbi Gibson DO

## 2021-10-15 ENCOUNTER — DOCUMENTATION ONLY (OUTPATIENT)
Dept: FAMILY MEDICINE CLINIC | Age: 71
End: 2021-10-15

## 2021-10-20 DIAGNOSIS — I26.09 OTHER ACUTE PULMONARY EMBOLISM WITH ACUTE COR PULMONALE (HCC): Primary | ICD-10-CM

## 2021-10-20 DIAGNOSIS — C34.91 ADENOCARCINOMA OF RIGHT LUNG (HCC): ICD-10-CM

## 2021-10-22 ENCOUNTER — DOCUMENTATION ONLY (OUTPATIENT)
Dept: FAMILY MEDICINE CLINIC | Age: 71
End: 2021-10-22

## 2021-11-04 ENCOUNTER — TELEPHONE (OUTPATIENT)
Dept: FAMILY MEDICINE CLINIC | Age: 71
End: 2021-11-04

## 2021-11-04 NOTE — TELEPHONE ENCOUNTER
----- Message from RUST (TURNER DIAZ) sent at 11/3/2021  2:13 PM EDT -----  Subject: Message to Provider    QUESTIONS  Information for Provider? pt would like a call back to discuss the   referral he has and he would like a call back today   ---------------------------------------------------------------------------  --------------  [address]  What is the best way for the office to contact you? OK to leave message on   voicemail  Preferred Call Back Phone Number? [phone]  ---------------------------------------------------------------------------  --------------  SCRIPT ANSWERS  Relationship to Patient?  Self

## 2021-11-11 ENCOUNTER — OFFICE VISIT (OUTPATIENT)
Dept: FAMILY MEDICINE CLINIC | Age: 71
End: 2021-11-11
Payer: MEDICARE

## 2021-11-11 ENCOUNTER — DOCUMENTATION ONLY (OUTPATIENT)
Dept: FAMILY MEDICINE CLINIC | Age: 71
End: 2021-11-11

## 2021-11-11 VITALS
DIASTOLIC BLOOD PRESSURE: 82 MMHG | HEART RATE: 68 BPM | SYSTOLIC BLOOD PRESSURE: 136 MMHG | TEMPERATURE: 98.3 F | BODY MASS INDEX: 21.37 KG/M2 | RESPIRATION RATE: 16 BRPM | OXYGEN SATURATION: 96 % | WEIGHT: 141 LBS | HEIGHT: 68 IN

## 2021-11-11 DIAGNOSIS — C34.11 MALIGNANT NEOPLASM OF UPPER LOBE OF RIGHT LUNG (HCC): ICD-10-CM

## 2021-11-11 DIAGNOSIS — R39.89 ABNORMAL PROSTATE EXAM: ICD-10-CM

## 2021-11-11 DIAGNOSIS — I26.09 OTHER ACUTE PULMONARY EMBOLISM WITH ACUTE COR PULMONALE (HCC): Primary | ICD-10-CM

## 2021-11-11 LAB
INR BLD: 2.5
PT POC: 30.3 SECONDS
VALID INTERNAL CONTROL?: YES

## 2021-11-11 PROCEDURE — G0463 HOSPITAL OUTPT CLINIC VISIT: HCPCS | Performed by: FAMILY MEDICINE

## 2021-11-11 PROCEDURE — G8432 DEP SCR NOT DOC, RNG: HCPCS | Performed by: FAMILY MEDICINE

## 2021-11-11 PROCEDURE — 85610 PROTHROMBIN TIME: CPT | Performed by: FAMILY MEDICINE

## 2021-11-11 PROCEDURE — G8536 NO DOC ELDER MAL SCRN: HCPCS | Performed by: FAMILY MEDICINE

## 2021-11-11 PROCEDURE — G8420 CALC BMI NORM PARAMETERS: HCPCS | Performed by: FAMILY MEDICINE

## 2021-11-11 PROCEDURE — 1101F PT FALLS ASSESS-DOCD LE1/YR: CPT | Performed by: FAMILY MEDICINE

## 2021-11-11 PROCEDURE — 99214 OFFICE O/P EST MOD 30 MIN: CPT | Performed by: FAMILY MEDICINE

## 2021-11-11 PROCEDURE — 3017F COLORECTAL CA SCREEN DOC REV: CPT | Performed by: FAMILY MEDICINE

## 2021-11-11 PROCEDURE — G8427 DOCREV CUR MEDS BY ELIG CLIN: HCPCS | Performed by: FAMILY MEDICINE

## 2021-11-11 PROCEDURE — G8752 SYS BP LESS 140: HCPCS | Performed by: FAMILY MEDICINE

## 2021-11-11 PROCEDURE — G8754 DIAS BP LESS 90: HCPCS | Performed by: FAMILY MEDICINE

## 2021-11-11 RX ORDER — WARFARIN SODIUM 5 MG/1
TABLET ORAL
Qty: 135 TABLET | Refills: 1 | Status: SHIPPED | OUTPATIENT
Start: 2021-11-11 | End: 2021-11-18

## 2021-11-11 RX ORDER — ACETAMINOPHEN 500 MG
TABLET ORAL
COMMUNITY
End: 2021-12-03

## 2021-11-11 RX ORDER — WARFARIN 7.5 MG/1
7.5 TABLET ORAL DAILY
COMMUNITY
End: 2021-11-11

## 2021-11-11 RX ORDER — WARFARIN SODIUM 5 MG/1
5 TABLET ORAL DAILY
COMMUNITY
End: 2021-11-11 | Stop reason: SDUPTHER

## 2021-11-11 NOTE — PATIENT INSTRUCTIONS
A Healthy Lifestyle: Care Instructions  Your Care Instructions     A healthy lifestyle can help you feel good, stay at a healthy weight, and have plenty of energy for both work and play. A healthy lifestyle is something you can share with your whole family. A healthy lifestyle also can lower your risk for serious health problems, such as high blood pressure, heart disease, and diabetes. You can follow a few steps listed below to improve your health and the health of your family. Follow-up care is a key part of your treatment and safety. Be sure to make and go to all appointments, and call your doctor if you are having problems. It's also a good idea to know your test results and keep a list of the medicines you take. How can you care for yourself at home? · Do not eat too much sugar, fat, or fast foods. You can still have dessert and treats now and then. The goal is moderation. · Start small to improve your eating habits. Pay attention to portion sizes, drink less juice and soda pop, and eat more fruits and vegetables. ? Eat a healthy amount of food. A 3-ounce serving of meat, for example, is about the size of a deck of cards. Fill the rest of your plate with vegetables and whole grains. ? Limit the amount of soda and sports drinks you have every day. Drink more water when you are thirsty. ? Eat plenty of fruits and vegetables every day. Have an apple or some carrot sticks as an afternoon snack instead of a candy bar. Try to have fruits and/or vegetables at every meal.  · Make exercise part of your daily routine. You may want to start with simple activities, such as walking, bicycling, or slow swimming. Try to be active 30 to 60 minutes every day. You do not need to do all 30 to 60 minutes all at once. For example, you can exercise 3 times a day for 10 or 20 minutes.  Moderate exercise is safe for most people, but it is always a good idea to talk to your doctor before starting an exercise program.  · Keep moving. Stephanie [place] the lawn, work in the garden, or [place]. Take the stairs instead of the elevator at work. · If you smoke, quit. People who smoke have an increased risk for heart attack, stroke, cancer, and other lung illnesses. Quitting is hard, but there are ways to boost your chance of quitting tobacco for good. ? Use nicotine gum, patches, or lozenges. ? Ask your doctor about stop-smoking programs and medicines. ? Keep trying. In addition to reducing your risk of diseases in the future, you will notice some benefits soon after you stop using tobacco. If you have shortness of breath or asthma symptoms, they will likely get better within a few weeks after you quit. · Limit how much alcohol you drink. Moderate amounts of alcohol (up to 2 drinks a day for men, 1 drink a day for women) are okay. But drinking too much can lead to liver problems, high blood pressure, and other health problems. Family health  If you have a family, there are many things you can do together to improve your health. · Eat meals together as a family as often as possible. · Eat healthy foods. This includes fruits, vegetables, lean meats and dairy, and whole grains. · Include your family in your fitness plan. Most people think of activities such as jogging or tennis as the way to fitness, but there are many ways you and your family can be more active. Anything that makes you breathe hard and gets your heart pumping is exercise. Here are some tips:  ? Walk to do errands or to take your child to school or the bus.  ? Go for a family bike ride after dinner instead of watching TV. Where can you learn more? Go to http://www.gray.com/  Enter I708 in the search box to learn more about \"A Healthy Lifestyle: Care Instructions. \"  Current as of: June 16, 2021               Content Version: 13.0  © 8916-0231 Healthwise, Incorporated.    Care instructions adapted under license by [employer] (which disclaims liability or warranty for this information). If you have questions about a medical condition or this instruction, always ask your healthcare professional. [address] any warranty or liability for your use of this information.

## 2021-11-11 NOTE — PROGRESS NOTES
Progress Note    he is a 66y.o. year old male who presents for evalution. Subjective:     Pt here for follow up from recent hospitalization for removal of lung cancer and ended up with a sig PE where he had a catheter directed TPA. IVC filter was placed as well. He was on xarelto and this was very expensive so was just switched to coumadin 5mg last Thursday INR ws low Monday changed to 7.5 and is due for recheck today. He does not want to go to [facility] and would prefer for me to manage this which I am agreeable to. He wants nothing to do with [facility] at this point so will refer to [facility] hematology. S/p resection of R lung with negative nodes. Upoper lobe. Unsure of his INR Monday. Pt has continued pain in his rib cage lower section from his surgery. He is off oxy from surgery. He does not want any narcotics aware no NSAID. Discussed tramadol prn but he will prefer to stick with tylenol 3g or less daily. He will be seeing urologist next week and will plan on MRI. Had HORACE from uro and did not feel anything. PSA has been in 3's. Reviewed PmHx, RxHx, FmHx, SocHx, AllgHx and updated and dated in the chart. Review of Systems - negative except as listed above in the HPI    Objective:     Vitals:    11/11/21 0851   BP: 136/82   Pulse: 68   Resp: 16   Temp: 98.3 °F (36.8 °C)   TempSrc: Oral   SpO2: 96%   Weight: 141 lb (64 kg)   Height: 5' 8\" (1.727 m)       Current Outpatient Medications   Medication Sig    acetaminophen (Tylenol Extra Strength) 500 mg tablet Take  by mouth every six (6) hours as needed for Pain.  warfarin (COUMADIN) 5 mg tablet Take 5mg one day 7.5 the next alternating    albuterol (PROVENTIL HFA, VENTOLIN HFA, PROAIR HFA) 90 mcg/actuation inhaler Take 2 Puffs by inhalation every four (4) hours as needed for Wheezing.  losartan (COZAAR) 100 mg tablet Take 1 Tablet by mouth daily.  multivitamin (ONE A DAY) tablet Take 1 Tab by mouth daily.     pantoprazole (PROTONIX) 40 mg tablet Take 40 mg by mouth daily.  levothyroxine (LEVOXYL) 175 mcg tablet Take 175 mcg by mouth Daily (before breakfast).  azelastine (ASTEPRO) 0.15 % (205.5 mcg) 2 Sprays by Both Nostrils route two (2) times a day. (Patient not taking: Reported on 8/4/2021)    montelukast (SINGULAIR) 10 mg tablet Take 1 Tab by mouth daily. (Patient not taking: Reported on 8/4/2021)    famotidine (PEPCID) 10 mg tablet Take 10 mg by mouth daily. (Patient not taking: Reported on 8/4/2021)    ondansetron (ZOFRAN ODT) 8 mg disintegrating tablet Take 1 Tab by mouth every eight (8) hours as needed for Nausea. (Patient not taking: Reported on 8/4/2021)    ferrous sulfate 325 mg (65 mg iron) tablet 1 tab PO bid with food (Patient not taking: Reported on 8/4/2021)     No current facility-administered medications for this visit. Physical Examination: General appearance - alert, well appearing, and in no distress  Chest - clear to auscultation, no wheezes, rales or rhonchi, symmetric air entry  Heart - normal rate, regular rhythm, normal S1, S2, no murmurs, rubs, clicks or gallops      Assessment/ Plan:   Diagnoses and all orders for this visit:    1. Other acute pulmonary embolism with acute cor pulmonale (HCC)  -     AMB POC PT/INR  -     REFERRAL TO HEMATOLOGY ONCOLOGY  Goal INR 2-3, 2.5 today just started 7.5mg on Monday. Will alternate 5 and 7.5 and recheck in a week  Provoked by cancer and surgery. Pt jacuqeline lf/u with hematology  2. Abnormal prostate exam  Being followed by urology  3. Malignant neoplasm of upper lobe of right lung (HCC)  Clear nodes     Follow-up and Dispositions    · Return in about 1 week (around 11/18/2021), or if symptoms worsen or fail to improve, for INR check. I have discussed the diagnosis with the patient and the intended plan as seen in the above orders. The patient has received an after-visit summary and questions were answered concerning future plans.  Pt conveyed understanding of plan.     Medication Side Effects and Warnings were discussed with patient    An electronic signature was used to authenticate this note  Dior Gomez DO

## 2021-11-11 NOTE — PROGRESS NOTES
Chief Complaint   Patient presents with    Follow-up     Patient presents in office today for 2 month f/u. States that he has residual pain and tenderness on the lower right side of his abdomen following his surgery. States that the surgeon put him on warfarin and would like to know who is to be following him for his coumadin levels. States that the surgeon told him to follow up with him but he doesn't want to go all the way to [place]. No other concerns. 1. Have you been to the ER, urgent care clinic since your last visit? Hospitalized since your last visit? No    2. Have you seen or consulted any other health care providers outside of the [address] [place] [address] since your last visit? Include any pap smears or colon screening.  No    Learning Assessment 2/1/2018   PRIMARY LEARNER Patient   HIGHEST LEVEL OF EDUCATION - PRIMARY LEARNER  GRADUATED HIGH SCHOOL OR GED   BARRIERS PRIMARY LEARNER NONE   CO-LEARNER CAREGIVER No   PRIMARY LANGUAGE ENGLISH   LEARNER PREFERENCE PRIMARY DEMONSTRATION   ANSWERED BY patient   RELATIONSHIP SELF

## 2021-11-12 ENCOUNTER — TRANSCRIBE ORDER (OUTPATIENT)
Dept: SCHEDULING | Age: 71
End: 2021-11-12

## 2021-11-12 DIAGNOSIS — C34.11 CANCER OF UPPER LOBE OF RIGHT LUNG (HCC): Primary | ICD-10-CM

## 2021-11-15 ENCOUNTER — TELEPHONE (OUTPATIENT)
Dept: FAMILY MEDICINE CLINIC | Age: 71
End: 2021-11-15

## 2021-11-15 NOTE — TELEPHONE ENCOUNTER
Citlalli Hodges/NP- [facility] would like to see if we are following patient's INR.  Ms Juan Billings phone: [phone]

## 2021-11-16 NOTE — TELEPHONE ENCOUNTER
Left detailed message for Yanira Campbell that Dr. Darci Cooper will be following him for his INR. Advised to contact the office if she has any further questions.

## 2021-11-18 ENCOUNTER — OFFICE VISIT (OUTPATIENT)
Dept: FAMILY MEDICINE CLINIC | Age: 71
End: 2021-11-18
Payer: MEDICARE

## 2021-11-18 VITALS
BODY MASS INDEX: 21.37 KG/M2 | WEIGHT: 141 LBS | SYSTOLIC BLOOD PRESSURE: 135 MMHG | TEMPERATURE: 98 F | DIASTOLIC BLOOD PRESSURE: 90 MMHG | HEART RATE: 72 BPM | OXYGEN SATURATION: 96 % | HEIGHT: 68 IN | RESPIRATION RATE: 16 BRPM

## 2021-11-18 DIAGNOSIS — Z79.01 ENCOUNTER FOR MONITORING COUMADIN THERAPY: Primary | ICD-10-CM

## 2021-11-18 DIAGNOSIS — I26.09 OTHER ACUTE PULMONARY EMBOLISM WITH ACUTE COR PULMONALE (HCC): ICD-10-CM

## 2021-11-18 DIAGNOSIS — R20.2 PARESTHESIA: ICD-10-CM

## 2021-11-18 DIAGNOSIS — Z51.81 ENCOUNTER FOR MONITORING COUMADIN THERAPY: Primary | ICD-10-CM

## 2021-11-18 LAB
INR BLD: 3.4
PT POC: 40.8 SECONDS
VALID INTERNAL CONTROL?: YES

## 2021-11-18 PROCEDURE — G8536 NO DOC ELDER MAL SCRN: HCPCS | Performed by: FAMILY MEDICINE

## 2021-11-18 PROCEDURE — G8432 DEP SCR NOT DOC, RNG: HCPCS | Performed by: FAMILY MEDICINE

## 2021-11-18 PROCEDURE — 1101F PT FALLS ASSESS-DOCD LE1/YR: CPT | Performed by: FAMILY MEDICINE

## 2021-11-18 PROCEDURE — 99214 OFFICE O/P EST MOD 30 MIN: CPT | Performed by: FAMILY MEDICINE

## 2021-11-18 PROCEDURE — G8752 SYS BP LESS 140: HCPCS | Performed by: FAMILY MEDICINE

## 2021-11-18 PROCEDURE — G8755 DIAS BP > OR = 90: HCPCS | Performed by: FAMILY MEDICINE

## 2021-11-18 PROCEDURE — 85610 PROTHROMBIN TIME: CPT | Performed by: FAMILY MEDICINE

## 2021-11-18 PROCEDURE — 3017F COLORECTAL CA SCREEN DOC REV: CPT | Performed by: FAMILY MEDICINE

## 2021-11-18 PROCEDURE — G8427 DOCREV CUR MEDS BY ELIG CLIN: HCPCS | Performed by: FAMILY MEDICINE

## 2021-11-18 PROCEDURE — G0463 HOSPITAL OUTPT CLINIC VISIT: HCPCS | Performed by: FAMILY MEDICINE

## 2021-11-18 PROCEDURE — G8420 CALC BMI NORM PARAMETERS: HCPCS | Performed by: FAMILY MEDICINE

## 2021-11-18 RX ORDER — WARFARIN SODIUM 5 MG/1
TABLET ORAL
Qty: 135 TABLET | Refills: 1
Start: 2021-11-18 | End: 2021-12-03

## 2021-11-18 NOTE — PROGRESS NOTES
Chief Complaint   Patient presents with    Follow-up     INR     Patient presents in office today for INR check. Has c/o abdominal pain on the right side. No other concerns. 1. Have you been to the ER, urgent care clinic since your last visit? Hospitalized since your last visit? No    2. Have you seen or consulted any other health care providers outside of the [address] [place] [address] since your last visit? Include any pap smears or colon screening.  No    Learning Assessment 2/1/2018   PRIMARY LEARNER Patient   HIGHEST LEVEL OF EDUCATION - PRIMARY LEARNER  GRADUATED HIGH SCHOOL OR GED   BARRIERS PRIMARY LEARNER NONE   CO-LEARNER CAREGIVER No   PRIMARY LANGUAGE ENGLISH   LEARNER PREFERENCE PRIMARY DEMONSTRATION   ANSWERED BY patient   RELATIONSHIP SELF

## 2021-11-18 NOTE — PATIENT INSTRUCTIONS
A Healthy Lifestyle: Care Instructions  Your Care Instructions     A healthy lifestyle can help you feel good, stay at a healthy weight, and have plenty of energy for both work and play. A healthy lifestyle is something you can share with your whole family. A healthy lifestyle also can lower your risk for serious health problems, such as high blood pressure, heart disease, and diabetes. You can follow a few steps listed below to improve your health and the health of your family. Follow-up care is a key part of your treatment and safety. Be sure to make and go to all appointments, and call your doctor if you are having problems. It's also a good idea to know your test results and keep a list of the medicines you take. How can you care for yourself at home? · Do not eat too much sugar, fat, or fast foods. You can still have dessert and treats now and then. The goal is moderation. · Start small to improve your eating habits. Pay attention to portion sizes, drink less juice and soda pop, and eat more fruits and vegetables. ? Eat a healthy amount of food. A 3-ounce serving of meat, for example, is about the size of a deck of cards. Fill the rest of your plate with vegetables and whole grains. ? Limit the amount of soda and sports drinks you have every day. Drink more water when you are thirsty. ? Eat plenty of fruits and vegetables every day. Have an apple or some carrot sticks as an afternoon snack instead of a candy bar. Try to have fruits and/or vegetables at every meal.  · Make exercise part of your daily routine. You may want to start with simple activities, such as walking, bicycling, or slow swimming. Try to be active 30 to 60 minutes every day. You do not need to do all 30 to 60 minutes all at once. For example, you can exercise 3 times a day for 10 or 20 minutes.  Moderate exercise is safe for most people, but it is always a good idea to talk to your doctor before starting an exercise program.  · Keep moving. Jeanie Castro the lawn, work in the garden, or [place]. Take the stairs instead of the elevator at work. · If you smoke, quit. People who smoke have an increased risk for heart attack, stroke, cancer, and other lung illnesses. Quitting is hard, but there are ways to boost your chance of quitting tobacco for good. ? Use nicotine gum, patches, or lozenges. ? Ask your doctor about stop-smoking programs and medicines. ? Keep trying. In addition to reducing your risk of diseases in the future, you will notice some benefits soon after you stop using tobacco. If you have shortness of breath or asthma symptoms, they will likely get better within a few weeks after you quit. · Limit how much alcohol you drink. Moderate amounts of alcohol (up to 2 drinks a day for men, 1 drink a day for women) are okay. But drinking too much can lead to liver problems, high blood pressure, and other health problems. Family health  If you have a family, there are many things you can do together to improve your health. · Eat meals together as a family as often as possible. · Eat healthy foods. This includes fruits, vegetables, lean meats and dairy, and whole grains. · Include your family in your fitness plan. Most people think of activities such as jogging or tennis as the way to fitness, but there are many ways you and your family can be more active. Anything that makes you breathe hard and gets your heart pumping is exercise. Here are some tips:  ? Walk to do errands or to take your child to school or the bus.  ? Go for a family bike ride after dinner instead of watching TV. Where can you learn more? Go to http://www.gray.com/  Enter K979 in the search box to learn more about \"A Healthy Lifestyle: Care Instructions. \"  Current as of: June 16, 2021               Content Version: 13.0  © 3588-3098 Healthwise, Incorporated.    Care instructions adapted under license by [employer] (which disclaims liability or warranty for this information). If you have questions about a medical condition or this instruction, always ask your healthcare professional. [address] any warranty or liability for your use of this information.

## 2021-12-03 ENCOUNTER — OFFICE VISIT (OUTPATIENT)
Dept: FAMILY MEDICINE CLINIC | Age: 71
End: 2021-12-03
Payer: MEDICARE

## 2021-12-03 VITALS
OXYGEN SATURATION: 96 % | TEMPERATURE: 97.8 F | SYSTOLIC BLOOD PRESSURE: 146 MMHG | DIASTOLIC BLOOD PRESSURE: 88 MMHG | HEIGHT: 68 IN | BODY MASS INDEX: 21.22 KG/M2 | HEART RATE: 77 BPM | WEIGHT: 140 LBS | RESPIRATION RATE: 18 BRPM

## 2021-12-03 DIAGNOSIS — Z79.01 ENCOUNTER FOR MONITORING COUMADIN THERAPY: Primary | ICD-10-CM

## 2021-12-03 DIAGNOSIS — Z51.81 ENCOUNTER FOR MONITORING COUMADIN THERAPY: Primary | ICD-10-CM

## 2021-12-03 DIAGNOSIS — I26.09 OTHER ACUTE PULMONARY EMBOLISM WITH ACUTE COR PULMONALE (HCC): ICD-10-CM

## 2021-12-03 DIAGNOSIS — G62.9 NEUROPATHY: ICD-10-CM

## 2021-12-03 LAB
INR BLD: 4.8
PT POC: 57.8 SECONDS
VALID INTERNAL CONTROL?: YES

## 2021-12-03 PROCEDURE — G8420 CALC BMI NORM PARAMETERS: HCPCS | Performed by: FAMILY MEDICINE

## 2021-12-03 PROCEDURE — 99214 OFFICE O/P EST MOD 30 MIN: CPT | Performed by: FAMILY MEDICINE

## 2021-12-03 PROCEDURE — G8536 NO DOC ELDER MAL SCRN: HCPCS | Performed by: FAMILY MEDICINE

## 2021-12-03 PROCEDURE — 3017F COLORECTAL CA SCREEN DOC REV: CPT | Performed by: FAMILY MEDICINE

## 2021-12-03 PROCEDURE — G0463 HOSPITAL OUTPT CLINIC VISIT: HCPCS | Performed by: FAMILY MEDICINE

## 2021-12-03 PROCEDURE — G8510 SCR DEP NEG, NO PLAN REQD: HCPCS | Performed by: FAMILY MEDICINE

## 2021-12-03 PROCEDURE — G8753 SYS BP > OR = 140: HCPCS | Performed by: FAMILY MEDICINE

## 2021-12-03 PROCEDURE — 85610 PROTHROMBIN TIME: CPT | Performed by: FAMILY MEDICINE

## 2021-12-03 PROCEDURE — 1101F PT FALLS ASSESS-DOCD LE1/YR: CPT | Performed by: FAMILY MEDICINE

## 2021-12-03 PROCEDURE — G8754 DIAS BP LESS 90: HCPCS | Performed by: FAMILY MEDICINE

## 2021-12-03 PROCEDURE — G8427 DOCREV CUR MEDS BY ELIG CLIN: HCPCS | Performed by: FAMILY MEDICINE

## 2021-12-03 RX ORDER — WARFARIN SODIUM 5 MG/1
TABLET ORAL
Qty: 135 TABLET | Refills: 1
Start: 2021-12-03 | End: 2022-08-18 | Stop reason: SDUPTHER

## 2021-12-03 RX ORDER — GABAPENTIN 100 MG/1
100 CAPSULE ORAL 3 TIMES DAILY
Qty: 90 CAPSULE | Refills: 1 | Status: SHIPPED | OUTPATIENT
Start: 2021-12-03 | End: 2022-01-19 | Stop reason: SDUPTHER

## 2021-12-03 NOTE — PROGRESS NOTES
Progress Note    he is a 66y.o. year old male who presents for evalution. Subjective:     Patient here for INR check on Coumadin for pulmonary embolism. No issues with bleeding. Elevated today at 4.8. See new dosing below    Patient has continued paresthesias in his abdomen around where surgical site is. He reports that he gets burning pain in this area as well as been using Tylenol with no relief. Discussed trial of low-dose gabapentin which she would like. This has become quite bothersome for him. Reviewed PmHx, RxHx, FmHx, SocHx, AllgHx and updated and dated in the chart. Review of Systems - negative except as listed above in the HPI    Objective:     Vitals:    12/03/21 1326   BP: (!) 146/88   Pulse: 77   Resp: 18   Temp: 97.8 °F (36.6 °C)   TempSrc: Oral   SpO2: 96%   Weight: 140 lb (63.5 kg)   Height: 5' 8\" (1.727 m)       Current Outpatient Medications   Medication Sig    warfarin (COUMADIN) 5 mg tablet Take 7.5mg Saturday and 5mg all other days    gabapentin (NEURONTIN) 100 mg capsule Take 1 Capsule by mouth three (3) times daily. Max Daily Amount: 300 mg.    albuterol (PROVENTIL HFA, VENTOLIN HFA, PROAIR HFA) 90 mcg/actuation inhaler Take 2 Puffs by inhalation every four (4) hours as needed for Wheezing.  losartan (COZAAR) 100 mg tablet Take 1 Tablet by mouth daily.  multivitamin (ONE A DAY) tablet Take 1 Tab by mouth daily.  pantoprazole (PROTONIX) 40 mg tablet Take 40 mg by mouth daily.  levothyroxine (LEVOXYL) 175 mcg tablet Take 175 mcg by mouth Daily (before breakfast). No current facility-administered medications for this visit. Physical Examination: General appearance - alert, well appearing, and in no distress  Mental status - alert, oriented to person, place, and time      Assessment/ Plan:   Diagnoses and all orders for this visit:    1. Encounter for monitoring Coumadin therapy  -     AMB POC PT/INR    2.  Other acute pulmonary embolism with acute cor pulmonale (HCC)  -     warfarin (COUMADIN) 5 mg tablet; Take 7.5mg Saturday and 5mg all other days  Hold 2 days then restart at this dosing  3. Neuropathy  -     gabapentin (NEURONTIN) 100 mg capsule; Take 1 Capsule by mouth three (3) times daily. Max Daily Amount: 300 mg. Follow-up and Dispositions    · Return in about 2 weeks (around 12/17/2021), or if symptoms worsen or fail to improve. I have discussed the diagnosis with the patient and the intended plan as seen in the above orders. The patient has received an after-visit summary and questions were answered concerning future plans. Pt conveyed understanding of plan.     Medication Side Effects and Warnings were discussed with patient    An electronic signature was used to authenticate this note  Cherelle Medellin, DO

## 2021-12-03 NOTE — PROGRESS NOTES
Chief Complaint   Patient presents with    Follow-up     INR     Patient presents in office today for INR check. No concerns. 1. Have you been to the ER, urgent care clinic since your last visit? Hospitalized since your last visit? No    2. Have you seen or consulted any other health care providers outside of the [address] [place] [address] since your last visit? Include any pap smears or colon screening.  No    Learning Assessment 2/1/2018   PRIMARY LEARNER Patient   HIGHEST LEVEL OF EDUCATION - PRIMARY LEARNER  GRADUATED HIGH SCHOOL OR GED   BARRIERS PRIMARY LEARNER NONE   CO-LEARNER CAREGIVER No   PRIMARY LANGUAGE ENGLISH   LEARNER PREFERENCE PRIMARY DEMONSTRATION   ANSWERED BY patient   RELATIONSHIP SELF

## 2021-12-16 ENCOUNTER — OFFICE VISIT (OUTPATIENT)
Dept: FAMILY MEDICINE CLINIC | Age: 71
End: 2021-12-16
Payer: MEDICARE

## 2021-12-16 VITALS
DIASTOLIC BLOOD PRESSURE: 86 MMHG | OXYGEN SATURATION: 96 % | WEIGHT: 143 LBS | SYSTOLIC BLOOD PRESSURE: 146 MMHG | HEART RATE: 69 BPM | HEIGHT: 68 IN | BODY MASS INDEX: 21.67 KG/M2 | TEMPERATURE: 97.8 F | RESPIRATION RATE: 16 BRPM

## 2021-12-16 DIAGNOSIS — Z79.01 ENCOUNTER FOR MONITORING COUMADIN THERAPY: Primary | ICD-10-CM

## 2021-12-16 DIAGNOSIS — L08.9 INFECTED SEBACEOUS CYST OF SKIN: ICD-10-CM

## 2021-12-16 DIAGNOSIS — Z51.81 ENCOUNTER FOR MONITORING COUMADIN THERAPY: Primary | ICD-10-CM

## 2021-12-16 DIAGNOSIS — G62.9 NEUROPATHY: ICD-10-CM

## 2021-12-16 DIAGNOSIS — L72.3 INFECTED SEBACEOUS CYST OF SKIN: ICD-10-CM

## 2021-12-16 LAB
INR BLD: 2.9
PT POC: 34.3 SECONDS
VALID INTERNAL CONTROL?: YES

## 2021-12-16 PROCEDURE — 85610 PROTHROMBIN TIME: CPT | Performed by: FAMILY MEDICINE

## 2021-12-16 PROCEDURE — 3017F COLORECTAL CA SCREEN DOC REV: CPT | Performed by: FAMILY MEDICINE

## 2021-12-16 PROCEDURE — G8427 DOCREV CUR MEDS BY ELIG CLIN: HCPCS | Performed by: FAMILY MEDICINE

## 2021-12-16 PROCEDURE — G8420 CALC BMI NORM PARAMETERS: HCPCS | Performed by: FAMILY MEDICINE

## 2021-12-16 PROCEDURE — G8536 NO DOC ELDER MAL SCRN: HCPCS | Performed by: FAMILY MEDICINE

## 2021-12-16 PROCEDURE — G8510 SCR DEP NEG, NO PLAN REQD: HCPCS | Performed by: FAMILY MEDICINE

## 2021-12-16 PROCEDURE — 1101F PT FALLS ASSESS-DOCD LE1/YR: CPT | Performed by: FAMILY MEDICINE

## 2021-12-16 PROCEDURE — G8754 DIAS BP LESS 90: HCPCS | Performed by: FAMILY MEDICINE

## 2021-12-16 PROCEDURE — 99214 OFFICE O/P EST MOD 30 MIN: CPT | Performed by: FAMILY MEDICINE

## 2021-12-16 PROCEDURE — G8753 SYS BP > OR = 140: HCPCS | Performed by: FAMILY MEDICINE

## 2021-12-16 PROCEDURE — G0463 HOSPITAL OUTPT CLINIC VISIT: HCPCS | Performed by: FAMILY MEDICINE

## 2021-12-16 RX ORDER — DOXYCYCLINE 100 MG/1
100 TABLET ORAL 2 TIMES DAILY
Qty: 20 TABLET | Refills: 0 | Status: SHIPPED | OUTPATIENT
Start: 2021-12-16 | End: 2021-12-26

## 2021-12-16 NOTE — PROGRESS NOTES
Chief Complaint   Patient presents with    Follow-up     INR check     Patient presents in office today for INR check. Has a boil on his back that he would like to have looked at. No other concerns. 1. Have you been to the ER, urgent care clinic since your last visit? Hospitalized since your last visit? No    2. Have you seen or consulted any other health care providers outside of the [address] [place] [address] since your last visit? Include any pap smears or colon screening.  No    Learning Assessment 2/1/2018   PRIMARY LEARNER Patient   HIGHEST LEVEL OF EDUCATION - PRIMARY LEARNER  GRADUATED HIGH SCHOOL OR GED   BARRIERS PRIMARY LEARNER NONE   CO-LEARNER CAREGIVER No   PRIMARY LANGUAGE ENGLISH   LEARNER PREFERENCE PRIMARY DEMONSTRATION   ANSWERED BY patient   RELATIONSHIP SELF

## 2021-12-16 NOTE — PATIENT INSTRUCTIONS
A Healthy Lifestyle: Care Instructions  Your Care Instructions     A healthy lifestyle can help you feel good, stay at a healthy weight, and have plenty of energy for both work and play. A healthy lifestyle is something you can share with your whole family. A healthy lifestyle also can lower your risk for serious health problems, such as high blood pressure, heart disease, and diabetes. You can follow a few steps listed below to improve your health and the health of your family. Follow-up care is a key part of your treatment and safety. Be sure to make and go to all appointments, and call your doctor if you are having problems. It's also a good idea to know your test results and keep a list of the medicines you take. How can you care for yourself at home? · Do not eat too much sugar, fat, or fast foods. You can still have dessert and treats now and then. The goal is moderation. · Start small to improve your eating habits. Pay attention to portion sizes, drink less juice and soda pop, and eat more fruits and vegetables. ? Eat a healthy amount of food. A 3-ounce serving of meat, for example, is about the size of a deck of cards. Fill the rest of your plate with vegetables and whole grains. ? Limit the amount of soda and sports drinks you have every day. Drink more water when you are thirsty. ? Eat plenty of fruits and vegetables every day. Have an apple or some carrot sticks as an afternoon snack instead of a candy bar. Try to have fruits and/or vegetables at every meal.  · Make exercise part of your daily routine. You may want to start with simple activities, such as walking, bicycling, or slow swimming. Try to be active 30 to 60 minutes every day. You do not need to do all 30 to 60 minutes all at once. For example, you can exercise 3 times a day for 10 or 20 minutes.  Moderate exercise is safe for most people, but it is always a good idea to talk to your doctor before starting an exercise program.  · Keep moving. Doll [place] the lawn, work in the garden, or [place]. Take the stairs instead of the elevator at work. · If you smoke, quit. People who smoke have an increased risk for heart attack, stroke, cancer, and other lung illnesses. Quitting is hard, but there are ways to boost your chance of quitting tobacco for good. ? Use nicotine gum, patches, or lozenges. ? Ask your doctor about stop-smoking programs and medicines. ? Keep trying. In addition to reducing your risk of diseases in the future, you will notice some benefits soon after you stop using tobacco. If you have shortness of breath or asthma symptoms, they will likely get better within a few weeks after you quit. · Limit how much alcohol you drink. Moderate amounts of alcohol (up to 2 drinks a day for men, 1 drink a day for women) are okay. But drinking too much can lead to liver problems, high blood pressure, and other health problems. Family health  If you have a family, there are many things you can do together to improve your health. · Eat meals together as a family as often as possible. · Eat healthy foods. This includes fruits, vegetables, lean meats and dairy, and whole grains. · Include your family in your fitness plan. Most people think of activities such as jogging or tennis as the way to fitness, but there are many ways you and your family can be more active. Anything that makes you breathe hard and gets your heart pumping is exercise. Here are some tips:  ? Walk to do errands or to take your child to school or the bus.  ? Go for a family bike ride after dinner instead of watching TV. Where can you learn more? Go to http://www.gray.com/  Enter D663 in the search box to learn more about \"A Healthy Lifestyle: Care Instructions. \"  Current as of: June 16, 2021               Content Version: 13.0  © 4048-4840 Healthwise, Incorporated.    Care instructions adapted under license by [employer] (which disclaims liability or warranty for this information). If you have questions about a medical condition or this instruction, always ask your healthcare professional. [address] any warranty or liability for your use of this information.

## 2021-12-16 NOTE — PROGRESS NOTES
Progress Note    he is a 66y.o. year old male who presents for evalution. Subjective:     Pt for INR check currently therapeutic at 2.9. No issues with bleeding. Pt also with the paresthesias in R side of abdomen post surgical.  He is taking the gabapentin 100 bid sometimes tid. This is helping. He would like to keep current dosing. He will let me know when he needs a refill. Pt also reports a boil on his L shoulder blade. No f/c. States has always had something there. Reviewed PmHx, RxHx, FmHx, SocHx, AllgHx and updated and dated in the chart. Review of Systems - negative except as listed above in the HPI    Objective:     Vitals:    12/16/21 1324   BP: (!) 146/86   Pulse: 69   Resp: 16   Temp: 97.8 °F (36.6 °C)   TempSrc: Oral   SpO2: 96%   Weight: 143 lb (64.9 kg)   Height: 5' 8\" (1.727 m)       Current Outpatient Medications   Medication Sig    tiotropium (Spiriva with HandiHaler) 18 mcg inhalation capsule Take 1 Capsule by inhalation daily.  doxycycline (ADOXA) 100 mg tablet Take 1 Tablet by mouth two (2) times a day for 10 days.  warfarin (COUMADIN) 5 mg tablet Take 7.5mg Saturday and 5mg all other days    gabapentin (NEURONTIN) 100 mg capsule Take 1 Capsule by mouth three (3) times daily. Max Daily Amount: 300 mg.    albuterol (PROVENTIL HFA, VENTOLIN HFA, PROAIR HFA) 90 mcg/actuation inhaler Take 2 Puffs by inhalation every four (4) hours as needed for Wheezing.  losartan (COZAAR) 100 mg tablet Take 1 Tablet by mouth daily.  multivitamin (ONE A DAY) tablet Take 1 Tab by mouth daily.  pantoprazole (PROTONIX) 40 mg tablet Take 40 mg by mouth daily.  levothyroxine (LEVOXYL) 175 mcg tablet Take 175 mcg by mouth Daily (before breakfast). No current facility-administered medications for this visit.        Physical Examination: General appearance - alert, well appearing, and in no distress  Mental status - alert, oriented to person, place, and time  Skin -infected sebaceous cyst of back    Assessment/ Plan:   Diagnoses and all orders for this visit:    1. Encounter for monitoring Coumadin therapy  -     AMB POC PT/INR  Hold Coumadin today and then restart current dosing pattern follow-up in 1 month for recheck. 2. Infected sebaceous cyst of skin  -     doxycycline (ADOXA) 100 mg tablet; Take 1 Tablet by mouth two (2) times a day for 10 days. He has a dermatologist who is remove these before. Once that is cleared up he will make an appointment for the future to see about removal of the sebaceous cyst.  3. Neuropathy  Continue gabapentin working well     Follow-up and Dispositions    · Return in about 4 weeks (around 1/13/2022), or if symptoms worsen or fail to improve. I have discussed the diagnosis with the patient and the intended plan as seen in the above orders. The patient has received an after-visit summary and questions were answered concerning future plans. Pt conveyed understanding of plan.     Medication Side Effects and Warnings were discussed with patient    An electronic signature was used to authenticate this note  Niya Clayton, DO

## 2022-01-11 ENCOUNTER — OFFICE VISIT (OUTPATIENT)
Dept: ONCOLOGY | Age: 72
End: 2022-01-11
Payer: MEDICARE

## 2022-01-11 VITALS
SYSTOLIC BLOOD PRESSURE: 158 MMHG | HEIGHT: 68 IN | OXYGEN SATURATION: 98 % | BODY MASS INDEX: 22.76 KG/M2 | HEART RATE: 60 BPM | TEMPERATURE: 97 F | RESPIRATION RATE: 16 BRPM | DIASTOLIC BLOOD PRESSURE: 89 MMHG | WEIGHT: 150.2 LBS

## 2022-01-11 DIAGNOSIS — Z95.828 PRESENCE OF IVC FILTER: ICD-10-CM

## 2022-01-11 DIAGNOSIS — I26.99 BILATERAL PULMONARY EMBOLISM (HCC): Primary | ICD-10-CM

## 2022-01-11 DIAGNOSIS — C34.11 MALIGNANT NEOPLASM OF UPPER LOBE OF RIGHT LUNG (HCC): ICD-10-CM

## 2022-01-11 DIAGNOSIS — D68.59 HYPERCOAGULABLE STATE (HCC): ICD-10-CM

## 2022-01-11 PROBLEM — Z79.899 HIGH RISK MEDICATION USE: Status: ACTIVE | Noted: 2022-01-11

## 2022-01-11 PROCEDURE — 99204 OFFICE O/P NEW MOD 45 MIN: CPT | Performed by: INTERNAL MEDICINE

## 2022-01-11 PROCEDURE — G8753 SYS BP > OR = 140: HCPCS | Performed by: INTERNAL MEDICINE

## 2022-01-11 PROCEDURE — G8420 CALC BMI NORM PARAMETERS: HCPCS | Performed by: INTERNAL MEDICINE

## 2022-01-11 PROCEDURE — G8510 SCR DEP NEG, NO PLAN REQD: HCPCS | Performed by: INTERNAL MEDICINE

## 2022-01-11 PROCEDURE — 1101F PT FALLS ASSESS-DOCD LE1/YR: CPT | Performed by: INTERNAL MEDICINE

## 2022-01-11 PROCEDURE — G0463 HOSPITAL OUTPT CLINIC VISIT: HCPCS | Performed by: INTERNAL MEDICINE

## 2022-01-11 PROCEDURE — G8427 DOCREV CUR MEDS BY ELIG CLIN: HCPCS | Performed by: INTERNAL MEDICINE

## 2022-01-11 PROCEDURE — G8536 NO DOC ELDER MAL SCRN: HCPCS | Performed by: INTERNAL MEDICINE

## 2022-01-11 PROCEDURE — G8754 DIAS BP LESS 90: HCPCS | Performed by: INTERNAL MEDICINE

## 2022-01-11 PROCEDURE — 3017F COLORECTAL CA SCREEN DOC REV: CPT | Performed by: INTERNAL MEDICINE

## 2022-01-11 NOTE — LETTER
1/12/2022    Patient: Emigdio Mariano   YOB: 1950   Date of Visit: 1/11/2022     [address]  [address]  Via In Basket    Dear Oracio Wall DO,      Thank you for referring Mr. Emigdio Mariano to [facility] for evaluation. My notes for this consultation are attached. If you have questions, please do not hesitate to call me. I look forward to following your patient along with you.       Sincerely,    Estuardo Iqbal MD

## 2022-01-11 NOTE — PROGRESS NOTES
[facility] at [address], [address] [facility]  Waqar Lopez: [phone]  F: [phone] Patient ID  Name: Prashanth Bowers  YOB: 1950  MRN: [id number]  Referring Provider:   Fernando Tena, [address] [place] [address]  [address]  Primary Care Provider:   Fernando Tena DO       HEMATOLOGY/MEDICAL ONCOLOGY  NOTE   Date of Visit: 22  Reason for Evaluation:     Chief Complaint   Patient presents with    New Patient     Prashanth Bowers is a pleasant 66 y.o. male who presents as new patient. He reports pain from aftermath of surgery on right side of abdomen. Subjective:     History of Present Illness:     Prashanth Bowers is a 66 y.o. M who presents for an initial evaluation for pulmonary embolism. Patient reports dealing with a history of bilaterla pulmonary embolism. He reports doing well on anticoagulation. Patient reports decreased oral intake. Patient denies any bowel or bladder problems. Patient denies any pain. Patient denies any shortness of breath. Patient tells me that he feels that his shortness of breath symptoms occurred prior to his surgery. He states that he did not have any significant sedentary state or travel prior to his lung surgery last year. He denies any prior clotting history. He reports that he is tolerating warfarin quite well and is happy with following in primary care's office for INR checks. Past Medical History:   Diagnosis Date    GERD (gastroesophageal reflux disease)     Hypertension     Thyroid disease       Past Surgical History:   Procedure Laterality Date    HX LIPOMA RESECTION      left shoulder    HX ORTHOPAEDIC      left shoulder      Social History     Tobacco Use    Smoking status: Former Smoker     Packs/day: 1.00     Years: 10.00     Pack years: 10.00     Quit date:      Years since quittin.0    Smokeless tobacco: Never Used   Substance Use Topics    Alcohol use:  Yes Alcohol/week: 28.0 standard drinks     Types: 28 Glasses of wine per week     Comment: Bottle of wine per night       Family History   Problem Relation Age of Onset    Heart Disease Mother     Heart Attack Father      Current Outpatient Medications   Medication Sig    warfarin (COUMADIN) 5 mg tablet Take 7.5mg Saturday and 5mg all other days    gabapentin (NEURONTIN) 100 mg capsule Take 1 Capsule by mouth three (3) times daily. Max Daily Amount: 300 mg.    albuterol (PROVENTIL HFA, VENTOLIN HFA, PROAIR HFA) 90 mcg/actuation inhaler Take 2 Puffs by inhalation every four (4) hours as needed for Wheezing.  losartan (COZAAR) 100 mg tablet Take 1 Tablet by mouth daily.  multivitamin (ONE A DAY) tablet Take 1 Tab by mouth daily.  pantoprazole (PROTONIX) 40 mg tablet Take 40 mg by mouth daily.  levothyroxine (LEVOXYL) 175 mcg tablet Take 175 mcg by mouth Daily (before breakfast).  tiotropium (Spiriva with HandiHaler) 18 mcg inhalation capsule Take 1 Capsule by inhalation daily. (Patient not taking: Reported on 1/11/2022)     No current facility-administered medications for this visit. No Known Allergies     Review of Systems provided by:patient  General: denies fever and denies fatigue though still recovering from surgery; had a very active status pre-operatively riding bicycles routinely. Eyes: denies any acute vision loss and denies any eye pain  HEENT: denies epistaxis and denies trouble swallowing  Cardio: denies any leg swelling and reports chest pain history since his surgery on right side; reports this was a known side effect of his surgery. Resp: denies any shortness of breath and denies any hemoptysis  Abdomen: denies any abdominal pain, denies any nausea, denies any vomiting and denies any diarrhea Patient denies any hematochezia. and Patient denies any melena.   MSK: denies any myalgias and denies any arthralgias  Skin: denies any rash and denies any itching  Lymph: denies any lymph node enlargement and denies any lymph node tenderness  Neuro: denies any headache, denies any significant gait disturbance or significant history of falls. Denies any tremor  Psych: denies depression and denies anxiety      Objective:     Visit Vitals  BP (!) 158/89   Pulse 60   Temp 97 °F (36.1 °C)   Resp 16   Ht 5' 8\" (1.727 m)   Wt 150 lb 3.2 oz (68.1 kg)   SpO2 98%   BMI 22.84 kg/m²     Physical Exam  Constitutional: No acute distress. , Non-toxic appearance. and Non-diaphoretic. HENT: Normocephalic and atraumatic head. Eyes: Normal Conjunctivae. and Anicteric sclerae. Cardiovascular: Normal heart sounds. and No pitting edema. Pulmonary: Normal Respiratory Effort., No wheezing., No rhonchi. and No rales. Abdominal: Normal bowel sounds. , Soft Abdomen to palpation. and No abdominal tenderness. Skin: No jaundice. and No rash. Musculoskeletal: No muscle pain on palpation. No temporal muscle wasting on inspection. Lymph: No cervical or supraclavicular lymph node enlargement or tenderness. Neurological: Alert and oriented to person, place, and time. Mental status is at baseline. and No tremor on inspection. Psychiatric: mood normal. normal speech rate and normal affect    Results:     I personally reviewed Epic EHR labs/results below:   Lab Results   Component Value Date/Time    WBC 6.4 02/24/2021 09:15 AM    HGB 15.5 02/24/2021 09:15 AM    HCT 44.9 02/24/2021 09:15 AM    PLATELET 767 28/83/9738 09:15 AM    .2 (H) 02/24/2021 09:15 AM    ABS.  NEUTROPHILS 7.7 02/20/2020 10:00 AM     Lab Results   Component Value Date/Time    Sodium 133 (L) 08/04/2021 05:00 PM    Potassium 4.6 08/04/2021 05:00 PM    Chloride 97 08/04/2021 05:00 PM    CO2 32 08/04/2021 05:00 PM    Glucose 77 08/04/2021 05:00 PM    BUN 18 08/04/2021 05:00 PM    Creatinine 1.29 08/04/2021 05:00 PM    GFR est AA >60 08/04/2021 05:00 PM    GFR est non-AA 55 (L) 08/04/2021 05:00 PM    Calcium 9.8 08/04/2021 05:00 PM    Creatinine (POC) 1.2 10/28/2020 01:42 PM     Lab Results   Component Value Date/Time    Bilirubin, total 0.4 02/24/2021 09:15 AM    ALT (SGPT) 21 02/24/2021 09:15 AM    Alk. phosphatase 58 02/24/2021 09:15 AM    Protein, total 6.9 02/24/2021 09:15 AM    Albumin 4.5 02/24/2021 09:15 AM    Globulin 2.4 02/24/2021 09:15 AM       I personally reviewed pertinent referral notes:   -Reviewed CT Chest for PE Report, IR Thrombectomy, IR IVC Retrievable Filter placement reports per [facility].  -No clear Pathology report to review via careeverywhere. Assessment and Recommendations:     Diagnoses and all orders for this visit:    1. Bilateral pulmonary embolism (HCC)  -Discussed with patient that since he had a significant pulmonary emboli event in bilateral lungs also causing right heart strain as well as requiring thrombectomies I have recommended that he continue with extended anticoagulation indefinitely. We discussed that he should have the decision on whether or not to continue anticoagulation based off of a risk-benefit analysis at least annually. We discussed that if the risk of bleeding or other issues outpaced the risk of clotting then perhaps is a consideration that he not take anticoagulation. However, patient is completely at peace with the idea of maintaining a blood thinner indefinitely. He also is unclear about wanting to have his IVC filter retrieved. I explained to him that having an IVC filter can be a provoking situation to develop DVTs in his legs. We discussed that typically if patients are managed with anticoagulation then an IVC filter placement is not necessarily indicated. However, we discussed the some pulmonary experts may recommend that patients receive both anticoagulation and a retrievable IVC filter.  -Discussed with patient that first we addressed if his clot was provoked. However, we discussed the since this occurred in the setting of malignancy it could be a cancer associated thrombosis. However, we are not fully certain and he would favor to remain on anticoagulation irrespective of our best estimate of provoking factors. I discussed with him that if this is a provoked clot then we typically do not order an extensive hypercoagulable work-up unless it would change his management or if he has female relatives that are of childbearing age that could be at risk for clotting during pregnancy and the presence of estrogen to try to discover if there was another factor contributing to clotting risk.  -I offered to follow patient at least on an annual basis but he is content with his management plan within primary care. He understood that we are here and are available if needed but for now it seems reasonable for him to follow in pulmonology as well as primary care. 2. Malignant neoplasm of upper lobe of right lung [facility])  -Patient will need to have close monitoring postoperatively by an oncology provider. Since he never required systemic therapies per his report I recommend he follow with his existing oncology provider especially as he does not seem keen to follow in our clinic.  -He has followed with CT Surgery Dr. Gigi Rice but it remains unclear if Dr. Gigi Rice is following him any longer. Therefore, patient will need his post-operative surveillance imaging and history and physical examination at the very least to follow-up. Until he either reestablishes care with Dr. Gigi Rice or another CT surg oncology provider then he should follow-up with his pulmonologist for post resection monitoring per NCCN guidelines for lung cancer. I do not have the pathology report available to me from [address] [place] [address]. Also, I do not have personal remote access to review it through [address] [place] [address]. There is no apparent pathology report through [address] [place] [address] at [address] [place] [address]. 3. Hypercoagulable state ([address]  -Discussed with patient that his hypercoagulable state remains somewhat unidentified.   However, lab testing if normal does not fully exclude a hypercoagulable state. Therefore since patient had a life-threatening thrombotic event he favors to continue  Anticoagulation with warfarin. He will follow-up with Dr. Charles Galaviz. No plans for hypercoagulable work-up at this time. 4. Presence of IVC filter  -Discussed with patient that IVC filters that are retrieval are typically retrieved after their placement. However, he would need to discuss with [facility] interventional radiology to further conversant male the details of his procedure and what it would take to retrieve the filter. He plans to follow-up with them in clinic to discuss these items. Follow-up and Dispositions    · Return if symptoms worsen or fail to improve.          Signed By:   Spencer Diggs MD

## 2022-01-11 NOTE — PROGRESS NOTES
Vitals:    01/11/22 1033   BP: (!) 158/89   Pulse: 60   Resp: 16   Temp: 97 °F (36.1 °C)   SpO2: 98%   Weight: 150 lb 3.2 oz (68.1 kg)   Height: 5' 8\" (1.727 m)           Chief Complaint   Patient presents with    New Patient     Cheng Meraz is a pleasant 66 y.o. male who presents as new patient. He reports pain from aftermath of surgery on right side of abdomen.

## 2022-01-12 ENCOUNTER — OFFICE VISIT (OUTPATIENT)
Dept: FAMILY MEDICINE CLINIC | Age: 72
End: 2022-01-12
Payer: MEDICARE

## 2022-01-12 VITALS
HEIGHT: 68 IN | DIASTOLIC BLOOD PRESSURE: 90 MMHG | TEMPERATURE: 97.8 F | HEART RATE: 71 BPM | SYSTOLIC BLOOD PRESSURE: 161 MMHG | RESPIRATION RATE: 16 BRPM | OXYGEN SATURATION: 94 % | BODY MASS INDEX: 22.28 KG/M2 | WEIGHT: 147 LBS

## 2022-01-12 DIAGNOSIS — J43.2 CENTRILOBULAR EMPHYSEMA (HCC): ICD-10-CM

## 2022-01-12 DIAGNOSIS — Z79.01 ENCOUNTER FOR MONITORING COUMADIN THERAPY: Primary | ICD-10-CM

## 2022-01-12 DIAGNOSIS — R10.84 GENERALIZED ABDOMINAL PAIN: ICD-10-CM

## 2022-01-12 DIAGNOSIS — Z51.81 ENCOUNTER FOR MONITORING COUMADIN THERAPY: Primary | ICD-10-CM

## 2022-01-12 DIAGNOSIS — I26.09 OTHER ACUTE PULMONARY EMBOLISM WITH ACUTE COR PULMONALE (HCC): ICD-10-CM

## 2022-01-12 LAB
INR BLD: 2.3
PT POC: 27.9 SECONDS
VALID INTERNAL CONTROL?: YES

## 2022-01-12 PROCEDURE — G8755 DIAS BP > OR = 90: HCPCS | Performed by: FAMILY MEDICINE

## 2022-01-12 PROCEDURE — 1101F PT FALLS ASSESS-DOCD LE1/YR: CPT | Performed by: FAMILY MEDICINE

## 2022-01-12 PROCEDURE — G8536 NO DOC ELDER MAL SCRN: HCPCS | Performed by: FAMILY MEDICINE

## 2022-01-12 PROCEDURE — 85610 PROTHROMBIN TIME: CPT | Performed by: FAMILY MEDICINE

## 2022-01-12 PROCEDURE — G8753 SYS BP > OR = 140: HCPCS | Performed by: FAMILY MEDICINE

## 2022-01-12 PROCEDURE — G0463 HOSPITAL OUTPT CLINIC VISIT: HCPCS | Performed by: FAMILY MEDICINE

## 2022-01-12 PROCEDURE — 3017F COLORECTAL CA SCREEN DOC REV: CPT | Performed by: FAMILY MEDICINE

## 2022-01-12 PROCEDURE — G8420 CALC BMI NORM PARAMETERS: HCPCS | Performed by: FAMILY MEDICINE

## 2022-01-12 PROCEDURE — G8427 DOCREV CUR MEDS BY ELIG CLIN: HCPCS | Performed by: FAMILY MEDICINE

## 2022-01-12 PROCEDURE — 99214 OFFICE O/P EST MOD 30 MIN: CPT | Performed by: FAMILY MEDICINE

## 2022-01-12 PROCEDURE — G8510 SCR DEP NEG, NO PLAN REQD: HCPCS | Performed by: FAMILY MEDICINE

## 2022-01-12 NOTE — PROGRESS NOTES
Progress Note    he is a 66y.o. year old male who presents for evalution. Subjective:     Pt here for INR follow up and is currently therapeutic. He does have an IVC filter and has an appt with IR next week at [facility] to discuss the filter. Pt is at peace with leonora AC. He has continued pain in the right side of his abdomen. He is requesting to have imaging for this if she feels like something there. Reviewed PmHx, RxHx, FmHx, SocHx, AllgHx and updated and dated in the chart. Review of Systems - negative except as listed above in the HPI    Objective:     Vitals:    01/12/22 1325   BP: (!) 161/90   Pulse: 71   Resp: 16   Temp: 97.8 °F (36.6 °C)   TempSrc: Oral   SpO2: 94%   Weight: 147 lb (66.7 kg)   Height: 5' 8\" (1.727 m)       Current Outpatient Medications   Medication Sig    warfarin (COUMADIN) 5 mg tablet Take 7.5mg Saturday and 5mg all other days    gabapentin (NEURONTIN) 100 mg capsule Take 1 Capsule by mouth three (3) times daily. Max Daily Amount: 300 mg.    albuterol (PROVENTIL HFA, VENTOLIN HFA, PROAIR HFA) 90 mcg/actuation inhaler Take 2 Puffs by inhalation every four (4) hours as needed for Wheezing.  losartan (COZAAR) 100 mg tablet Take 1 Tablet by mouth daily.  multivitamin (ONE A DAY) tablet Take 1 Tab by mouth daily.  pantoprazole (PROTONIX) 40 mg tablet Take 40 mg by mouth daily.  levothyroxine (LEVOXYL) 175 mcg tablet Take 175 mcg by mouth Daily (before breakfast).  tiotropium (Spiriva with HandiHaler) 18 mcg inhalation capsule Take 1 Capsule by inhalation daily. (Patient not taking: Reported on 1/11/2022)     No current facility-administered medications for this visit.        Physical Examination: General appearance - alert, well appearing, and in no distress  Chest - clear to auscultation, no wheezes, rales or rhonchi, symmetric air entry  Heart - normal rate, regular rhythm, normal S1, S2, no murmurs, rubs, clicks or gallops  Abdomen -mild tenderness noted periumbilical to right no palpable masses. Assessment/ Plan:   Diagnoses and all orders for this visit:    1. Encounter for monitoring Coumadin therapy  -     AMB POC PT/INR  At goal no changes recheck 1 month  2. Centrilobular emphysema (HCC)  Stable non-smoking  3. Other acute pulmonary embolism with acute cor pulmonale (HCC)  Lifelong anticoagulation on Coumadin discussed we could switch back to Eliquis if the price drops at this point for goal for him. We will be seeing interventional radiology to discuss his filter next week  4. Generalized abdominal pain  -     CT ABD PELV W WO CONT; Future       Follow-up and Dispositions    · Return in about 4 weeks (around 2/9/2022), or if symptoms worsen or fail to improve. I have discussed the diagnosis with the patient and the intended plan as seen in the above orders. The patient has received an after-visit summary and questions were answered concerning future plans. Pt conveyed understanding of plan.     Medication Side Effects and Warnings were discussed with patient    An electronic signature was used to authenticate this note  Standard Letcher, DO

## 2022-01-12 NOTE — PROGRESS NOTES
Chief Complaint   Patient presents with    Follow-up     INR check     Patient presents in office today for INR check. Still has c/o LRQ pain. States that it feels like there is a lump. States that he is not happy with his visit with hematology yesterday  No other concerns. 1. Have you been to the ER, urgent care clinic since your last visit? Hospitalized since your last visit? No    2. Have you seen or consulted any other health care providers outside of the [address] [place] [address] since your last visit? Include any pap smears or colon screening.  No    Learning Assessment 2/1/2018   PRIMARY LEARNER Patient   HIGHEST LEVEL OF EDUCATION - PRIMARY LEARNER  GRADUATED HIGH SCHOOL OR GED   BARRIERS PRIMARY LEARNER NONE   CO-LEARNER CAREGIVER No   PRIMARY LANGUAGE ENGLISH   LEARNER PREFERENCE PRIMARY DEMONSTRATION   ANSWERED BY patient   RELATIONSHIP SELF

## 2022-01-12 NOTE — PATIENT INSTRUCTIONS
A Healthy Lifestyle: Care Instructions  Your Care Instructions     A healthy lifestyle can help you feel good, stay at a healthy weight, and have plenty of energy for both work and play. A healthy lifestyle is something you can share with your whole family. A healthy lifestyle also can lower your risk for serious health problems, such as high blood pressure, heart disease, and diabetes. You can follow a few steps listed below to improve your health and the health of your family. Follow-up care is a key part of your treatment and safety. Be sure to make and go to all appointments, and call your doctor if you are having problems. It's also a good idea to know your test results and keep a list of the medicines you take. How can you care for yourself at home? · Do not eat too much sugar, fat, or fast foods. You can still have dessert and treats now and then. The goal is moderation. · Start small to improve your eating habits. Pay attention to portion sizes, drink less juice and soda pop, and eat more fruits and vegetables. ? Eat a healthy amount of food. A 3-ounce serving of meat, for example, is about the size of a deck of cards. Fill the rest of your plate with vegetables and whole grains. ? Limit the amount of soda and sports drinks you have every day. Drink more water when you are thirsty. ? Eat plenty of fruits and vegetables every day. Have an apple or some carrot sticks as an afternoon snack instead of a candy bar. Try to have fruits and/or vegetables at every meal.  · Make exercise part of your daily routine. You may want to start with simple activities, such as walking, bicycling, or slow swimming. Try to be active 30 to 60 minutes every day. You do not need to do all 30 to 60 minutes all at once. For example, you can exercise 3 times a day for 10 or 20 minutes.  Moderate exercise is safe for most people, but it is always a good idea to talk to your doctor before starting an exercise program.  · Keep moving. [place] the lawn, work in the garden, or [employer]. Take the stairs instead of the elevator at work. · If you smoke, quit. People who smoke have an increased risk for heart attack, stroke, cancer, and other lung illnesses. Quitting is hard, but there are ways to boost your chance of quitting tobacco for good. ? Use nicotine gum, patches, or lozenges. ? Ask your doctor about stop-smoking programs and medicines. ? Keep trying. In addition to reducing your risk of diseases in the future, you will notice some benefits soon after you stop using tobacco. If you have shortness of breath or asthma symptoms, they will likely get better within a few weeks after you quit. · Limit how much alcohol you drink. Moderate amounts of alcohol (up to 2 drinks a day for men, 1 drink a day for women) are okay. But drinking too much can lead to liver problems, high blood pressure, and other health problems. Family health  If you have a family, there are many things you can do together to improve your health. · Eat meals together as a family as often as possible. · Eat healthy foods. This includes fruits, vegetables, lean meats and dairy, and whole grains. · Include your family in your fitness plan. Most people think of activities such as jogging or tennis as the way to fitness, but there are many ways you and your family can be more active. Anything that makes you breathe hard and gets your heart pumping is exercise. Here are some tips:  ? Walk to do errands or to take your child to school or the bus.  ? Go for a family bike ride after dinner instead of watching TV. Where can you learn more? Go to http://www.gray.com/  Enter A825 in the search box to learn more about \"A Healthy Lifestyle: Care Instructions. \"  Current as of: June 16, 2021               Content Version: 13.0  © 1424-6228 Healthwise, Incorporated.    Care instructions adapted under license by [employer] (which disclaims liability or warranty for this information). If you have questions about a medical condition or this instruction, always ask your healthcare professional. [address] any warranty or liability for your use of this information.

## 2022-01-19 DIAGNOSIS — G62.9 NEUROPATHY: ICD-10-CM

## 2022-01-19 RX ORDER — GABAPENTIN 100 MG/1
100 CAPSULE ORAL 3 TIMES DAILY
Qty: 90 CAPSULE | Refills: 5 | Status: SHIPPED | OUTPATIENT
Start: 2022-01-19 | End: 2022-08-02

## 2022-01-20 ENCOUNTER — HOSPITAL ENCOUNTER (OUTPATIENT)
Dept: CT IMAGING | Age: 72
Discharge: HOME | End: 2022-01-20
Attending: FAMILY MEDICINE
Payer: MEDICARE

## 2022-01-20 DIAGNOSIS — R10.84 GENERALIZED ABDOMINAL PAIN: ICD-10-CM

## 2022-01-20 LAB — CREAT BLD-MCNC: 1.4 MG/DL (ref 0.6–1.3)

## 2022-01-20 PROCEDURE — 74011000636 HC RX REV CODE- 636: Performed by: RADIOLOGY

## 2022-01-20 PROCEDURE — 82565 ASSAY OF CREATININE: CPT

## 2022-01-20 PROCEDURE — 74177 CT ABD & PELVIS W/CONTRAST: CPT

## 2022-01-20 RX ADMIN — IOPAMIDOL 100 ML: 755 INJECTION, SOLUTION INTRAVENOUS at 13:10

## 2022-01-20 NOTE — PROGRESS NOTES
The only thing that the studies showing is that you are having significant constipation. Perhaps start a bowel regimen of MiraLAX 1-2 times daily.

## 2022-01-26 NOTE — PROGRESS NOTES
Barbie Viramontes is a 70 y.o. male   Chief Complaint   Patient presents with    Follow-up    Labs    pt here for recheck of his iron levels and is taking iron qday and states he is continuing with constipation. Pt is using an otc laxative and discussed adding on miralax and pt will do this. Will give miralax samples. Pt had improved from 9.8 to 12. 2.      he is a 70y.o. year old male who presents for evalution. Reviewed PmHx, RxHx, FmHx, SocHx, AllgHx and updated and dated in the chart. Review of Systems - negative except as listed above in the HPI    Objective:     Vitals:    05/21/19 0928 05/21/19 0940   BP: (!) 144/91 (!) 137/93   Pulse: 74    Resp: 16    Temp: 97.8 °F (36.6 °C)    TempSrc: Oral    SpO2: 94%    Weight: 161 lb (73 kg)    Height: 5' 8\" (1.727 m)        Current Outpatient Medications   Medication Sig    citalopram (CELEXA) 40 mg tablet Take 1 Tab by mouth daily.  ferrous sulfate 325 mg (65 mg iron) tablet 1 tab PO bid with food    losartan-hydroCHLOROthiazide (HYZAAR) 100-12.5 mg per tablet Take 1 Tab by mouth daily.  omeprazole (PRILOSEC) 20 mg capsule Take 20 mg by mouth daily.  raNITIdine (ZANTAC) 150 mg tablet Take 150 mg by mouth two (2) times a day.  levocetirizine (XYZAL) 5 mg tablet TAKE ONE TABLET BY MOUTH ONCE DAILY    levothyroxine (LEVOXYL) 175 mcg tablet Take 175 mcg by mouth Daily (before breakfast).  fluticasone (FLONASE) 50 mcg/actuation nasal spray 2 Sprays by Both Nostrils route daily. Administer to right and left nostrils. No current facility-administered medications for this visit. Physical Examination: General appearance - alert, well appearing, and in no distress  Chest - clear to auscultation, no wheezes, rales or rhonchi, symmetric air entry  Heart - normal rate, regular rhythm, normal S1, S2, no murmurs, rubs, clicks or gallops      Assessment/ Plan:   Diagnoses and all orders for this visit:    1.  Anemia, unspecified type  -     CBC WITH AUTOMATED DIFF  -     IRON PROFILE  -     FERRITIN     given miralax samples  Follow-up and Dispositions    · Return if symptoms worsen or fail to improve. I have discussed the diagnosis with the patient and the intended plan as seen in the above orders. The patient has received an after-visit summary and questions were answered concerning future plans. Pt conveyed understanding of plan.     Medication Side Effects and Warnings were discussed with patient      Justus Nieves, DO Xolair Counseling:  Patient informed of potential adverse effects including but not limited to fever, muscle aches, rash and allergic reactions.  The patient verbalized understanding of the proper use and possible adverse effects of Xolair.  All of the patient's questions and concerns were addressed.

## 2022-02-08 DIAGNOSIS — I10 ESSENTIAL HYPERTENSION: ICD-10-CM

## 2022-02-08 RX ORDER — LOSARTAN POTASSIUM 100 MG/1
100 TABLET ORAL DAILY
Qty: 90 TABLET | Refills: 4 | Status: SHIPPED | OUTPATIENT
Start: 2022-02-08

## 2022-02-16 ENCOUNTER — OFFICE VISIT (OUTPATIENT)
Dept: FAMILY MEDICINE CLINIC | Age: 72
End: 2022-02-16
Payer: MEDICARE

## 2022-02-16 VITALS
OXYGEN SATURATION: 96 % | TEMPERATURE: 97.6 F | RESPIRATION RATE: 16 BRPM | SYSTOLIC BLOOD PRESSURE: 156 MMHG | HEIGHT: 68 IN | HEART RATE: 75 BPM | BODY MASS INDEX: 22.28 KG/M2 | WEIGHT: 147 LBS | DIASTOLIC BLOOD PRESSURE: 96 MMHG

## 2022-02-16 DIAGNOSIS — Z79.01 ENCOUNTER FOR MONITORING COUMADIN THERAPY: Primary | ICD-10-CM

## 2022-02-16 DIAGNOSIS — Z51.81 ENCOUNTER FOR MONITORING COUMADIN THERAPY: Primary | ICD-10-CM

## 2022-02-16 DIAGNOSIS — K40.90 NON-RECURRENT UNILATERAL INGUINAL HERNIA WITHOUT OBSTRUCTION OR GANGRENE: ICD-10-CM

## 2022-02-16 LAB
INR BLD: 2.6
PT POC: 31.6 SECONDS
VALID INTERNAL CONTROL?: YES

## 2022-02-16 PROCEDURE — 3017F COLORECTAL CA SCREEN DOC REV: CPT | Performed by: FAMILY MEDICINE

## 2022-02-16 PROCEDURE — 99214 OFFICE O/P EST MOD 30 MIN: CPT | Performed by: FAMILY MEDICINE

## 2022-02-16 PROCEDURE — G8427 DOCREV CUR MEDS BY ELIG CLIN: HCPCS | Performed by: FAMILY MEDICINE

## 2022-02-16 PROCEDURE — G8753 SYS BP > OR = 140: HCPCS | Performed by: FAMILY MEDICINE

## 2022-02-16 PROCEDURE — G0463 HOSPITAL OUTPT CLINIC VISIT: HCPCS | Performed by: FAMILY MEDICINE

## 2022-02-16 PROCEDURE — 85610 PROTHROMBIN TIME: CPT | Performed by: FAMILY MEDICINE

## 2022-02-16 PROCEDURE — 1101F PT FALLS ASSESS-DOCD LE1/YR: CPT | Performed by: FAMILY MEDICINE

## 2022-02-16 PROCEDURE — G8420 CALC BMI NORM PARAMETERS: HCPCS | Performed by: FAMILY MEDICINE

## 2022-02-16 PROCEDURE — G8510 SCR DEP NEG, NO PLAN REQD: HCPCS | Performed by: FAMILY MEDICINE

## 2022-02-16 PROCEDURE — G8755 DIAS BP > OR = 90: HCPCS | Performed by: FAMILY MEDICINE

## 2022-02-16 PROCEDURE — G8536 NO DOC ELDER MAL SCRN: HCPCS | Performed by: FAMILY MEDICINE

## 2022-02-16 NOTE — PROGRESS NOTES
Progress Note    he is a 66y.o. year old male who presents for evalution. Subjective:     Patient here for to follow-up for pulmonary embolism. Goal INR 2-3. Currently at 2.6. No issues with bleeding. Taking 7.5 mg Saturdays 5 mg. Planning for filter removal by IR in April    Patient also reports a bulge in his lower abdomen on the left side which he needs 2 weeks ago after coughing. States he has been coughing quite a bit. Pt is not having severe pain. Had prostate MRI recently and he is getting results on 22nd and will get me a copy to see if it shows hernia. BP is fine at home  Reviewed PmHx, RxHx, FmHx, SocHx, AllgHx and updated and dated in the chart. Review of Systems - negative except as listed above in the HPI    Objective:     Vitals:    02/16/22 0812   BP: (!) 156/96   Pulse: 75   Resp: 16   Temp: 97.6 °F (36.4 °C)   TempSrc: Oral   SpO2: 96%   Weight: 147 lb (66.7 kg)   Height: 5' 8\" (1.727 m)       Current Outpatient Medications   Medication Sig    losartan (COZAAR) 100 mg tablet Take 1 Tablet by mouth daily.  gabapentin (NEURONTIN) 100 mg capsule Take 1 Capsule by mouth three (3) times daily. Max Daily Amount: 300 mg.  warfarin (COUMADIN) 5 mg tablet Take 7.5mg Saturday and 5mg all other days    albuterol (PROVENTIL HFA, VENTOLIN HFA, PROAIR HFA) 90 mcg/actuation inhaler Take 2 Puffs by inhalation every four (4) hours as needed for Wheezing.  multivitamin (ONE A DAY) tablet Take 1 Tab by mouth daily.  pantoprazole (PROTONIX) 40 mg tablet Take 40 mg by mouth daily.  levothyroxine (LEVOXYL) 175 mcg tablet Take 175 mcg by mouth Daily (before breakfast).  tiotropium (Spiriva with HandiHaler) 18 mcg inhalation capsule Take 1 Capsule by inhalation daily. (Patient not taking: Reported on 1/11/2022)     No current facility-administered medications for this visit.        Physical Examination: General appearance - alert, well appearing, and in no distress  Abdomen - HERNIA EXAM: left inguinal hernia      Assessment/ Plan:   Diagnoses and all orders for this visit:    1. Encounter for monitoring Coumadin therapy  -     AMB POC PT/INR  At goal  2. Non-recurrent unilateral inguinal hernia without obstruction or gangrene  Pt jacqueline get me copy of MRI report he just had done Friday, discussed would need to hold off on hernia repair given on coumadin from recent COOKIE and actively coughing as well since. If severe unrelenting pain then ED would be appropriate. Once report back will refer to surgery and if needed further imaging. Follow-up and Dispositions    · Return in about 4 weeks (around 3/16/2022), or if symptoms worsen or fail to improve, for INR check. I have discussed the diagnosis with the patient and the intended plan as seen in the above orders. The patient has received an after-visit summary and questions were answered concerning future plans. Pt conveyed understanding of plan.     Medication Side Effects and Warnings were discussed with patient    An electronic signature was used to authenticate this note  Mynor Bautista,

## 2022-02-16 NOTE — PATIENT INSTRUCTIONS
A Healthy Lifestyle: Care Instructions  Your Care Instructions     A healthy lifestyle can help you feel good, stay at a healthy weight, and have plenty of energy for both work and play. A healthy lifestyle is something you can share with your whole family. A healthy lifestyle also can lower your risk for serious health problems, such as high blood pressure, heart disease, and diabetes. You can follow a few steps listed below to improve your health and the health of your family. Follow-up care is a key part of your treatment and safety. Be sure to make and go to all appointments, and call your doctor if you are having problems. It's also a good idea to know your test results and keep a list of the medicines you take. How can you care for yourself at home? · Do not eat too much sugar, fat, or fast foods. You can still have dessert and treats now and then. The goal is moderation. · Start small to improve your eating habits. Pay attention to portion sizes, drink less juice and soda pop, and eat more fruits and vegetables. ? Eat a healthy amount of food. A 3-ounce serving of meat, for example, is about the size of a deck of cards. Fill the rest of your plate with vegetables and whole grains. ? Limit the amount of soda and sports drinks you have every day. Drink more water when you are thirsty. ? Eat plenty of fruits and vegetables every day. Have an apple or some carrot sticks as an afternoon snack instead of a candy bar. Try to have fruits and/or vegetables at every meal.  · Make exercise part of your daily routine. You may want to start with simple activities, such as walking, bicycling, or slow swimming. Try to be active 30 to 60 minutes every day. You do not need to do all 30 to 60 minutes all at once. For example, you can exercise 3 times a day for 10 or 20 minutes.  Moderate exercise is safe for most people, but it is always a good idea to talk to your doctor before starting an exercise program.  · Keep moving. Luis Enrique Satchel the lawn, work in the garden, or [employer]. Take the stairs instead of the elevator at work. · If you smoke, quit. People who smoke have an increased risk for heart attack, stroke, cancer, and other lung illnesses. Quitting is hard, but there are ways to boost your chance of quitting tobacco for good. ? Use nicotine gum, patches, or lozenges. ? Ask your doctor about stop-smoking programs and medicines. ? Keep trying. In addition to reducing your risk of diseases in the future, you will notice some benefits soon after you stop using tobacco. If you have shortness of breath or asthma symptoms, they will likely get better within a few weeks after you quit. · Limit how much alcohol you drink. Moderate amounts of alcohol (up to 2 drinks a day for men, 1 drink a day for women) are okay. But drinking too much can lead to liver problems, high blood pressure, and other health problems. Family health  If you have a family, there are many things you can do together to improve your health. · Eat meals together as a family as often as possible. · Eat healthy foods. This includes fruits, vegetables, lean meats and dairy, and whole grains. · Include your family in your fitness plan. Most people think of activities such as jogging or tennis as the way to fitness, but there are many ways you and your family can be more active. Anything that makes you breathe hard and gets your heart pumping is exercise. Here are some tips:  ? Walk to do errands or to take your child to school or the bus.  ? Go for a family bike ride after dinner instead of watching TV. Where can you learn more? Go to http://www.gray.com/  Enter Q370 in the search box to learn more about \"A Healthy Lifestyle: Care Instructions. \"  Current as of: June 16, 2021               Content Version: 13.0  © 2651-1607 Healthwise, Incorporated.    Care instructions adapted under license by [employer] (which disclaims liability or warranty for this information). If you have questions about a medical condition or this instruction, always ask your healthcare professional. [address] any warranty or liability for your use of this information.

## 2022-02-16 NOTE — PROGRESS NOTES
Chief Complaint   Patient presents with    Follow-up     INR     Patient presents in office today for INR check. Has c/o a possible hernia. States that he has a bulge in his lower abdomen on the left side. No other concerns. 1. Have you been to the ER, urgent care clinic since your last visit? Hospitalized since your last visit? No    2. Have you seen or consulted any other health care providers outside of the [address] [place] [address] since your last visit? Include any pap smears or colon screening.  No    Learning Assessment 2/1/2018   PRIMARY LEARNER Patient   HIGHEST LEVEL OF EDUCATION - PRIMARY LEARNER  GRADUATED HIGH SCHOOL OR GED   BARRIERS PRIMARY LEARNER NONE   CO-LEARNER CAREGIVER No   PRIMARY LANGUAGE ENGLISH   LEARNER PREFERENCE PRIMARY DEMONSTRATION   ANSWERED BY patient   RELATIONSHIP SELF

## 2022-02-25 ENCOUNTER — DOCUMENTATION ONLY (OUTPATIENT)
Dept: FAMILY MEDICINE CLINIC | Age: 72
End: 2022-02-25

## 2022-02-25 NOTE — PROGRESS NOTES
Patient dropped off prostate MRI report and patient surgery information for robotic repair of left inguinal hernia possible right that is scheduled for 3/2/2022. These forms were placed in the box up front. The patient stated they would like him to stop his Coumadin medication by this Saturday 02/26/2022 and he can restart a few days after his surgery. He can be reached at [phone] or through my chart.

## 2022-03-04 ENCOUNTER — DOCUMENTATION ONLY (OUTPATIENT)
Dept: FAMILY MEDICINE CLINIC | Age: 72
End: 2022-03-04

## 2022-03-07 ENCOUNTER — DOCUMENTATION ONLY (OUTPATIENT)
Dept: FAMILY MEDICINE CLINIC | Age: 72
End: 2022-03-07

## 2022-03-07 NOTE — PROGRESS NOTES
Pre-op medication management plan faxed to [facility]. Fax number [phone] confirmation number [phone].

## 2022-03-16 ENCOUNTER — OFFICE VISIT (OUTPATIENT)
Dept: FAMILY MEDICINE CLINIC | Age: 72
End: 2022-03-16
Payer: MEDICARE

## 2022-03-16 VITALS
DIASTOLIC BLOOD PRESSURE: 79 MMHG | WEIGHT: 147 LBS | TEMPERATURE: 97.7 F | HEART RATE: 68 BPM | HEIGHT: 68 IN | OXYGEN SATURATION: 96 % | SYSTOLIC BLOOD PRESSURE: 134 MMHG | BODY MASS INDEX: 22.28 KG/M2 | RESPIRATION RATE: 16 BRPM

## 2022-03-16 DIAGNOSIS — Z51.81 ENCOUNTER FOR MONITORING COUMADIN THERAPY: Primary | ICD-10-CM

## 2022-03-16 DIAGNOSIS — Z79.01 ENCOUNTER FOR MONITORING COUMADIN THERAPY: Primary | ICD-10-CM

## 2022-03-16 LAB
INR BLD: 1.6
PT POC: 18.7 SECONDS
VALID INTERNAL CONTROL?: YES

## 2022-03-16 PROCEDURE — 85610 PROTHROMBIN TIME: CPT | Performed by: FAMILY MEDICINE

## 2022-03-16 PROCEDURE — G0463 HOSPITAL OUTPT CLINIC VISIT: HCPCS | Performed by: FAMILY MEDICINE

## 2022-03-16 PROCEDURE — 99213 OFFICE O/P EST LOW 20 MIN: CPT | Performed by: FAMILY MEDICINE

## 2022-03-16 NOTE — PATIENT INSTRUCTIONS
A Healthy Lifestyle: Care Instructions  Your Care Instructions     A healthy lifestyle can help you feel good, stay at a healthy weight, and have plenty of energy for both work and play. A healthy lifestyle is something you can share with your whole family. A healthy lifestyle also can lower your risk for serious health problems, such as high blood pressure, heart disease, and diabetes. You can follow a few steps listed below to improve your health and the health of your family. Follow-up care is a key part of your treatment and safety. Be sure to make and go to all appointments, and call your doctor if you are having problems. It's also a good idea to know your test results and keep a list of the medicines you take. How can you care for yourself at home? · Do not eat too much sugar, fat, or fast foods. You can still have dessert and treats now and then. The goal is moderation. · Start small to improve your eating habits. Pay attention to portion sizes, drink less juice and soda pop, and eat more fruits and vegetables. ? Eat a healthy amount of food. A 3-ounce serving of meat, for example, is about the size of a deck of cards. Fill the rest of your plate with vegetables and whole grains. ? Limit the amount of soda and sports drinks you have every day. Drink more water when you are thirsty. ? Eat plenty of fruits and vegetables every day. Have an apple or some carrot sticks as an afternoon snack instead of a candy bar. Try to have fruits and/or vegetables at every meal.  · Make exercise part of your daily routine. You may want to start with simple activities, such as walking, bicycling, or slow swimming. Try to be active 30 to 60 minutes every day. You do not need to do all 30 to 60 minutes all at once. For example, you can exercise 3 times a day for 10 or 20 minutes.  Moderate exercise is safe for most people, but it is always a good idea to talk to your doctor before starting an exercise program.  · Keep moving. Tiffanie Boyd the lawn, work in the garden, or [employer]. Take the stairs instead of the elevator at work. · If you smoke, quit. People who smoke have an increased risk for heart attack, stroke, cancer, and other lung illnesses. Quitting is hard, but there are ways to boost your chance of quitting tobacco for good. ? Use nicotine gum, patches, or lozenges. ? Ask your doctor about stop-smoking programs and medicines. ? Keep trying. In addition to reducing your risk of diseases in the future, you will notice some benefits soon after you stop using tobacco. If you have shortness of breath or asthma symptoms, they will likely get better within a few weeks after you quit. · Limit how much alcohol you drink. Moderate amounts of alcohol (up to 2 drinks a day for men, 1 drink a day for women) are okay. But drinking too much can lead to liver problems, high blood pressure, and other health problems. Family health  If you have a family, there are many things you can do together to improve your health. · Eat meals together as a family as often as possible. · Eat healthy foods. This includes fruits, vegetables, lean meats and dairy, and whole grains. · Include your family in your fitness plan. Most people think of activities such as jogging or tennis as the way to fitness, but there are many ways you and your family can be more active. Anything that makes you breathe hard and gets your heart pumping is exercise. Here are some tips:  ? Walk to do errands or to take your child to school or the bus.  ? Go for a family bike ride after dinner instead of watching TV. Where can you learn more? Go to http://www.gray.com/  Enter O334 in the search box to learn more about \"A Healthy Lifestyle: Care Instructions. \"  Current as of: June 16, 2021               Content Version: 13.2  © 1274-4508 Healthwise, Incorporated.    Care instructions adapted under license by [employer] (which disclaims liability or warranty for this information). If you have questions about a medical condition or this instruction, always ask your healthcare professional. [address] any warranty or liability for your use of this information.

## 2022-03-16 NOTE — PROGRESS NOTES
Chief Complaint   Patient presents with    Follow-up     INR     Patient presents in office today for INR check. No concerns. 1. Have you been to the ER, urgent care clinic since your last visit? Hospitalized since your last visit? No    2. Have you seen or consulted any other health care providers outside of the [address] [place] [address] since your last visit? Include any pap smears or colon screening.  No    Learning Assessment 2/1/2018   PRIMARY LEARNER Patient   HIGHEST LEVEL OF EDUCATION - PRIMARY LEARNER  GRADUATED HIGH SCHOOL OR GED   BARRIERS PRIMARY LEARNER NONE   CO-LEARNER CAREGIVER No   PRIMARY LANGUAGE ENGLISH   LEARNER PREFERENCE PRIMARY DEMONSTRATION   ANSWERED BY patient   RELATIONSHIP SELF

## 2022-03-16 NOTE — PROGRESS NOTES
Progress Note    he is a 66y.o. year old male who presents for evalution. Subjective:     Pt here for INR check, had hernia surgery and restarted on 3/4/2022. Has a prostate biopsy coming up too on 31st and will need to stop coumadin a few days prior. Reviewed PmHx, RxHx, FmHx, SocHx, AllgHx and updated and dated in the chart. Review of Systems - negative except as listed above in the HPI    Objective:     Vitals:    03/16/22 1007   BP: 134/79   Pulse: 68   Resp: 16   Temp: 97.7 °F (36.5 °C)   TempSrc: Oral   SpO2: 96%   Weight: 147 lb (66.7 kg)   Height: 5' 8\" (1.727 m)       Current Outpatient Medications   Medication Sig    losartan (COZAAR) 100 mg tablet Take 1 Tablet by mouth daily.  gabapentin (NEURONTIN) 100 mg capsule Take 1 Capsule by mouth three (3) times daily. Max Daily Amount: 300 mg.  warfarin (COUMADIN) 5 mg tablet Take 7.5mg Saturday and 5mg all other days    albuterol (PROVENTIL HFA, VENTOLIN HFA, PROAIR HFA) 90 mcg/actuation inhaler Take 2 Puffs by inhalation every four (4) hours as needed for Wheezing.  multivitamin (ONE A DAY) tablet Take 1 Tab by mouth daily.  pantoprazole (PROTONIX) 40 mg tablet Take 40 mg by mouth daily.  levothyroxine (LEVOXYL) 175 mcg tablet Take 175 mcg by mouth Daily (before breakfast).  tiotropium (Spiriva with HandiHaler) 18 mcg inhalation capsule Take 1 Capsule by inhalation daily. (Patient not taking: Reported on 1/11/2022)     No current facility-administered medications for this visit. Physical Examination: General appearance - alert, well appearing, and in no distress  Mental status - alert, oriented to person, place, and time      Assessment/ Plan:   Diagnoses and all orders for this visit:    1. Encounter for monitoring Coumadin therapy  -     AMB POC PT/INR  1.6 today take an extra 5mg. He will be stopping again soon for prostate biopsy then restarting April 1 or 2. He will f/u with me on April 14th. Follow-up and Dispositions    · Return in about 4 weeks (around 4/13/2022), or if symptoms worsen or fail to improve. I have discussed the diagnosis with the patient and the intended plan as seen in the above orders. The patient has received an after-visit summary and questions were answered concerning future plans. Pt conveyed understanding of plan.     Medication Side Effects and Warnings were discussed with patient    An electronic signature was used to authenticate this note  Beth Juarez, DO

## 2022-03-17 ENCOUNTER — TELEPHONE (OUTPATIENT)
Dept: FAMILY MEDICINE CLINIC | Age: 72
End: 2022-03-17

## 2022-03-17 NOTE — TELEPHONE ENCOUNTER
[facility]- would like to speak with nurse concerning an EKG.  [facility] phone: [phone] Fax: [phone]

## 2022-03-17 NOTE — TELEPHONE ENCOUNTER
When calling [facility] they gave her [facility] ext [phone]. Called and LVM for Jos Meléndez to call the office back.

## 2022-03-18 ENCOUNTER — TRANSCRIBE ORDER (OUTPATIENT)
Dept: SCHEDULING | Age: 72
End: 2022-03-18

## 2022-03-18 DIAGNOSIS — C34.11 CANCER OF UPPER LOBE OF RIGHT LUNG (HCC): Primary | ICD-10-CM

## 2022-03-19 PROBLEM — D68.59 HYPERCOAGULABLE STATE (HCC): Status: ACTIVE | Noted: 2022-01-11

## 2022-03-19 PROBLEM — Z95.828 PRESENCE OF IVC FILTER: Status: ACTIVE | Noted: 2022-01-11

## 2022-03-20 PROBLEM — I26.99 BILATERAL PULMONARY EMBOLISM (HCC): Status: ACTIVE | Noted: 2022-01-11

## 2022-03-20 PROBLEM — Z79.899 HIGH RISK MEDICATION USE: Status: ACTIVE | Noted: 2022-01-11

## 2022-04-14 ENCOUNTER — OFFICE VISIT (OUTPATIENT)
Dept: FAMILY MEDICINE CLINIC | Age: 72
End: 2022-04-14
Payer: MEDICARE

## 2022-04-14 VITALS
HEIGHT: 68 IN | TEMPERATURE: 98 F | OXYGEN SATURATION: 96 % | DIASTOLIC BLOOD PRESSURE: 90 MMHG | SYSTOLIC BLOOD PRESSURE: 151 MMHG | HEART RATE: 69 BPM | BODY MASS INDEX: 22.58 KG/M2 | WEIGHT: 149 LBS | RESPIRATION RATE: 18 BRPM

## 2022-04-14 DIAGNOSIS — Z79.01 ENCOUNTER FOR MONITORING COUMADIN THERAPY: Primary | ICD-10-CM

## 2022-04-14 DIAGNOSIS — Z51.81 ENCOUNTER FOR MONITORING COUMADIN THERAPY: Primary | ICD-10-CM

## 2022-04-14 DIAGNOSIS — Z00.00 MEDICARE ANNUAL WELLNESS VISIT, SUBSEQUENT: ICD-10-CM

## 2022-04-14 LAB
INR BLD: 1.8
PT POC: 21.3 SECONDS
VALID INTERNAL CONTROL?: YES

## 2022-04-14 PROCEDURE — G0463 HOSPITAL OUTPT CLINIC VISIT: HCPCS | Performed by: FAMILY MEDICINE

## 2022-04-14 PROCEDURE — 85610 PROTHROMBIN TIME: CPT | Performed by: FAMILY MEDICINE

## 2022-04-14 PROCEDURE — 1101F PT FALLS ASSESS-DOCD LE1/YR: CPT | Performed by: FAMILY MEDICINE

## 2022-04-14 PROCEDURE — G8510 SCR DEP NEG, NO PLAN REQD: HCPCS | Performed by: FAMILY MEDICINE

## 2022-04-14 PROCEDURE — G8536 NO DOC ELDER MAL SCRN: HCPCS | Performed by: FAMILY MEDICINE

## 2022-04-14 PROCEDURE — G8420 CALC BMI NORM PARAMETERS: HCPCS | Performed by: FAMILY MEDICINE

## 2022-04-14 PROCEDURE — G0439 PPPS, SUBSEQ VISIT: HCPCS | Performed by: FAMILY MEDICINE

## 2022-04-14 PROCEDURE — 3017F COLORECTAL CA SCREEN DOC REV: CPT | Performed by: FAMILY MEDICINE

## 2022-04-14 PROCEDURE — 99213 OFFICE O/P EST LOW 20 MIN: CPT | Performed by: FAMILY MEDICINE

## 2022-04-14 PROCEDURE — G8753 SYS BP > OR = 140: HCPCS | Performed by: FAMILY MEDICINE

## 2022-04-14 PROCEDURE — G8427 DOCREV CUR MEDS BY ELIG CLIN: HCPCS | Performed by: FAMILY MEDICINE

## 2022-04-14 PROCEDURE — G8755 DIAS BP > OR = 90: HCPCS | Performed by: FAMILY MEDICINE

## 2022-04-14 NOTE — PROGRESS NOTES
Progress Note    he is a 66y.o. year old male who presents for evalution. Subjective:     Pt had his prostate biopsy and has restarted coumadin and has been on his regular since last Tuesday. Slightly low today at 1.8. Will take an extra dose today to help bring it up. Planning for filter removal end of May. Reviewed PmHx, RxHx, FmHx, SocHx, AllgHx and updated and dated in the chart. Review of Systems - negative except as listed above in the HPI    Objective:     Vitals:    04/14/22 0940   BP: (!) 151/90   Pulse: 69   Resp: 18   Temp: 98 °F (36.7 °C)   TempSrc: Oral   SpO2: 96%   Weight: 149 lb (67.6 kg)   Height: 5' 8\" (1.727 m)       Current Outpatient Medications   Medication Sig    losartan (COZAAR) 100 mg tablet Take 1 Tablet by mouth daily.  gabapentin (NEURONTIN) 100 mg capsule Take 1 Capsule by mouth three (3) times daily. Max Daily Amount: 300 mg.  warfarin (COUMADIN) 5 mg tablet Take 7.5mg Saturday and 5mg all other days    albuterol (PROVENTIL HFA, VENTOLIN HFA, PROAIR HFA) 90 mcg/actuation inhaler Take 2 Puffs by inhalation every four (4) hours as needed for Wheezing.  multivitamin (ONE A DAY) tablet Take 1 Tab by mouth daily.  pantoprazole (PROTONIX) 40 mg tablet Take 40 mg by mouth daily.  levothyroxine (LEVOXYL) 175 mcg tablet Take 175 mcg by mouth Daily (before breakfast).  tiotropium (Spiriva with HandiHaler) 18 mcg inhalation capsule Take 1 Capsule by inhalation daily. (Patient not taking: Reported on 1/11/2022)     No current facility-administered medications for this visit. Physical Examination: General appearance - alert, well appearing, and in no distress  Mental status - alert, oriented to person, place, and time      Assessment/ Plan:   Diagnoses and all orders for this visit:    1. Encounter for monitoring Coumadin therapy  -     AMB POC PT/INR    2.  Medicare annual wellness visit, subsequent     take an extra 5mg todaythen resume usual schedule and recheck in 3 weeks. Follow-up and Dispositions    · Return in about 3 weeks (around 5/5/2022), or if symptoms worsen or fail to improve. I have discussed the diagnosis with the patient and the intended plan as seen in the above orders. The patient has received an after-visit summary and questions were answered concerning future plans. Pt conveyed understanding of plan. Medication Side Effects and Warnings were discussed with patient    An electronic signature was used to authenticate this note  Rosalva Paris, DO    This is the Subsequent Medicare Annual Wellness Exam, performed 12 months or more after the Initial AWV or the last Subsequent AWV    I have reviewed the patient's medical history in detail and updated the computerized patient record. Assessment/Plan   Education and counseling provided:  Are appropriate based on today's review and evaluation    1. Encounter for monitoring Coumadin therapy  -     AMB POC PT/INR  2.  Medicare annual wellness visit, subsequent       Depression Risk Factor Screening     3 most recent PHQ Screens 4/14/2022   PHQ Not Done -   Little interest or pleasure in doing things Not at all   Feeling down, depressed, irritable, or hopeless Not at all   Total Score PHQ 2 0   Trouble falling or staying asleep, or sleeping too much -   Feeling tired or having little energy -   Poor appetite, weight loss, or overeating -   Feeling bad about yourself - or that you are a failure or have let yourself or your family down -   Trouble concentrating on things such as school, work, reading, or watching TV -   Moving or speaking so slowly that other people could have noticed; or the opposite being so fidgety that others notice -   Thoughts of being better off dead, or hurting yourself in some way -   PHQ 9 Score -   How difficult have these problems made it for you to do your work, take care of your home and get along with others -       Alcohol & Drug Abuse Risk Screen    Do you average more than 1 drink per night or more than 7 drinks a week: Yes bottle of wine a night    In the past three months have you have had more than 4 drinks containing alcohol on one occasion: No          Functional Ability and Level of Safety    Hearing: Hearing is good. Activities of Daily Living: The home contains: no safety equipment. Patient does total self care      Ambulation: with no difficulty     Fall Risk:  Fall Risk Assessment, last 12 mths 1/11/2022   Able to walk? Yes   Fall in past 12 months? 0   Do you feel unsteady? 0   Are you worried about falling 0   Is TUG test greater than 12 seconds? (No Data)   Is the gait abnormal? 0   Number of falls in past 12 months -   Fall with injury? -      Abuse Screen:  Patient is not abused       Cognitive Screening    Has your family/caregiver stated any concerns about your memory: no         Health Maintenance Due     Health Maintenance Due   Topic Date Due    Shingrix Vaccine Age 49> (1 of 2) Never done       Patient Care Team   Patient Care Team:  John Garcia DO as PCP - General (Family Medicine)  John Garcia DO as PCP - [facility] Empaneled Provider    History     Patient Active Problem List   Diagnosis Code    Thyroid disease E07.9    Hypertension I10    Bilateral pulmonary embolism ([address] I26.99    Hypercoagulable state ([address] D68.59    Presence of IVC filter Z95.828    High risk medication use Z79.899    Malignant neoplasm of upper lobe of right lung (HCC) C34.11     Past Medical History:   Diagnosis Date    GERD (gastroesophageal reflux disease)     Hypertension     Thyroid disease       Past Surgical History:   Procedure Laterality Date    HX LIPOMA RESECTION      left shoulder    HX ORTHOPAEDIC      left shoulder     Current Outpatient Medications   Medication Sig Dispense Refill    losartan (COZAAR) 100 mg tablet Take 1 Tablet by mouth daily.  90 Tablet 4    gabapentin (NEURONTIN) 100 mg capsule Take 1 Capsule by mouth three (3) times daily. Max Daily Amount: 300 mg. 90 Capsule 5    warfarin (COUMADIN) 5 mg tablet Take 7.5mg Saturday and 5mg all other days 135 Tablet 1    albuterol (PROVENTIL HFA, VENTOLIN HFA, PROAIR HFA) 90 mcg/actuation inhaler Take 2 Puffs by inhalation every four (4) hours as needed for Wheezing. 18 g 5    multivitamin (ONE A DAY) tablet Take 1 Tab by mouth daily.  pantoprazole (PROTONIX) 40 mg tablet Take 40 mg by mouth daily.  levothyroxine (LEVOXYL) 175 mcg tablet Take 175 mcg by mouth Daily (before breakfast).  tiotropium (Spiriva with HandiHaler) 18 mcg inhalation capsule Take 1 Capsule by inhalation daily. (Patient not taking: Reported on 2022)       No Known Allergies    Family History   Problem Relation Age of Onset    Heart Disease Mother     Heart Attack Father      Social History     Tobacco Use    Smoking status: Former Smoker     Packs/day: 1.00     Years: 10.00     Pack years: 10.00     Quit date:      Years since quittin.2    Smokeless tobacco: Never Used   Substance Use Topics    Alcohol use:  Yes     Alcohol/week: 28.0 standard drinks     Types: 28 Glasses of wine per week     Comment: Bottle of wine per night          Shakir Eduardo DO

## 2022-04-14 NOTE — PROGRESS NOTES
Chief Complaint   Patient presents with    Follow-up     inr     Patient presents in office today for INR check. No concerns. 1. Have you been to the ER, urgent care clinic since your last visit? Hospitalized since your last visit? No    2. Have you seen or consulted any other health care providers outside of the [address] [place] [address] since your last visit? Include any pap smears or colon screening.  No    Learning Assessment 2/1/2018   PRIMARY LEARNER Patient   HIGHEST LEVEL OF EDUCATION - PRIMARY LEARNER  GRADUATED HIGH SCHOOL OR GED   BARRIERS PRIMARY LEARNER NONE   CO-LEARNER CAREGIVER No   PRIMARY LANGUAGE ENGLISH   LEARNER PREFERENCE PRIMARY DEMONSTRATION   ANSWERED BY patient   RELATIONSHIP SELF

## 2022-04-14 NOTE — PATIENT INSTRUCTIONS
A Healthy Lifestyle: Care Instructions  Your Care Instructions     A healthy lifestyle can help you feel good, stay at a healthy weight, and have plenty of energy for both work and play. A healthy lifestyle is something you can share with your whole family. A healthy lifestyle also can lower your risk for serious health problems, such as high blood pressure, heart disease, and diabetes. You can follow a few steps listed below to improve your health and the health of your family. Follow-up care is a key part of your treatment and safety. Be sure to make and go to all appointments, and call your doctor if you are having problems. It's also a good idea to know your test results and keep a list of the medicines you take. How can you care for yourself at home? · Do not eat too much sugar, fat, or fast foods. You can still have dessert and treats now and then. The goal is moderation. · Start small to improve your eating habits. Pay attention to portion sizes, drink less juice and soda pop, and eat more fruits and vegetables. ? Eat a healthy amount of food. A 3-ounce serving of meat, for example, is about the size of a deck of cards. Fill the rest of your plate with vegetables and whole grains. ? Limit the amount of soda and sports drinks you have every day. Drink more water when you are thirsty. ? Eat plenty of fruits and vegetables every day. Have an apple or some carrot sticks as an afternoon snack instead of a candy bar. Try to have fruits and/or vegetables at every meal.  · Make exercise part of your daily routine. You may want to start with simple activities, such as walking, bicycling, or slow swimming. Try to be active 30 to 60 minutes every day. You do not need to do all 30 to 60 minutes all at once. For example, you can exercise 3 times a day for 10 or 20 minutes.  Moderate exercise is safe for most people, but it is always a good idea to talk to your doctor before starting an exercise program.  · Keep moving. [place] the lawn, work in the garden, or [place]. Take the stairs instead of the elevator at work. · If you smoke, quit. People who smoke have an increased risk for heart attack, stroke, cancer, and other lung illnesses. Quitting is hard, but there are ways to boost your chance of quitting tobacco for good. ? Use nicotine gum, patches, or lozenges. ? Ask your doctor about stop-smoking programs and medicines. ? Keep trying. In addition to reducing your risk of diseases in the future, you will notice some benefits soon after you stop using tobacco. If you have shortness of breath or asthma symptoms, they will likely get better within a few weeks after you quit. · Limit how much alcohol you drink. Moderate amounts of alcohol (up to 2 drinks a day for men, 1 drink a day for women) are okay. But drinking too much can lead to liver problems, high blood pressure, and other health problems. Family health  If you have a family, there are many things you can do together to improve your health. · Eat meals together as a family as often as possible. · Eat healthy foods. This includes fruits, vegetables, lean meats and dairy, and whole grains. · Include your family in your fitness plan. Most people think of activities such as jogging or tennis as the way to fitness, but there are many ways you and your family can be more active. Anything that makes you breathe hard and gets your heart pumping is exercise. Here are some tips:  ? Walk to do errands or to take your child to school or the bus.  ? Go for a family bike ride after dinner instead of watching TV. Where can you learn more? Go to http://www.gray.com/  Enter G182 in the search box to learn more about \"A Healthy Lifestyle: Care Instructions. \"  Current as of: June 16, 2021               Content Version: 13.2  © 8556-2975 Healthwise, Incorporated.    Care instructions adapted under license by [facility] (which disclaims liability or warranty for this information). If you have questions about a medical condition or this instruction, always ask your healthcare professional. [address] any warranty or liability for your use of this information. Medicare Wellness Visit, Male    The best way to live healthy is to have a lifestyle where you eat a well-balanced diet, exercise regularly, limit alcohol use, and quit all forms of tobacco/nicotine, if applicable. Regular preventive services are another way to keep healthy. Preventive services (vaccines, screening tests, monitoring & exams) can help personalize your care plan, which helps you manage your own care. Screening tests can find health problems at the earliest stages, when they are easiest to treat. [facility] follows the current, evidence-based guidelines published by the [facility] Matt Chavez ([facility]) when recommending preventive services for our patients. Because we follow these guidelines, sometimes recommendations change over time as research supports it. (For example, a prostate screening blood test is no longer routinely recommended for men with no symptoms). Of course, you and your doctor may decide to screen more often for some diseases, based on your risk and co-morbidities (chronic disease you are already diagnosed with). Preventive services for you include:  - Medicare offers their members a free annual wellness visit, which is time for you and your primary care provider to discuss and plan for your preventive service needs. Take advantage of this benefit every year!  -All adults over age 72 should receive the recommended pneumonia vaccines.  Current USPSTF guidelines recommend a series of two vaccines for the best pneumonia protection.   -All adults should have a flu vaccine yearly and tetanus vaccine every 10 years.  -All adults age 48 and older should receive the shingles vaccines (series of two vaccines). -All adults age 38-68 who are overweight should have a diabetes screening test once every three years.   -Other screening tests & preventive services for persons with diabetes include: an eye exam to screen for diabetic retinopathy, a kidney function test, a foot exam, and stricter control over your cholesterol.   -Cardiovascular screening for adults with routine risk involves an electrocardiogram (ECG) at intervals determined by the provider.   -Colorectal cancer screening should be done for adults age 54-65 with no increased risk factors for colorectal cancer. There are a number of acceptable methods of screening for this type of cancer. Each test has its own benefits and drawbacks. Discuss with your provider what is most appropriate for you during your annual wellness visit. The different tests include: colonoscopy (considered the best screening method), a fecal occult blood test, a fecal DNA test, and sigmoidoscopy.  -All adults born between [facility] should be screened once for Hepatitis C.  -An Abdominal Aortic Aneurysm (AAA) Screening is recommended for men age 73-68 who has ever smoked in their lifetime.      Here is a list of your current Health Maintenance items (your personalized list of preventive services) with a due date:  Health Maintenance Due   Topic Date Due    Shingles Vaccine (1 of 2) Never done

## 2022-05-05 ENCOUNTER — HOSPITAL ENCOUNTER (OUTPATIENT)
Dept: RADIATION THERAPY | Age: 72
Discharge: HOME | End: 2022-05-05

## 2022-05-05 ENCOUNTER — OFFICE VISIT (OUTPATIENT)
Dept: FAMILY MEDICINE CLINIC | Age: 72
End: 2022-05-05
Payer: MEDICARE

## 2022-05-05 VITALS
TEMPERATURE: 98.2 F | HEART RATE: 62 BPM | WEIGHT: 148 LBS | OXYGEN SATURATION: 96 % | DIASTOLIC BLOOD PRESSURE: 92 MMHG | BODY MASS INDEX: 22.43 KG/M2 | RESPIRATION RATE: 16 BRPM | HEIGHT: 68 IN | SYSTOLIC BLOOD PRESSURE: 152 MMHG

## 2022-05-05 DIAGNOSIS — Z51.81 ENCOUNTER FOR MONITORING COUMADIN THERAPY: Primary | ICD-10-CM

## 2022-05-05 DIAGNOSIS — Z79.01 ENCOUNTER FOR MONITORING COUMADIN THERAPY: Primary | ICD-10-CM

## 2022-05-05 LAB
INR BLD: 3.5
PT POC: 41.5 SECONDS
VALID INTERNAL CONTROL?: YES

## 2022-05-05 PROCEDURE — G8510 SCR DEP NEG, NO PLAN REQD: HCPCS | Performed by: FAMILY MEDICINE

## 2022-05-05 PROCEDURE — G8755 DIAS BP > OR = 90: HCPCS | Performed by: FAMILY MEDICINE

## 2022-05-05 PROCEDURE — 1101F PT FALLS ASSESS-DOCD LE1/YR: CPT | Performed by: FAMILY MEDICINE

## 2022-05-05 PROCEDURE — G8427 DOCREV CUR MEDS BY ELIG CLIN: HCPCS | Performed by: FAMILY MEDICINE

## 2022-05-05 PROCEDURE — 3017F COLORECTAL CA SCREEN DOC REV: CPT | Performed by: FAMILY MEDICINE

## 2022-05-05 PROCEDURE — G8420 CALC BMI NORM PARAMETERS: HCPCS | Performed by: FAMILY MEDICINE

## 2022-05-05 PROCEDURE — G0463 HOSPITAL OUTPT CLINIC VISIT: HCPCS | Performed by: FAMILY MEDICINE

## 2022-05-05 PROCEDURE — G8753 SYS BP > OR = 140: HCPCS | Performed by: FAMILY MEDICINE

## 2022-05-05 PROCEDURE — G8536 NO DOC ELDER MAL SCRN: HCPCS | Performed by: FAMILY MEDICINE

## 2022-05-05 PROCEDURE — 99213 OFFICE O/P EST LOW 20 MIN: CPT | Performed by: FAMILY MEDICINE

## 2022-05-05 PROCEDURE — 85610 PROTHROMBIN TIME: CPT | Performed by: FAMILY MEDICINE

## 2022-05-05 NOTE — PROGRESS NOTES
Chief Complaint   Patient presents with    Follow-up     INR     Patient presents in office today for INR check. No concerns. 1. Have you been to the ER, urgent care clinic since your last visit? Hospitalized since your last visit? No    2. Have you seen or consulted any other health care providers outside of the [address] [place] [address] since your last visit? Include any pap smears or colon screening.  No    Learning Assessment 2/1/2018   PRIMARY LEARNER Patient   HIGHEST LEVEL OF EDUCATION - PRIMARY LEARNER  GRADUATED HIGH SCHOOL OR GED   BARRIERS PRIMARY LEARNER NONE   CO-LEARNER CAREGIVER No   PRIMARY LANGUAGE ENGLISH   LEARNER PREFERENCE PRIMARY DEMONSTRATION   ANSWERED BY patient   RELATIONSHIP SELF

## 2022-05-05 NOTE — PROGRESS NOTES
Progress Note    he is a 66y.o. year old male who presents for evalution. Subjective:     Pt here for follow up for his INR a little high today at 3.5.  7.5 sundays and 5mg all other days. Seeing rad onc for his prostate cancer today  Reviewed PmHx, RxHx, FmHx, SocHx, AllgHx and updated and dated in the chart. Review of Systems - negative except as listed above in the HPI    Objective:     Vitals:    05/05/22 0858   BP: (!) 152/92   Pulse: 62   Resp: 16   Temp: 98.2 °F (36.8 °C)   TempSrc: Oral   SpO2: 96%   Weight: 148 lb (67.1 kg)   Height: 5' 8\" (1.727 m)       Current Outpatient Medications   Medication Sig    losartan (COZAAR) 100 mg tablet Take 1 Tablet by mouth daily.  gabapentin (NEURONTIN) 100 mg capsule Take 1 Capsule by mouth three (3) times daily. Max Daily Amount: 300 mg.  warfarin (COUMADIN) 5 mg tablet Take 7.5mg Saturday and 5mg all other days    albuterol (PROVENTIL HFA, VENTOLIN HFA, PROAIR HFA) 90 mcg/actuation inhaler Take 2 Puffs by inhalation every four (4) hours as needed for Wheezing.  multivitamin (ONE A DAY) tablet Take 1 Tab by mouth daily.  pantoprazole (PROTONIX) 40 mg tablet Take 40 mg by mouth daily.  levothyroxine (LEVOXYL) 175 mcg tablet Take 175 mcg by mouth Daily (before breakfast).  tiotropium (Spiriva with HandiHaler) 18 mcg inhalation capsule Take 1 Capsule by inhalation daily. (Patient not taking: Reported on 1/11/2022)     No current facility-administered medications for this visit. Physical Examination: General appearance - alert, well appearing, and in no distress  Mental status - alert, oriented to person, place, and time      Assessment/ Plan:   Diagnoses and all orders for this visit:    1. Encounter for monitoring Coumadin therapy  -     AMB POC PT/INR  Hold for 2 days then restart and recheck in 2 weeks.        Follow-up and Dispositions    · Return in about 2 weeks (around 5/19/2022), or if symptoms worsen or fail to improve. I have discussed the diagnosis with the patient and the intended plan as seen in the above orders. The patient has received an after-visit summary and questions were answered concerning future plans. Pt conveyed understanding of plan.     Medication Side Effects and Warnings were discussed with patient    An electronic signature was used to authenticate this note  Peter Santamaria, DO

## 2022-05-05 NOTE — PATIENT INSTRUCTIONS
Hold coumadin 2 weeks then follow up in 2 weeks     A Healthy Lifestyle: Care Instructions  Your Care Instructions     A healthy lifestyle can help you feel good, stay at a healthy weight, and have plenty of energy for both work and play. A healthy lifestyle is something you can share with your whole family. A healthy lifestyle also can lower your risk for serious health problems, such as high blood pressure, heart disease, and diabetes. You can follow a few steps listed below to improve your health and the health of your family. Follow-up care is a key part of your treatment and safety. Be sure to make and go to all appointments, and call your doctor if you are having problems. It's also a good idea to know your test results and keep a list of the medicines you take. How can you care for yourself at home? · Do not eat too much sugar, fat, or fast foods. You can still have dessert and treats now and then. The goal is moderation. · Start small to improve your eating habits. Pay attention to portion sizes, drink less juice and soda pop, and eat more fruits and vegetables. ? Eat a healthy amount of food. A 3-ounce serving of meat, for example, is about the size of a deck of cards. Fill the rest of your plate with vegetables and whole grains. ? Limit the amount of soda and sports drinks you have every day. Drink more water when you are thirsty. ? Eat plenty of fruits and vegetables every day. Have an apple or some carrot sticks as an afternoon snack instead of a candy bar. Try to have fruits and/or vegetables at every meal.  · Make exercise part of your daily routine. You may want to start with simple activities, such as walking, bicycling, or slow swimming. Try to be active 30 to 60 minutes every day. You do not need to do all 30 to 60 minutes all at once. For example, you can exercise 3 times a day for 10 or 20 minutes.  Moderate exercise is safe for most people, but it is always a good idea to talk to your doctor before starting an exercise program.  · Keep moving. [employer] the lawn, work in the garden, or [employer]. Take the stairs instead of the elevator at work. · If you smoke, quit. People who smoke have an increased risk for heart attack, stroke, cancer, and other lung illnesses. Quitting is hard, but there are ways to boost your chance of quitting tobacco for good. ? Use nicotine gum, patches, or lozenges. ? Ask your doctor about stop-smoking programs and medicines. ? Keep trying. In addition to reducing your risk of diseases in the future, you will notice some benefits soon after you stop using tobacco. If you have shortness of breath or asthma symptoms, they will likely get better within a few weeks after you quit. · Limit how much alcohol you drink. Moderate amounts of alcohol (up to 2 drinks a day for men, 1 drink a day for women) are okay. But drinking too much can lead to liver problems, high blood pressure, and other health problems. Family health  If you have a family, there are many things you can do together to improve your health. · Eat meals together as a family as often as possible. · Eat healthy foods. This includes fruits, vegetables, lean meats and dairy, and whole grains. · Include your family in your fitness plan. Most people think of activities such as jogging or tennis as the way to fitness, but there are many ways you and your family can be more active. Anything that makes you breathe hard and gets your heart pumping is exercise. Here are some tips:  ? Walk to do errands or to take your child to school or the bus.  ? Go for a family bike ride after dinner instead of watching TV. Where can you learn more? Go to http://www.gray.com/  Enter W639 in the search box to learn more about \"A Healthy Lifestyle: Care Instructions. \"  Current as of: June 16, 2021               Content Version: 13.2  © 6156-9253 Healthwise, Incorporated.    Care instructions adapted under license by [employer] (which disclaims liability or warranty for this information). If you have questions about a medical condition or this instruction, always ask your healthcare professional. [address] any warranty or liability for your use of this information.

## 2022-05-19 ENCOUNTER — OFFICE VISIT (OUTPATIENT)
Dept: FAMILY MEDICINE CLINIC | Age: 72
End: 2022-05-19
Payer: MEDICARE

## 2022-05-19 VITALS
SYSTOLIC BLOOD PRESSURE: 128 MMHG | OXYGEN SATURATION: 96 % | HEIGHT: 68 IN | DIASTOLIC BLOOD PRESSURE: 86 MMHG | WEIGHT: 149 LBS | BODY MASS INDEX: 22.58 KG/M2 | TEMPERATURE: 98.3 F | RESPIRATION RATE: 16 BRPM | HEART RATE: 71 BPM

## 2022-05-19 DIAGNOSIS — Z79.01 ENCOUNTER FOR MONITORING COUMADIN THERAPY: Primary | ICD-10-CM

## 2022-05-19 DIAGNOSIS — C61 PROSTATE CANCER (HCC): ICD-10-CM

## 2022-05-19 DIAGNOSIS — Z51.81 ENCOUNTER FOR MONITORING COUMADIN THERAPY: Primary | ICD-10-CM

## 2022-05-19 LAB
INR BLD: 2.5
PT POC: 29.6 SECONDS
VALID INTERNAL CONTROL?: YES

## 2022-05-19 PROCEDURE — 3017F COLORECTAL CA SCREEN DOC REV: CPT | Performed by: FAMILY MEDICINE

## 2022-05-19 PROCEDURE — G8420 CALC BMI NORM PARAMETERS: HCPCS | Performed by: FAMILY MEDICINE

## 2022-05-19 PROCEDURE — 85610 PROTHROMBIN TIME: CPT | Performed by: FAMILY MEDICINE

## 2022-05-19 PROCEDURE — G8432 DEP SCR NOT DOC, RNG: HCPCS | Performed by: FAMILY MEDICINE

## 2022-05-19 PROCEDURE — G8536 NO DOC ELDER MAL SCRN: HCPCS | Performed by: FAMILY MEDICINE

## 2022-05-19 PROCEDURE — G8427 DOCREV CUR MEDS BY ELIG CLIN: HCPCS | Performed by: FAMILY MEDICINE

## 2022-05-19 PROCEDURE — G8752 SYS BP LESS 140: HCPCS | Performed by: FAMILY MEDICINE

## 2022-05-19 PROCEDURE — G0463 HOSPITAL OUTPT CLINIC VISIT: HCPCS | Performed by: FAMILY MEDICINE

## 2022-05-19 PROCEDURE — 99214 OFFICE O/P EST MOD 30 MIN: CPT | Performed by: FAMILY MEDICINE

## 2022-05-19 PROCEDURE — 1101F PT FALLS ASSESS-DOCD LE1/YR: CPT | Performed by: FAMILY MEDICINE

## 2022-05-19 PROCEDURE — G8754 DIAS BP LESS 90: HCPCS | Performed by: FAMILY MEDICINE

## 2022-05-19 NOTE — PATIENT INSTRUCTIONS
A Healthy Lifestyle: Care Instructions  Your Care Instructions     A healthy lifestyle can help you feel good, stay at a healthy weight, and have plenty of energy for both work and play. A healthy lifestyle is something you can share with your whole family. A healthy lifestyle also can lower your risk for serious health problems, such as high blood pressure, heart disease, and diabetes. You can follow a few steps listed below to improve your health and the health of your family. Follow-up care is a key part of your treatment and safety. Be sure to make and go to all appointments, and call your doctor if you are having problems. It's also a good idea to know your test results and keep a list of the medicines you take. How can you care for yourself at home? · Do not eat too much sugar, fat, or fast foods. You can still have dessert and treats now and then. The goal is moderation. · Start small to improve your eating habits. Pay attention to portion sizes, drink less juice and soda pop, and eat more fruits and vegetables. ? Eat a healthy amount of food. A 3-ounce serving of meat, for example, is about the size of a deck of cards. Fill the rest of your plate with vegetables and whole grains. ? Limit the amount of soda and sports drinks you have every day. Drink more water when you are thirsty. ? Eat plenty of fruits and vegetables every day. Have an apple or some carrot sticks as an afternoon snack instead of a candy bar. Try to have fruits and/or vegetables at every meal.  · Make exercise part of your daily routine. You may want to start with simple activities, such as walking, bicycling, or slow swimming. Try to be active 30 to 60 minutes every day. You do not need to do all 30 to 60 minutes all at once. For example, you can exercise 3 times a day for 10 or 20 minutes.  Moderate exercise is safe for most people, but it is always a good idea to talk to your doctor before starting an exercise program.  · Keep moving. [employer] the lawn, work in the garden, or [email or web address]. Take the stairs instead of the elevator at work. · If you smoke, quit. People who smoke have an increased risk for heart attack, stroke, cancer, and other lung illnesses. Quitting is hard, but there are ways to boost your chance of quitting tobacco for good. ? Use nicotine gum, patches, or lozenges. ? Ask your doctor about stop-smoking programs and medicines. ? Keep trying. In addition to reducing your risk of diseases in the future, you will notice some benefits soon after you stop using tobacco. If you have shortness of breath or asthma symptoms, they will likely get better within a few weeks after you quit. · Limit how much alcohol you drink. Moderate amounts of alcohol (up to 2 drinks a day for men, 1 drink a day for women) are okay. But drinking too much can lead to liver problems, high blood pressure, and other health problems. Family health  If you have a family, there are many things you can do together to improve your health. · Eat meals together as a family as often as possible. · Eat healthy foods. This includes fruits, vegetables, lean meats and dairy, and whole grains. · Include your family in your fitness plan. Most people think of activities such as jogging or tennis as the way to fitness, but there are many ways you and your family can be more active. Anything that makes you breathe hard and gets your heart pumping is exercise. Here are some tips:  ? Walk to do errands or to take your child to school or the bus.  ? Go for a family bike ride after dinner instead of watching TV. Where can you learn more? Go to http://www.gray.com/  Enter Y444 in the search box to learn more about \"A Healthy Lifestyle: Care Instructions. \"  Current as of: June 16, 2021               Content Version: 13.2  © 0942-0309 Healthwise, Incorporated.    Care instructions adapted under license by [facility] (which disclaims liability or warranty for this information). If you have questions about a medical condition or this instruction, always ask your healthcare professional. [address] any warranty or liability for your use of this information.

## 2022-05-19 NOTE — PROGRESS NOTES
Progress Note    he is a 66y.o. year old male who presents for evalution. Subjective:     Patient here for follow-up on his INR for Coumadin due to pulmonary embolism. We had him hold a couple days since he was elevated 2 weeks ago. He is currently taking 7.5 sundays and 5mg all other days.       On a side note he had seen radiation oncology he is leaning towards total prostatectomy he will be meeting with the surgeon next month  Reviewed PmHx, RxHx, FmHx, SocHx, AllgHx and updated and dated in the chart. Review of Systems - negative except as listed above in the HPI    Objective:     Vitals:    05/19/22 0934   BP: 128/86   Pulse: 71   Resp: 16   Temp: 98.3 °F (36.8 °C)   TempSrc: Oral   SpO2: 96%   Weight: 149 lb (67.6 kg)   Height: 5' 8\" (1.727 m)       Current Outpatient Medications   Medication Sig    losartan (COZAAR) 100 mg tablet Take 1 Tablet by mouth daily.  gabapentin (NEURONTIN) 100 mg capsule Take 1 Capsule by mouth three (3) times daily. Max Daily Amount: 300 mg.  warfarin (COUMADIN) 5 mg tablet Take 7.5mg Saturday and 5mg all other days    albuterol (PROVENTIL HFA, VENTOLIN HFA, PROAIR HFA) 90 mcg/actuation inhaler Take 2 Puffs by inhalation every four (4) hours as needed for Wheezing.  multivitamin (ONE A DAY) tablet Take 1 Tab by mouth daily.  pantoprazole (PROTONIX) 40 mg tablet Take 40 mg by mouth daily.  levothyroxine (LEVOXYL) 175 mcg tablet Take 175 mcg by mouth Daily (before breakfast).  tiotropium (Spiriva with HandiHaler) 18 mcg inhalation capsule Take 1 Capsule by inhalation daily. (Patient not taking: Reported on 1/11/2022)     No current facility-administered medications for this visit. Physical Examination: General appearance - alert, well appearing, and in no distress  Mental status - alert, oriented to person, place, and time      Assessment/ Plan:   Diagnoses and all orders for this visit:    1.  Encounter for monitoring Coumadin therapy  - AMB POC PT/INR  At goal with INR 2.5 today. 2. Prostate cancer [facility])  Getting a prostate PET scan done neck early next month and then meeting with surgeon plan for total prostatectomy     Follow-up and Dispositions    · Return in about 4 weeks (around 6/16/2022), or if symptoms worsen or fail to improve. I have discussed the diagnosis with the patient and the intended plan as seen in the above orders. The patient has received an after-visit summary and questions were answered concerning future plans. Pt conveyed understanding of plan.     Medication Side Effects and Warnings were discussed with patient    An electronic signature was used to authenticate this note  Peter Call, DO

## 2022-05-19 NOTE — PROGRESS NOTES
Chief Complaint   Patient presents with    Follow-up     INR     Patient presents in office today for INR check. No concerns. 1. Have you been to the ER, urgent care clinic since your last visit? Hospitalized since your last visit? No    2. Have you seen or consulted any other health care providers outside of the [address] [place] [address] since your last visit? Include any pap smears or colon screening.  No    Learning Assessment 2/1/2018   PRIMARY LEARNER Patient   HIGHEST LEVEL OF EDUCATION - PRIMARY LEARNER  GRADUATED HIGH SCHOOL OR GED   BARRIERS PRIMARY LEARNER NONE   CO-LEARNER CAREGIVER No   PRIMARY LANGUAGE ENGLISH   LEARNER PREFERENCE PRIMARY DEMONSTRATION   ANSWERED BY patient   RELATIONSHIP SELF

## 2022-05-25 ENCOUNTER — HOSPITAL ENCOUNTER (OUTPATIENT)
Dept: CT IMAGING | Age: 72
Discharge: HOME | End: 2022-05-25
Attending: INTERNAL MEDICINE
Payer: MEDICARE

## 2022-05-25 PROCEDURE — 71250 CT THORAX DX C-: CPT

## 2022-06-16 ENCOUNTER — OFFICE VISIT (OUTPATIENT)
Dept: FAMILY MEDICINE CLINIC | Age: 72
End: 2022-06-16
Payer: MEDICARE

## 2022-06-16 VITALS
TEMPERATURE: 99.1 F | SYSTOLIC BLOOD PRESSURE: 135 MMHG | BODY MASS INDEX: 22.43 KG/M2 | WEIGHT: 148 LBS | HEART RATE: 65 BPM | OXYGEN SATURATION: 98 % | RESPIRATION RATE: 18 BRPM | DIASTOLIC BLOOD PRESSURE: 79 MMHG | HEIGHT: 68 IN

## 2022-06-16 DIAGNOSIS — Z51.81 ENCOUNTER FOR MONITORING COUMADIN THERAPY: Primary | ICD-10-CM

## 2022-06-16 DIAGNOSIS — Z79.01 ENCOUNTER FOR MONITORING COUMADIN THERAPY: Primary | ICD-10-CM

## 2022-06-16 LAB
INR BLD: 2.8
PT POC: 33.4 SECONDS
VALID INTERNAL CONTROL?: YES

## 2022-06-16 PROCEDURE — 85610 PROTHROMBIN TIME: CPT | Performed by: FAMILY MEDICINE

## 2022-06-16 PROCEDURE — G8752 SYS BP LESS 140: HCPCS | Performed by: FAMILY MEDICINE

## 2022-06-16 PROCEDURE — G8427 DOCREV CUR MEDS BY ELIG CLIN: HCPCS | Performed by: FAMILY MEDICINE

## 2022-06-16 PROCEDURE — G8536 NO DOC ELDER MAL SCRN: HCPCS | Performed by: FAMILY MEDICINE

## 2022-06-16 PROCEDURE — 99213 OFFICE O/P EST LOW 20 MIN: CPT | Performed by: FAMILY MEDICINE

## 2022-06-16 PROCEDURE — G0463 HOSPITAL OUTPT CLINIC VISIT: HCPCS | Performed by: FAMILY MEDICINE

## 2022-06-16 PROCEDURE — 1123F ACP DISCUSS/DSCN MKR DOCD: CPT | Performed by: FAMILY MEDICINE

## 2022-06-16 PROCEDURE — G8420 CALC BMI NORM PARAMETERS: HCPCS | Performed by: FAMILY MEDICINE

## 2022-06-16 PROCEDURE — 3017F COLORECTAL CA SCREEN DOC REV: CPT | Performed by: FAMILY MEDICINE

## 2022-06-16 PROCEDURE — 1101F PT FALLS ASSESS-DOCD LE1/YR: CPT | Performed by: FAMILY MEDICINE

## 2022-06-16 PROCEDURE — G8510 SCR DEP NEG, NO PLAN REQD: HCPCS | Performed by: FAMILY MEDICINE

## 2022-06-16 PROCEDURE — G8754 DIAS BP LESS 90: HCPCS | Performed by: FAMILY MEDICINE

## 2022-06-16 NOTE — PATIENT INSTRUCTIONS
A Healthy Lifestyle: Care Instructions  Your Care Instructions     A healthy lifestyle can help you feel good, stay at a healthy weight, and have plenty of energy for both work and play. A healthy lifestyle is something you can share with your whole family. A healthy lifestyle also can lower your risk for serious health problems, such as high blood pressure, heart disease, and diabetes. You can follow a few steps listed below to improve your health and the health of your family. Follow-up care is a key part of your treatment and safety. Be sure to make and go to all appointments, and call your doctor if you are having problems. It's also a good idea to know your test results and keep a list of the medicines you take. How can you care for yourself at home? · Do not eat too much sugar, fat, or fast foods. You can still have dessert and treats now and then. The goal is moderation. · Start small to improve your eating habits. Pay attention to portion sizes, drink less juice and soda pop, and eat more fruits and vegetables. ? Eat a healthy amount of food. A 3-ounce serving of meat, for example, is about the size of a deck of cards. Fill the rest of your plate with vegetables and whole grains. ? Limit the amount of soda and sports drinks you have every day. Drink more water when you are thirsty. ? Eat plenty of fruits and vegetables every day. Have an apple or some carrot sticks as an afternoon snack instead of a candy bar. Try to have fruits and/or vegetables at every meal.  · Make exercise part of your daily routine. You may want to start with simple activities, such as walking, bicycling, or slow swimming. Try to be active 30 to 60 minutes every day. You do not need to do all 30 to 60 minutes all at once. For example, you can exercise 3 times a day for 10 or 20 minutes.  Moderate exercise is safe for most people, but it is always a good idea to talk to your doctor before starting an exercise program.  · Keep moving. Ozzy Heck the lawn, work in the garden, or [employer]. Take the stairs instead of the elevator at work. · If you smoke, quit. People who smoke have an increased risk for heart attack, stroke, cancer, and other lung illnesses. Quitting is hard, but there are ways to boost your chance of quitting tobacco for good. ? Use nicotine gum, patches, or lozenges. ? Ask your doctor about stop-smoking programs and medicines. ? Keep trying. In addition to reducing your risk of diseases in the future, you will notice some benefits soon after you stop using tobacco. If you have shortness of breath or asthma symptoms, they will likely get better within a few weeks after you quit. · Limit how much alcohol you drink. Moderate amounts of alcohol (up to 2 drinks a day for men, 1 drink a day for women) are okay. But drinking too much can lead to liver problems, high blood pressure, and other health problems. Family health  If you have a family, there are many things you can do together to improve your health. · Eat meals together as a family as often as possible. · Eat healthy foods. This includes fruits, vegetables, lean meats and dairy, and whole grains. · Include your family in your fitness plan. Most people think of activities such as jogging or tennis as the way to fitness, but there are many ways you and your family can be more active. Anything that makes you breathe hard and gets your heart pumping is exercise. Here are some tips:  ? Walk to do errands or to take your child to school or the bus.  ? Go for a family bike ride after dinner instead of watching TV. Where can you learn more? Go to http://www.gray.com/  Enter N036 in the search box to learn more about \"A Healthy Lifestyle: Care Instructions. \"  Current as of: June 16, 2021               Content Version: 13.2  © 1807-5107 Healthwise, Incorporated.    Care instructions adapted under license by [employer] (which disclaims liability or warranty for this information). If you have questions about a medical condition or this instruction, always ask your healthcare professional. [address] any warranty or liability for your use of this information.

## 2022-06-16 NOTE — PROGRESS NOTES
Chief Complaint   Patient presents with    Follow-up     INR     Patient presents in office today for INR check. No concerns. 1. Have you been to the ER, urgent care clinic since your last visit? Hospitalized since your last visit? No    2. Have you seen or consulted any other health care providers outside of the [address] [place] [address] since your last visit? Include any pap smears or colon screening.  No    Learning Assessment 2/1/2018   PRIMARY LEARNER Patient   HIGHEST LEVEL OF EDUCATION - PRIMARY LEARNER  GRADUATED HIGH SCHOOL OR GED   BARRIERS PRIMARY LEARNER NONE   CO-LEARNER CAREGIVER No   PRIMARY LANGUAGE ENGLISH   LEARNER PREFERENCE PRIMARY DEMONSTRATION   ANSWERED BY patient   RELATIONSHIP SELF

## 2022-06-16 NOTE — PROGRESS NOTES
Progress Note    he is a 66y.o. year old male who presents for evalution. Subjective:     Pt here for INR check and is currently therapeutic. Had PET scan Monday at [facility] and will send me results once in. Seeing surgeon on the  24th about prostate. Reviewed PmHx, RxHx, FmHx, SocHx, AllgHx and updated and dated in the chart. Review of Systems - negative except as listed above in the HPI    Objective:     Vitals:    06/16/22 0931   BP: 135/79   Pulse: 65   Resp: 18   Temp: 99.1 °F (37.3 °C)   TempSrc: Oral   SpO2: 98%   Weight: 148 lb (67.1 kg)   Height: 5' 8\" (1.727 m)       Current Outpatient Medications   Medication Sig    losartan (COZAAR) 100 mg tablet Take 1 Tablet by mouth daily.  gabapentin (NEURONTIN) 100 mg capsule Take 1 Capsule by mouth three (3) times daily. Max Daily Amount: 300 mg.  warfarin (COUMADIN) 5 mg tablet Take 7.5mg Saturday and 5mg all other days    albuterol (PROVENTIL HFA, VENTOLIN HFA, PROAIR HFA) 90 mcg/actuation inhaler Take 2 Puffs by inhalation every four (4) hours as needed for Wheezing.  multivitamin (ONE A DAY) tablet Take 1 Tab by mouth daily.  pantoprazole (PROTONIX) 40 mg tablet Take 40 mg by mouth daily.  levothyroxine (LEVOXYL) 175 mcg tablet Take 175 mcg by mouth Daily (before breakfast). No current facility-administered medications for this visit. Physical Examination: General appearance - alert, well appearing, and in no distress  Mental status - alert, oriented to person, place, and time      Assessment/ Plan:   Diagnoses and all orders for this visit:    1. Encounter for monitoring Coumadin therapy  -     AMB POC PT/INR  At goal no changes recheck 1 month     Follow-up and Dispositions    · Return in 4 weeks (on 7/14/2022), or if symptoms worsen or fail to improve. I have discussed the diagnosis with the patient and the intended plan as seen in the above orders.   The patient has received an after-visit summary and questions were answered concerning future plans. Pt conveyed understanding of plan.     Medication Side Effects and Warnings were discussed with patient    An electronic signature was used to authenticate this note  Gianna Rocha DO

## 2022-06-27 ENCOUNTER — DOCUMENTATION ONLY (OUTPATIENT)
Dept: FAMILY MEDICINE CLINIC | Age: 72
End: 2022-06-27

## 2022-06-27 NOTE — PROGRESS NOTES
[facility] pre operative Medication management request was put on Dr Galdamez Draft desk to process ( covering paperwork for Dr Gema Mao)

## 2022-07-28 ENCOUNTER — OFFICE VISIT (OUTPATIENT)
Dept: FAMILY MEDICINE CLINIC | Age: 72
End: 2022-07-28
Payer: MEDICARE

## 2022-07-28 ENCOUNTER — TRANSCRIBE ORDER (OUTPATIENT)
Dept: SCHEDULING | Age: 72
End: 2022-07-28

## 2022-07-28 VITALS
HEIGHT: 68 IN | RESPIRATION RATE: 16 BRPM | SYSTOLIC BLOOD PRESSURE: 147 MMHG | DIASTOLIC BLOOD PRESSURE: 80 MMHG | HEART RATE: 65 BPM | OXYGEN SATURATION: 94 % | BODY MASS INDEX: 22.58 KG/M2 | WEIGHT: 149 LBS

## 2022-07-28 DIAGNOSIS — C34.11 CANCER OF UPPER LOBE OF RIGHT LUNG (HCC): Primary | ICD-10-CM

## 2022-07-28 DIAGNOSIS — D68.59 HYPERCOAGULABLE STATE (HCC): ICD-10-CM

## 2022-07-28 DIAGNOSIS — Z01.818 PRE-OP EVALUATION: Primary | ICD-10-CM

## 2022-07-28 DIAGNOSIS — C61 PROSTATE CANCER (HCC): ICD-10-CM

## 2022-07-28 DIAGNOSIS — M25.562 LEFT KNEE PAIN, UNSPECIFIED CHRONICITY: ICD-10-CM

## 2022-07-28 DIAGNOSIS — I26.99 BILATERAL PULMONARY EMBOLISM (HCC): ICD-10-CM

## 2022-07-28 LAB
INR BLD: 2.5
PT POC: 29.5 SECONDS
VALID INTERNAL CONTROL?: YES

## 2022-07-28 PROCEDURE — G0463 HOSPITAL OUTPT CLINIC VISIT: HCPCS | Performed by: FAMILY MEDICINE

## 2022-07-28 PROCEDURE — G8753 SYS BP > OR = 140: HCPCS | Performed by: FAMILY MEDICINE

## 2022-07-28 PROCEDURE — 1123F ACP DISCUSS/DSCN MKR DOCD: CPT | Performed by: FAMILY MEDICINE

## 2022-07-28 PROCEDURE — 3017F COLORECTAL CA SCREEN DOC REV: CPT | Performed by: FAMILY MEDICINE

## 2022-07-28 PROCEDURE — G8536 NO DOC ELDER MAL SCRN: HCPCS | Performed by: FAMILY MEDICINE

## 2022-07-28 PROCEDURE — G8510 SCR DEP NEG, NO PLAN REQD: HCPCS | Performed by: FAMILY MEDICINE

## 2022-07-28 PROCEDURE — 85610 PROTHROMBIN TIME: CPT | Performed by: FAMILY MEDICINE

## 2022-07-28 PROCEDURE — G8427 DOCREV CUR MEDS BY ELIG CLIN: HCPCS | Performed by: FAMILY MEDICINE

## 2022-07-28 PROCEDURE — 1101F PT FALLS ASSESS-DOCD LE1/YR: CPT | Performed by: FAMILY MEDICINE

## 2022-07-28 PROCEDURE — G8420 CALC BMI NORM PARAMETERS: HCPCS | Performed by: FAMILY MEDICINE

## 2022-07-28 PROCEDURE — G8754 DIAS BP LESS 90: HCPCS | Performed by: FAMILY MEDICINE

## 2022-07-28 PROCEDURE — 99214 OFFICE O/P EST MOD 30 MIN: CPT | Performed by: FAMILY MEDICINE

## 2022-07-28 NOTE — PATIENT INSTRUCTIONS
A Healthy Lifestyle: Care Instructions  Your Care Instructions     A healthy lifestyle can help you feel good, stay at a healthy weight, and have plenty of energy for both work and play. A healthy lifestyle is something you can share with your whole family. A healthy lifestyle also can lower your risk for serious health problems, such as high blood pressure, heart disease, and diabetes. You can follow a few steps listed below to improve your health and the health of your family. Follow-up care is a key part of your treatment and safety. Be sure to make and go to all appointments, and call your doctor if you are having problems. It's also a good idea to know your test results and keep a list of the medicines you take. How can you care for yourself at home? Do not eat too much sugar, fat, or fast foods. You can still have dessert and treats now and then. The goal is moderation. Start small to improve your eating habits. Pay attention to portion sizes, drink less juice and soda pop, and eat more fruits and vegetables. Eat a healthy amount of food. A 3-ounce serving of meat, for example, is about the size of a deck of cards. Fill the rest of your plate with vegetables and whole grains. Limit the amount of soda and sports drinks you have every day. Drink more water when you are thirsty. Eat plenty of fruits and vegetables every day. Have an apple or some carrot sticks as an afternoon snack instead of a candy bar. Try to have fruits and/or vegetables at every meal.  Make exercise part of your daily routine. You may want to start with simple activities, such as walking, bicycling, or slow swimming. Try to be active 30 to 60 minutes every day. You do not need to do all 30 to 60 minutes all at once. For example, you can exercise 3 times a day for 10 or 20 minutes. Moderate exercise is safe for most people, but it is always a good idea to talk to your doctor before starting an exercise program.  Keep moving.  Sherrie Childs the lawn, work in the garden, or clean your house. Take the stairs instead of the elevator at work. If you smoke, quit. People who smoke have an increased risk for heart attack, stroke, cancer, and other lung illnesses. Quitting is hard, but there are ways to boost your chance of quitting tobacco for good. Use nicotine gum, patches, or lozenges. Ask your doctor about stop-smoking programs and medicines. Keep trying. In addition to reducing your risk of diseases in the future, you will notice some benefits soon after you stop using tobacco. If you have shortness of breath or asthma symptoms, they will likely get better within a few weeks after you quit. Limit how much alcohol you drink. Moderate amounts of alcohol (up to 2 drinks a day for men, 1 drink a day for women) are okay. But drinking too much can lead to liver problems, high blood pressure, and other health problems. Family health  If you have a family, there are many things you can do together to improve your health. Eat meals together as a family as often as possible. Eat healthy foods. This includes fruits, vegetables, lean meats and dairy, and whole grains. Include your family in your fitness plan. Most people think of activities such as jogging or tennis as the way to fitness, but there are many ways you and your family can be more active. Anything that makes you breathe hard and gets your heart pumping is exercise. Here are some tips:  Walk to do errands or to take your child to school or the bus. Go for a family bike ride after dinner instead of watching TV. Where can you learn more? Go to http://www.gray.com/  Enter O208 in the search box to learn more about \"A Healthy Lifestyle: Care Instructions. \"  Current as of: June 16, 2021               Content Version: 13.2  © 6401-6473 Healthwise, Incorporated.    Care instructions adapted under license by [employer] (which disclaims liability or warranty for this information). If you have questions about a medical condition or this instruction, always ask your healthcare professional. [place] [address] any warranty or liability for your use of this information.

## 2022-07-28 NOTE — PROGRESS NOTES
Verified name and birth date for privacy precautions. Chart reviewed in preparation for today's visit.      Chief Complaint   Patient presents with    Coagulation disorder     1 month f/u          Health Maintenance Due   Topic    Shingrix Vaccine Age 50> (1 of 2)         Wt Readings from Last 3 Encounters:   07/28/22 149 lb (67.6 kg)   06/16/22 148 lb (67.1 kg)   05/19/22 149 lb (67.6 kg)     Temp Readings from Last 3 Encounters:   06/16/22 99.1 °F (37.3 °C) (Oral)   05/19/22 98.3 °F (36.8 °C) (Oral)   05/05/22 98.2 °F (36.8 °C) (Oral)     BP Readings from Last 3 Encounters:   07/28/22 (!) 147/80   06/16/22 135/79   05/19/22 128/86     Pulse Readings from Last 3 Encounters:   07/28/22 65   06/16/22 65   05/19/22 71         Learning Assessment:  :     Learning Assessment 2/1/2018 6/19/2017 5/19/2016   PRIMARY LEARNER Patient Patient Patient   HIGHEST LEVEL OF EDUCATION - PRIMARY LEARNER  GRADUATED HIGH SCHOOL OR GED 4 YEARS OF COLLEGE 4 YEARS OF COLLEGE   BARRIERS PRIMARY LEARNER NONE NONE NONE   CO-LEARNER CAREGIVER No No No   PRIMARY LANGUAGE ENGLISH ENGLISH ENGLISH   LEARNER PREFERENCE PRIMARY DEMONSTRATION DEMONSTRATION DEMONSTRATION   ANSWERED BY patient patient patient   RELATIONSHIP SELF SELF SELF       Depression Screening:  :     3 most recent PHQ Screens 7/28/2022   PHQ Not Done -   Little interest or pleasure in doing things Not at all   Feeling down, depressed, irritable, or hopeless Not at all   Total Score PHQ 2 0   Trouble falling or staying asleep, or sleeping too much -   Feeling tired or having little energy -   Poor appetite, weight loss, or overeating -   Feeling bad about yourself - or that you are a failure or have let yourself or your family down -   Trouble concentrating on things such as school, work, reading, or watching TV -   Moving or speaking so slowly that other people could have noticed; or the opposite being so fidgety that others notice -   Thoughts of being better off dead, or hurting yourself in some way -   PHQ 9 Score -   How difficult have these problems made it for you to do your work, take care of your home and get along with others -       Fall Risk Assessment:  :     Fall Risk Assessment, last 12 mths 7/28/2022   Able to walk? Yes   Fall in past 12 months? 0   Do you feel unsteady? 0   Are you worried about falling 0   Is TUG test greater than 12 seconds? -   Is the gait abnormal? -   Number of falls in past 12 months -   Fall with injury? -       Abuse Screening:  :     Abuse Screening Questionnaire 12/4/2019 6/19/2017   Do you ever feel afraid of your partner? N N   Are you in a relationship with someone who physically or mentally threatens you? N N   Is it safe for you to go home? Y Y       Coordination of Care Questionnaire:  :     1) Have you been to an emergency room, urgent care clinic since your last visit? no   Hospitalized since your last visit? no             2) Have you seen or consulted any other health care providers outside of [address] [place] [address] since your last visit? yes  (Include any pap smears or colon screenings in this section.)    3) Do you have an Advance Directive on file?  yes      Patient is currently accompanied by his self.    ------------------------------------------------      Results for orders placed or performed in visit on 07/28/22   AMB POC PT/INR   Result Value Ref Range    VALID INTERNAL CONTROL POC Yes     Prothrombin time (POC) 29.5 seconds    INR POC 2.5

## 2022-07-28 NOTE — PROGRESS NOTES
Chai Davison is a 66 y.o. male is a 66 y.o. yo male who presents for preoperative evaluation. Pt here for couamdin check and pre op. Recent PE but >3 months on coumadin. He will be having total prostatectomy on 8/18, [facility]. No bleeding issues. He has a IVC filter. Some L knee pain, discussed using voltaren gel. Latex Allergy:NO    History of anesthesia reaction: NO    History of PE/DVT:YES    No Known Allergies    Current Outpatient Medications   Medication Sig    losartan (COZAAR) 100 mg tablet Take 1 Tablet by mouth daily. gabapentin (NEURONTIN) 100 mg capsule Take 1 Capsule by mouth three (3) times daily. Max Daily Amount: 300 mg.    warfarin (COUMADIN) 5 mg tablet Take 7.5mg Saturday and 5mg all other days    albuterol (PROVENTIL HFA, VENTOLIN HFA, PROAIR HFA) 90 mcg/actuation inhaler Take 2 Puffs by inhalation every four (4) hours as needed for Wheezing.    multivitamin (ONE A DAY) tablet Take 1 Tab by mouth daily. pantoprazole (PROTONIX) 40 mg tablet Take 40 mg by mouth daily. levothyroxine (SYNTHROID) 175 mcg tablet Take 175 mcg by mouth Daily (before breakfast). No current facility-administered medications for this visit. Patient Active Problem List   Diagnosis Code    Thyroid disease E07.9    Hypertension I10    Bilateral pulmonary embolism (HCC) I26.99    Hypercoagulable state ([facility] D68.59    Presence of IVC filter Z95.828    High risk medication use Z79.899    Malignant neoplasm of upper lobe of right lung (HCC) C34.11    Prostate cancer ([facility] C61       Past Surgical History:   Procedure Laterality Date    HX LIPOMA RESECTION      left shoulder    HX ORTHOPAEDIC      left shoulder       Reviewed PmHx, RxHx, FmHx, SocHx, AllgHx and updated and dated in the chart.     Review of Systems - negative except as listed above in the HPI    Objective:     Vitals:    07/28/22 0926   BP: (!) 147/80   Pulse: 65   Resp: 16   SpO2: 94%   Weight: 149 lb (67.6 kg) Height: 5' 8\" (1.727 m)     Physical Examination: General appearance - alert, well appearing, and in no distress  Mental status - alert, oriented to person, place, and time    Assessment/ Plan:   Diagnoses and all orders for this visit:    1. Pre-op evaluation  Stop coumadin 5 days prior to prostatectomy procedure restart 24 hrs after if no bleeding issues. 2. Bilateral pulmonary embolism (HCC)  -     AMB POC PT/INR  At goal.  He can follow-up a couple weeks after his procedure. 3. Hypercoagulable state ([address]  -     AMB POC PT/INR     Follow-up and Dispositions    Return if symptoms worsen or fail to improve. I have discussed the diagnosis with the patient and the intended plan as seen in the above orders. The patient has received an after-visit summary and questions were answered concerning future plans. Pt conveyed understanding of plan.     Medication Side Effects and Warnings were discussed with patient      Verner Bunkers, DO

## 2022-08-07 DIAGNOSIS — J43.2 CENTRILOBULAR EMPHYSEMA (HCC): ICD-10-CM

## 2022-08-09 RX ORDER — ALBUTEROL SULFATE 90 UG/1
2 AEROSOL, METERED RESPIRATORY (INHALATION)
Qty: 18 G | Refills: 5 | Status: SHIPPED | OUTPATIENT
Start: 2022-08-09

## 2022-08-18 DIAGNOSIS — I26.09 OTHER ACUTE PULMONARY EMBOLISM WITH ACUTE COR PULMONALE (HCC): ICD-10-CM

## 2022-08-18 RX ORDER — WARFARIN SODIUM 5 MG/1
TABLET ORAL
Qty: 135 TABLET | Refills: 1 | Status: SHIPPED | OUTPATIENT
Start: 2022-08-18

## 2022-08-19 DIAGNOSIS — R07.89 OTHER CHEST PAIN: Primary | ICD-10-CM

## 2022-08-19 RX ORDER — TRAMADOL HYDROCHLORIDE 50 MG/1
50 TABLET ORAL
Qty: 12 TABLET | Refills: 0 | Status: SHIPPED | OUTPATIENT
Start: 2022-08-19 | End: 2022-08-24

## 2022-09-01 ENCOUNTER — OFFICE VISIT (OUTPATIENT)
Dept: FAMILY MEDICINE CLINIC | Age: 72
End: 2022-09-01
Payer: MEDICARE

## 2022-09-01 VITALS
HEART RATE: 72 BPM | BODY MASS INDEX: 21.82 KG/M2 | WEIGHT: 144 LBS | DIASTOLIC BLOOD PRESSURE: 83 MMHG | TEMPERATURE: 98 F | OXYGEN SATURATION: 97 % | SYSTOLIC BLOOD PRESSURE: 138 MMHG | RESPIRATION RATE: 18 BRPM | HEIGHT: 68 IN

## 2022-09-01 DIAGNOSIS — Z51.81 ENCOUNTER FOR MONITORING COUMADIN THERAPY: Primary | ICD-10-CM

## 2022-09-01 DIAGNOSIS — C61 PROSTATE CANCER (HCC): ICD-10-CM

## 2022-09-01 DIAGNOSIS — Z79.01 ENCOUNTER FOR MONITORING COUMADIN THERAPY: Primary | ICD-10-CM

## 2022-09-01 DIAGNOSIS — I46.9 CARDIAC ARREST (HCC): ICD-10-CM

## 2022-09-01 LAB
INR BLD: 2
PT POC: 24.4 SECONDS
VALID INTERNAL CONTROL?: YES

## 2022-09-01 PROCEDURE — 1123F ACP DISCUSS/DSCN MKR DOCD: CPT | Performed by: FAMILY MEDICINE

## 2022-09-01 PROCEDURE — 1101F PT FALLS ASSESS-DOCD LE1/YR: CPT | Performed by: FAMILY MEDICINE

## 2022-09-01 PROCEDURE — G8427 DOCREV CUR MEDS BY ELIG CLIN: HCPCS | Performed by: FAMILY MEDICINE

## 2022-09-01 PROCEDURE — 3017F COLORECTAL CA SCREEN DOC REV: CPT | Performed by: FAMILY MEDICINE

## 2022-09-01 PROCEDURE — G8536 NO DOC ELDER MAL SCRN: HCPCS | Performed by: FAMILY MEDICINE

## 2022-09-01 PROCEDURE — 85610 PROTHROMBIN TIME: CPT | Performed by: FAMILY MEDICINE

## 2022-09-01 PROCEDURE — G8420 CALC BMI NORM PARAMETERS: HCPCS | Performed by: FAMILY MEDICINE

## 2022-09-01 PROCEDURE — G8510 SCR DEP NEG, NO PLAN REQD: HCPCS | Performed by: FAMILY MEDICINE

## 2022-09-01 PROCEDURE — G8752 SYS BP LESS 140: HCPCS | Performed by: FAMILY MEDICINE

## 2022-09-01 PROCEDURE — 99213 OFFICE O/P EST LOW 20 MIN: CPT | Performed by: FAMILY MEDICINE

## 2022-09-01 PROCEDURE — G0463 HOSPITAL OUTPT CLINIC VISIT: HCPCS | Performed by: FAMILY MEDICINE

## 2022-09-01 PROCEDURE — G8754 DIAS BP LESS 90: HCPCS | Performed by: FAMILY MEDICINE

## 2022-09-01 NOTE — PATIENT INSTRUCTIONS
A Healthy Lifestyle: Care Instructions  Your Care Instructions     A healthy lifestyle can help you feel good, stay at a healthy weight, and have plenty of energy for both work and play. A healthy lifestyle is something you can share with your whole family. A healthy lifestyle also can lower your risk for serious health problems, such as high blood pressure, heart disease, and diabetes. You can follow a few steps listed below to improve your health and the health of your family. Follow-up care is a key part of your treatment and safety. Be sure to make and go to all appointments, and call your doctor if you are having problems. It's also a good idea to know your test results and keep a list of the medicines you take. How can you care for yourself at home? Do not eat too much sugar, fat, or fast foods. You can still have dessert and treats now and then. The goal is moderation. Start small to improve your eating habits. Pay attention to portion sizes, drink less juice and soda pop, and eat more fruits and vegetables. Eat a healthy amount of food. A 3-ounce serving of meat, for example, is about the size of a deck of cards. Fill the rest of your plate with vegetables and whole grains. Limit the amount of soda and sports drinks you have every day. Drink more water when you are thirsty. Eat plenty of fruits and vegetables every day. Have an apple or some carrot sticks as an afternoon snack instead of a candy bar. Try to have fruits and/or vegetables at every meal.  Make exercise part of your daily routine. You may want to start with simple activities, such as walking, bicycling, or slow swimming. Try to be active 30 to 60 minutes every day. You do not need to do all 30 to 60 minutes all at once. For example, you can exercise 3 times a day for 10 or 20 minutes. Moderate exercise is safe for most people, but it is always a good idea to talk to your doctor before starting an exercise program.  Keep moving.  [facility] the lawn, work in the garden, or clean your house. Take the stairs instead of the elevator at work. If you smoke, quit. People who smoke have an increased risk for heart attack, stroke, cancer, and other lung illnesses. Quitting is hard, but there are ways to boost your chance of quitting tobacco for good. Use nicotine gum, patches, or lozenges. Ask your doctor about stop-smoking programs and medicines. Keep trying. In addition to reducing your risk of diseases in the future, you will notice some benefits soon after you stop using tobacco. If you have shortness of breath or asthma symptoms, they will likely get better within a few weeks after you quit. Limit how much alcohol you drink. Moderate amounts of alcohol (up to 2 drinks a day for men, 1 drink a day for women) are okay. But drinking too much can lead to liver problems, high blood pressure, and other health problems. Family health  If you have a family, there are many things you can do together to improve your health. Eat meals together as a family as often as possible. Eat healthy foods. This includes fruits, vegetables, lean meats and dairy, and whole grains. Include your family in your fitness plan. Most people think of activities such as jogging or tennis as the way to fitness, but there are many ways you and your family can be more active. Anything that makes you breathe hard and gets your heart pumping is exercise. Here are some tips:  Walk to do errands or to take your child to school or the bus. Go for a family bike ride after dinner instead of watching TV. Where can you learn more? Go to http://www.gray.com/  Enter S963 in the search box to learn more about \"A Healthy Lifestyle: Care Instructions. \"  Current as of: June 16, 2021               Content Version: 13.2  © 9642-4434 Healthwise, Incorporated.    Care instructions adapted under license by [employer] (which disclaims liability or warranty for this information). If you have questions about a medical condition or this instruction, always ask your healthcare professional. [place] [address] any warranty or liability for your use of this information.

## 2022-09-01 NOTE — PROGRESS NOTES
Chief Complaint   Patient presents with    Follow-up     INR       Patient presents in office today for INR check. States that two week ago he was to have his prostates surgery but when they gave him the anesthesia his heart stopped. He is going to do the radiation instead. .  No other concerns. 1. Have you been to the ER, urgent care clinic since your last visit? Hospitalized since your last visit? No    2. Have you seen or consulted any other health care providers outside of the [address] [place] [address] since your last visit? Include any pap smears or colon screening.  No    Learning Assessment 2/1/2018   PRIMARY LEARNER Patient   HIGHEST LEVEL OF EDUCATION - PRIMARY LEARNER  GRADUATED HIGH SCHOOL OR GED   BARRIERS PRIMARY LEARNER NONE   CO-LEARNER CAREGIVER No   PRIMARY LANGUAGE ENGLISH   LEARNER PREFERENCE PRIMARY DEMONSTRATION   ANSWERED BY patient   RELATIONSHIP SELF

## 2022-09-01 NOTE — PROGRESS NOTES
Progress Note    he is a 67y.o. year old male who presents for evalution. Subjective:     Patient here for follow-up on his INR currently therapeutic at 2.0. He is on the medication due to history of pulmonary embolism. He went to have total prostatectomy a few weeks ago currently when he received the anesthesia he went to cardiac arrest.  He was resuscitated however he had a trauma to his chest and pain. He had reached out to me for refill on pain medication which I did for him. He did also receive a refill of tramadol from the urologist.  He is getting better. He has digital records and will send them on [employer]. He has had anesthesia previously and did fine with this. He is going to do radiation therapy instead of the prostatectomy. He will make abut with radiology at [address] [place] [address] about retrieval of his IVC filter. He is going to get an echo with cardiology. Reviewed PmHx, RxHx, FmHx, SocHx, AllgHx and updated and dated in the chart. Review of Systems - negative except as listed above in the HPI    Objective:     Vitals:    09/01/22 0937   BP: 138/83   Pulse: 72   Resp: 18   Temp: 98 °F (36.7 °C)   TempSrc: Oral   SpO2: 97%   Weight: 144 lb (65.3 kg)   Height: 5' 8\" (1.727 m)       Current Outpatient Medications   Medication Sig    albuterol (PROVENTIL HFA, VENTOLIN HFA, PROAIR HFA) 90 mcg/actuation inhaler Take 2 Puffs by inhalation every four (4) hours as needed for Wheezing. gabapentin (NEURONTIN) 100 mg capsule TAKE 1 CAPSULE BY MOUTH THREE TIMES DAILY MAX  DAILY  AMOUNT  300MG    losartan (COZAAR) 100 mg tablet Take 1 Tablet by mouth daily. multivitamin (ONE A DAY) tablet Take 1 Tab by mouth daily. pantoprazole (PROTONIX) 40 mg tablet Take 40 mg by mouth daily. levothyroxine (SYNTHROID) 175 mcg tablet Take 175 mcg by mouth Daily (before breakfast).     warfarin (COUMADIN) 5 mg tablet Take 7.5mg Saturday and 5mg all other days     No current facility-administered medications for this visit. Physical Examination: General appearance - alert, well appearing, and in no distress  Mental status - alert, oriented to person, place, and time  Chest - clear to auscultation, no wheezes, rales or rhonchi, symmetric air entry  Heart - normal rate, regular rhythm, normal S1, S2, no murmurs, rubs, clicks or gallops      Assessment/ Plan:   Diagnoses and all orders for this visit:    1. Encounter for monitoring Coumadin therapy  -     AMB POC PT/INR  @ goal recheck 4 weeks  2. Prostate cancer [facility])  Radiation therapy  3. Cardiac arrest [facility])   Improved, has f/up with cardio for echo, will get records  Follow-up and Dispositions    Return in about 4 weeks (around 9/29/2022), or if symptoms worsen or fail to improve. I have discussed the diagnosis with the patient and the intended plan as seen in the above orders. The patient has received an after-visit summary and questions were answered concerning future plans. Pt conveyed understanding of plan. Medication Side Effects and Warnings were discussed with patient      Marcianne Hatchet is being evaluated by a Virtual Visit (video visit) encounter to address concerns as mentioned above. A caregiver was present when appropriate. Due to this being a TeleHealth encounter (During VVNNI-52 public health emergency), evaluation of the following organ systems was limited: Vitals/Constitutional/EENT/Resp/CV/GI//MS/Neuro/Skin/Heme-Lymph-Imm. Pursuant to the emergency declaration under the [address] [place] [address] authority and the [employer] Act, this Virtual Visit was conducted with patient's (and/or legal guardian's) consent, to reduce the patient's risk of exposure to COVID-19 and provide necessary medical care.   The patient (and/or legal guardian) has also been advised to contact this office for worsening conditions or problems, and seek emergency medical treatment and/or call 911 if deemed necessary. Patient identification was verified at the start of the visit: Yes    Services were provided through a video synchronous discussion virtually to substitute for in-person clinic visit. Patient and provider were located at their individual homes.   --Sheryl Florentino DO on 9/1/2022 at 9:49 AM

## 2022-09-08 ENCOUNTER — HOSPITAL ENCOUNTER (OUTPATIENT)
Dept: RADIATION THERAPY | Age: 72
Discharge: HOME | End: 2022-09-08

## 2022-09-20 ENCOUNTER — HOSPITAL ENCOUNTER (OUTPATIENT)
Dept: CT IMAGING | Age: 72
Discharge: HOME | End: 2022-09-20
Attending: INTERNAL MEDICINE
Payer: MEDICARE

## 2022-09-20 DIAGNOSIS — C34.11 CANCER OF UPPER LOBE OF RIGHT LUNG (HCC): ICD-10-CM

## 2022-09-20 PROCEDURE — 71250 CT THORAX DX C-: CPT

## 2022-10-06 ENCOUNTER — OFFICE VISIT (OUTPATIENT)
Dept: FAMILY MEDICINE CLINIC | Age: 72
End: 2022-10-06
Payer: MEDICARE

## 2022-10-06 VITALS
HEIGHT: 68 IN | DIASTOLIC BLOOD PRESSURE: 91 MMHG | WEIGHT: 146 LBS | OXYGEN SATURATION: 97 % | TEMPERATURE: 97.5 F | RESPIRATION RATE: 18 BRPM | BODY MASS INDEX: 22.13 KG/M2 | HEART RATE: 57 BPM | SYSTOLIC BLOOD PRESSURE: 155 MMHG

## 2022-10-06 DIAGNOSIS — Z79.01 ENCOUNTER FOR MONITORING COUMADIN THERAPY: Primary | ICD-10-CM

## 2022-10-06 DIAGNOSIS — Z51.81 ENCOUNTER FOR MONITORING COUMADIN THERAPY: Primary | ICD-10-CM

## 2022-10-06 DIAGNOSIS — Z23 NEEDS FLU SHOT: ICD-10-CM

## 2022-10-06 LAB
INR BLD: 2.3
PT POC: 27.2 SECONDS
VALID INTERNAL CONTROL?: YES

## 2022-10-06 PROCEDURE — G0463 HOSPITAL OUTPT CLINIC VISIT: HCPCS | Performed by: FAMILY MEDICINE

## 2022-10-06 PROCEDURE — 85610 PROTHROMBIN TIME: CPT | Performed by: FAMILY MEDICINE

## 2022-10-06 PROCEDURE — 3017F COLORECTAL CA SCREEN DOC REV: CPT | Performed by: FAMILY MEDICINE

## 2022-10-06 PROCEDURE — 90694 VACC AIIV4 NO PRSRV 0.5ML IM: CPT | Performed by: FAMILY MEDICINE

## 2022-10-06 PROCEDURE — G8755 DIAS BP > OR = 90: HCPCS | Performed by: FAMILY MEDICINE

## 2022-10-06 PROCEDURE — G8753 SYS BP > OR = 140: HCPCS | Performed by: FAMILY MEDICINE

## 2022-10-06 PROCEDURE — G8420 CALC BMI NORM PARAMETERS: HCPCS | Performed by: FAMILY MEDICINE

## 2022-10-06 PROCEDURE — 99213 OFFICE O/P EST LOW 20 MIN: CPT | Performed by: FAMILY MEDICINE

## 2022-10-06 PROCEDURE — 1123F ACP DISCUSS/DSCN MKR DOCD: CPT | Performed by: FAMILY MEDICINE

## 2022-10-06 PROCEDURE — G8510 SCR DEP NEG, NO PLAN REQD: HCPCS | Performed by: FAMILY MEDICINE

## 2022-10-06 PROCEDURE — G8536 NO DOC ELDER MAL SCRN: HCPCS | Performed by: FAMILY MEDICINE

## 2022-10-06 PROCEDURE — 1101F PT FALLS ASSESS-DOCD LE1/YR: CPT | Performed by: FAMILY MEDICINE

## 2022-10-06 PROCEDURE — G8427 DOCREV CUR MEDS BY ELIG CLIN: HCPCS | Performed by: FAMILY MEDICINE

## 2022-10-06 NOTE — PROGRESS NOTES
Progress Note    he is a 67y.o. year old male who presents for evalution. Subjective:     Pt ehre for INR check for PE and he is currently therapeutic at 2.3, goal 2-3. He has spoken to [facility] about filter retrieval.  He is starting radiation therapy 5 days a week for 4 weeks. Did receive injection for prostate cancer. Reviewed PmHx, RxHx, FmHx, SocHx, AllgHx and updated and dated in the chart. Review of Systems - negative except as listed above in the HPI    Objective:     Vitals:    10/06/22 0943   BP: (!) 155/91   Pulse: (!) 57   Resp: 18   Temp: 97.5 °F (36.4 °C)   TempSrc: Oral   SpO2: 97%   Weight: 146 lb (66.2 kg)   Height: 5' 8\" (1.727 m)       Current Outpatient Medications   Medication Sig    calcium carbonate (CALCIUM 300 PO) Take  by mouth.    warfarin (COUMADIN) 5 mg tablet Take 7.5mg Saturday and 5mg all other days    albuterol (PROVENTIL HFA, VENTOLIN HFA, PROAIR HFA) 90 mcg/actuation inhaler Take 2 Puffs by inhalation every four (4) hours as needed for Wheezing. gabapentin (NEURONTIN) 100 mg capsule TAKE 1 CAPSULE BY MOUTH THREE TIMES DAILY MAX  DAILY  AMOUNT  300MG    losartan (COZAAR) 100 mg tablet Take 1 Tablet by mouth daily. multivitamin (ONE A DAY) tablet Take 1 Tab by mouth daily. pantoprazole (PROTONIX) 40 mg tablet Take 40 mg by mouth daily. levothyroxine (SYNTHROID) 175 mcg tablet Take 175 mcg by mouth Daily (before breakfast). No current facility-administered medications for this visit. Physical Examination: General appearance - alert, well appearing, and in no distress  Mental status - alert, oriented to person, place, and time      Assessment/ Plan:   Diagnoses and all orders for this visit:    1.  Encounter for monitoring Coumadin therapy  -     AMB POC PT/INR  @ goal no change recheck 1 month  2. Needs flu shot  -     INFLUENZA, FLUAD, (AGE 65 Y+), IM, PF, 0.5 ML     Follow-up and Dispositions    Return in about 4 weeks (around 11/3/2022), or if symptoms worsen or fail to improve. I have discussed the diagnosis with the patient and the intended plan as seen in the above orders. The patient has received an after-visit summary and questions were answered concerning future plans. Pt conveyed understanding of plan.     Medication Side Effects and Warnings were discussed with patient    An electronic signature was used to authenticate this note  Kaden Parent, DO

## 2022-10-06 NOTE — PROGRESS NOTES
Chief Complaint   Patient presents with    Follow-up     INR       Patient presents in office today for INR check. Would like to get a flu shot. No concerns. Pt / caregiver given opportunity to review vaccine information sheet prior to vaccine administration. Opportunity given for questions and concerns. No questions or concerns at this time. 1. Have you been to the ER, urgent care clinic since your last visit? Hospitalized since your last visit? No    2. Have you seen or consulted any other health care providers outside of the [address] [place] [address] since your last visit? Include any pap smears or colon screening.  No    Learning Assessment 2/1/2018   PRIMARY LEARNER Patient   HIGHEST LEVEL OF EDUCATION - PRIMARY LEARNER  GRADUATED HIGH SCHOOL OR GED   BARRIERS PRIMARY LEARNER NONE   CO-LEARNER CAREGIVER No   PRIMARY LANGUAGE ENGLISH   LEARNER PREFERENCE PRIMARY DEMONSTRATION   ANSWERED BY patient   RELATIONSHIP SELF

## 2022-10-06 NOTE — PATIENT INSTRUCTIONS
A Healthy Lifestyle: Care Instructions  Your Care Instructions     A healthy lifestyle can help you feel good, stay at a healthy weight, and have plenty of energy for both work and play. A healthy lifestyle is something you can share with your whole family. A healthy lifestyle also can lower your risk for serious health problems, such as high blood pressure, heart disease, and diabetes. You can follow a few steps listed below to improve your health and the health of your family. Follow-up care is a key part of your treatment and safety. Be sure to make and go to all appointments, and call your doctor if you are having problems. It's also a good idea to know your test results and keep a list of the medicines you take. How can you care for yourself at home? Do not eat too much sugar, fat, or fast foods. You can still have dessert and treats now and then. The goal is moderation. Start small to improve your eating habits. Pay attention to portion sizes, drink less juice and soda pop, and eat more fruits and vegetables. Eat a healthy amount of food. A 3-ounce serving of meat, for example, is about the size of a deck of cards. Fill the rest of your plate with vegetables and whole grains. Limit the amount of soda and sports drinks you have every day. Drink more water when you are thirsty. Eat plenty of fruits and vegetables every day. Have an apple or some carrot sticks as an afternoon snack instead of a candy bar. Try to have fruits and/or vegetables at every meal.  Make exercise part of your daily routine. You may want to start with simple activities, such as walking, bicycling, or slow swimming. Try to be active 30 to 60 minutes every day. You do not need to do all 30 to 60 minutes all at once. For example, you can exercise 3 times a day for 10 or 20 minutes. Moderate exercise is safe for most people, but it is always a good idea to talk to your doctor before starting an exercise program.  Keep moving.  Mow the lawn, work in the garden, or [employer]. Take the stairs instead of the elevator at work. If you smoke, quit. People who smoke have an increased risk for heart attack, stroke, cancer, and other lung illnesses. Quitting is hard, but there are ways to boost your chance of quitting tobacco for good. Use nicotine gum, patches, or lozenges. Ask your doctor about stop-smoking programs and medicines. Keep trying. In addition to reducing your risk of diseases in the future, you will notice some benefits soon after you stop using tobacco. If you have shortness of breath or asthma symptoms, they will likely get better within a few weeks after you quit. Limit how much alcohol you drink. Moderate amounts of alcohol (up to 2 drinks a day for men, 1 drink a day for women) are okay. But drinking too much can lead to liver problems, high blood pressure, and other health problems. Family health  If you have a family, there are many things you can do together to improve your health. Eat meals together as a family as often as possible. Eat healthy foods. This includes fruits, vegetables, lean meats and dairy, and whole grains. Include your family in your fitness plan. Most people think of activities such as jogging or tennis as the way to fitness, but there are many ways you and your family can be more active. Anything that makes you breathe hard and gets your heart pumping is exercise. Here are some tips:  Walk to do errands or to take your child to school or the bus. Go for a family bike ride after dinner instead of watching TV. Where can you learn more? Go to http://www.gray.com/  Enter D886 in the search box to learn more about \"A Healthy Lifestyle: Care Instructions. \"  Current as of: June 16, 2021               Content Version: 13.2  © 3830-8973 Healthwise[employer].    Care instructions adapted under license by [employer] (which disclaims liability or warranty for this information). If you have questions about a medical condition or this instruction, always ask your healthcare professional. [place] [address] any warranty or liability for your use of this information. Vaccine Information Statement    Influenza (Flu) Vaccine (Inactivated or Recombinant): What You Need to Know    Many vaccine information statements are available in [place] and other languages. See www.immunize.org/vis. Hojas de información sobre vacunas están disponibles en español y en muchos otros idiomas. Visite www.immunize.org/vis. 1. Why get vaccinated? Influenza vaccine can prevent influenza (flu). Flu is a contagious disease that spreads around the [place] every year, usually between October and May. Anyone can get the flu, but it is more dangerous for some people. Infants and young children, people 72 years and older, pregnant people, and people with certain health conditions or a weakened immune system are at greatest risk of flu complications. Pneumonia, bronchitis, sinus infections, and ear infections are examples of flu-related complications. If you have a medical condition, such as heart disease, cancer, or diabetes, flu can make it worse. Flu can cause fever and chills, sore throat, muscle aches, fatigue, cough, headache, and runny or stuffy nose. Some people may have vomiting and diarrhea, though this is more common in children than adults. In an average year, thousands of people in the [facility] die from flu, and many more are hospitalized. Flu vaccine prevents millions of illnesses and flu-related visits to the doctor each year. 2. Influenza vaccines     CDC recommends everyone 6 months and older get vaccinated every flu season. Children 6 months through 6years of age may need 2 doses during a single flu season. Everyone else needs only 1 dose each flu season. It takes about 2 weeks for protection to develop after vaccination.     There are many flu viruses, and they are always changing. Each year a new flu vaccine is made to protect against the influenza viruses believed to be likely to cause disease in the upcoming flu season. Even when the vaccine doesnt exactly match these viruses, it may still provide some protection. Influenza vaccine does not cause flu. Influenza vaccine may be given at the same time as other vaccines. 3. Talk with your health care provider    Tell your vaccination provider if the person getting the vaccine:  Has had an allergic reaction after a previous dose of influenza vaccine, or has any severe, life-threatening allergies   Has ever had Guillain-Barré Syndrome (also called GBS)    In some cases, your health care provider may decide to postpone influenza vaccination until a future visit. Influenza vaccine can be administered at any time during pregnancy. People who are or will be pregnant during influenza season should receive inactivated influenza vaccine. People with minor illnesses, such as a cold, may be vaccinated. People who are moderately or severely ill should usually wait until they recover before getting influenza vaccine. Your health care provider can give you more information. 4. Risks of a vaccine reaction    Soreness, redness, and swelling where the shot is given, fever, muscle aches, and headache can happen after influenza vaccination. There may be a very small increased risk of Guillain-Barré Syndrome (GBS) after inactivated influenza vaccine (the flu shot). Deb Barrett children who get the flu shot along with pneumococcal vaccine (PCV13) and/or DTaP vaccine at the same time might be slightly more likely to have a seizure caused by fever. Tell your health care provider if a child who is getting flu vaccine has ever had a seizure. People sometimes faint after medical procedures, including vaccination.  Tell your provider if you feel dizzy or have vision changes or ringing in the ears.    As with any medicine, there is a very remote chance of a vaccine causing a severe allergic reaction, other serious injury, or death. 5. What if there is a serious problem? An allergic reaction could occur after the vaccinated person leaves the clinic. If you see signs of a severe allergic reaction (hives, swelling of the face and throat, difficulty breathing, a fast heartbeat, dizziness, or weakness), call 9-1-1 and get the person to the nearest hospital.    For other signs that concern you, call your health care provider. Adverse reactions should be reported to the Vaccine Adverse Event Reporting System (VAERS). Your health care provider will usually file this report, or you can do it yourself. Visit the VAERS website at www.vaers. hhs.gov or call 6-588.165.6101. VAERS is only for reporting reactions, and VAERS staff members do not give medical advice. 6. The National Vaccine Injury Compensation Program    The [facility] Vaccine Injury Compensation Program (VICP) is a federal program that was created to compensate people who may have been injured by certain vaccines. Claims regarding alleged injury or death due to vaccination have a time limit for filing, which may be as short as two years. Visit the VICP website at www.[facility].gov/vaccinecompensation or call 5-965.453.8362 to learn about the program and about filing a claim. 7. How can I learn more? Ask your health care provider. Call your local or state health department. Visit the website of the Food and Drug Administration (FDA) for vaccine package inserts and additional information at www.fda.gov/vaccines-blood-biologics/vaccines. Contact the Centers for Disease Control and Prevention (CDC): Call 4-189.153.9491 (1-800-CDC-INFO) or  Visit CDCs influenza website at www.cdc.gov/flu. Vaccine Information Statement   Inactivated Influenza Vaccine   8/6/2021  [address] [id number]     [facility] Disease Control and Prevention    Office Use Only

## 2022-10-17 RX ORDER — TAMSULOSIN HYDROCHLORIDE 0.4 MG/1
0.4 CAPSULE ORAL EVERY EVENING
Qty: 60 CAPSULE | Refills: 2 | Status: SHIPPED | OUTPATIENT
Start: 2022-10-17 | End: 2022-11-10

## 2022-10-25 ENCOUNTER — TRANSCRIBE ORDER (OUTPATIENT)
Dept: SCHEDULING | Age: 72
End: 2022-10-25

## 2022-10-25 DIAGNOSIS — C34.11 CANCER OF UPPER LOBE OF RIGHT LUNG (HCC): Primary | ICD-10-CM

## 2022-11-10 ENCOUNTER — OFFICE VISIT (OUTPATIENT)
Dept: FAMILY MEDICINE CLINIC | Age: 72
End: 2022-11-10
Payer: MEDICARE

## 2022-11-10 VITALS
HEIGHT: 68 IN | OXYGEN SATURATION: 96 % | HEART RATE: 71 BPM | BODY MASS INDEX: 22.28 KG/M2 | TEMPERATURE: 97.8 F | DIASTOLIC BLOOD PRESSURE: 78 MMHG | SYSTOLIC BLOOD PRESSURE: 124 MMHG | RESPIRATION RATE: 18 BRPM | WEIGHT: 147 LBS

## 2022-11-10 DIAGNOSIS — R35.1 BENIGN PROSTATIC HYPERPLASIA WITH NOCTURIA: ICD-10-CM

## 2022-11-10 DIAGNOSIS — Z51.81 ENCOUNTER FOR MONITORING COUMADIN THERAPY: Primary | ICD-10-CM

## 2022-11-10 DIAGNOSIS — Z79.01 ENCOUNTER FOR MONITORING COUMADIN THERAPY: Primary | ICD-10-CM

## 2022-11-10 DIAGNOSIS — N40.1 BENIGN PROSTATIC HYPERPLASIA WITH NOCTURIA: ICD-10-CM

## 2022-11-10 LAB
INR BLD: 3
PT POC: 35.5 SECONDS
VALID INTERNAL CONTROL?: YES

## 2022-11-10 PROCEDURE — 1123F ACP DISCUSS/DSCN MKR DOCD: CPT | Performed by: FAMILY MEDICINE

## 2022-11-10 PROCEDURE — G8427 DOCREV CUR MEDS BY ELIG CLIN: HCPCS | Performed by: FAMILY MEDICINE

## 2022-11-10 PROCEDURE — G8420 CALC BMI NORM PARAMETERS: HCPCS | Performed by: FAMILY MEDICINE

## 2022-11-10 PROCEDURE — G8752 SYS BP LESS 140: HCPCS | Performed by: FAMILY MEDICINE

## 2022-11-10 PROCEDURE — 3078F DIAST BP <80 MM HG: CPT | Performed by: FAMILY MEDICINE

## 2022-11-10 PROCEDURE — 85610 PROTHROMBIN TIME: CPT | Performed by: FAMILY MEDICINE

## 2022-11-10 PROCEDURE — 3017F COLORECTAL CA SCREEN DOC REV: CPT | Performed by: FAMILY MEDICINE

## 2022-11-10 PROCEDURE — G8754 DIAS BP LESS 90: HCPCS | Performed by: FAMILY MEDICINE

## 2022-11-10 PROCEDURE — G8536 NO DOC ELDER MAL SCRN: HCPCS | Performed by: FAMILY MEDICINE

## 2022-11-10 PROCEDURE — G0463 HOSPITAL OUTPT CLINIC VISIT: HCPCS | Performed by: FAMILY MEDICINE

## 2022-11-10 PROCEDURE — 3074F SYST BP LT 130 MM HG: CPT | Performed by: FAMILY MEDICINE

## 2022-11-10 PROCEDURE — 99214 OFFICE O/P EST MOD 30 MIN: CPT | Performed by: FAMILY MEDICINE

## 2022-11-10 PROCEDURE — G8510 SCR DEP NEG, NO PLAN REQD: HCPCS | Performed by: FAMILY MEDICINE

## 2022-11-10 PROCEDURE — 1101F PT FALLS ASSESS-DOCD LE1/YR: CPT | Performed by: FAMILY MEDICINE

## 2022-11-10 RX ORDER — TAMSULOSIN HYDROCHLORIDE 0.4 MG/1
0.8 CAPSULE ORAL DAILY
Qty: 60 CAPSULE | Refills: 0
Start: 2022-11-10

## 2022-11-10 NOTE — PROGRESS NOTES
Progress Note    he is a 67y.o. year old male who presents for evalution. Subjective:     Pt here for INR check and he just finished radiation therapy for his prostate cancer. Next Thursday he will have the IVC filter removed. He will have PSA checked in 3 months. He is off the gabapentin. He is taking the flomax once daily at 0.4mg daily and not helping. Will increase. Coumadin level is at goal at 3.0,jacqueline continue with current regimen. Reviewed PmHx, RxHx, FmHx, SocHx, AllgHx and updated and dated in the chart. Review of Systems - negative except as listed above in the HPI    Objective:     Vitals:    11/10/22 0842   BP: 124/78   Pulse: 71   Resp: 18   Temp: 97.8 °F (36.6 °C)   TempSrc: Oral   SpO2: 96%   Weight: 147 lb (66.7 kg)   Height: 5' 8\" (1.727 m)       Current Outpatient Medications   Medication Sig    tamsulosin (FLOMAX) 0.4 mg capsule Take 2 Capsules by mouth daily. calcium carbonate (CALCIUM 300 PO) Take  by mouth.    warfarin (COUMADIN) 5 mg tablet Take 7.5mg Saturday and 5mg all other days    albuterol (PROVENTIL HFA, VENTOLIN HFA, PROAIR HFA) 90 mcg/actuation inhaler Take 2 Puffs by inhalation every four (4) hours as needed for Wheezing. losartan (COZAAR) 100 mg tablet Take 1 Tablet by mouth daily. multivitamin (ONE A DAY) tablet Take 1 Tab by mouth daily. pantoprazole (PROTONIX) 40 mg tablet Take 40 mg by mouth daily. levothyroxine (SYNTHROID) 175 mcg tablet Take 175 mcg by mouth Daily (before breakfast). No current facility-administered medications for this visit. Physical Examination: General appearance - alert, well appearing, and in no distress  Chest - clear to auscultation, no wheezes, rales or rhonchi, symmetric air entry  Heart - normal rate, regular rhythm, normal S1, S2, no murmurs, rubs, clicks or gallops      Assessment/ Plan:   Diagnoses and all orders for this visit:    1.  Encounter for monitoring Coumadin therapy  -     AMB POC PT/INR  At goal he will just take a 5 this Saturday then restart his usual regimen and recheck in   2. Benign prostatic hyperplasia with nocturia  -     tamsulosin (FLOMAX) 0.4 mg capsule; Take 2 Capsules by mouth daily. Follow-up and Dispositions    Return in about 4 weeks (around 12/8/2022), or if symptoms worsen or fail to improve, for coumadin check. I have discussed the diagnosis with the patient and the intended plan as seen in the above orders. The patient has received an after-visit summary and questions were answered concerning future plans. Pt conveyed understanding of plan.     Medication Side Effects and Warnings were discussed with patient    An electronic signature was used to authenticate this note  Elizabet Vaughan,

## 2022-11-10 NOTE — PROGRESS NOTES
Chief Complaint   Patient presents with    Follow-up     INR       Patient present sin office today for INR check. No concerns. 1. Have you been to the ER, urgent care clinic since your last visit? Hospitalized since your last visit? No    2. Have you seen or consulted any other health care providers outside of the [address] [place] [address] since your last visit? Include any pap smears or colon screening.  No      Learning Assessment 2/1/2018   PRIMARY LEARNER Patient   HIGHEST LEVEL OF EDUCATION - PRIMARY LEARNER  GRADUATED HIGH SCHOOL OR GED   BARRIERS PRIMARY LEARNER NONE   CO-LEARNER CAREGIVER No   PRIMARY LANGUAGE ENGLISH   LEARNER PREFERENCE PRIMARY DEMONSTRATION   ANSWERED BY patient   RELATIONSHIP SELF

## 2022-11-10 NOTE — PATIENT INSTRUCTIONS

## 2022-11-15 ENCOUNTER — OFFICE VISIT (OUTPATIENT)
Dept: FAMILY MEDICINE CLINIC | Age: 72
End: 2022-11-15
Payer: MEDICARE

## 2022-11-15 VITALS
HEIGHT: 68 IN | WEIGHT: 148 LBS | TEMPERATURE: 97.6 F | OXYGEN SATURATION: 95 % | SYSTOLIC BLOOD PRESSURE: 151 MMHG | BODY MASS INDEX: 22.43 KG/M2 | HEART RATE: 69 BPM | DIASTOLIC BLOOD PRESSURE: 88 MMHG | RESPIRATION RATE: 16 BRPM

## 2022-11-15 DIAGNOSIS — L02.224 BOIL, GROIN: Primary | ICD-10-CM

## 2022-11-15 PROCEDURE — 99213 OFFICE O/P EST LOW 20 MIN: CPT | Performed by: FAMILY MEDICINE

## 2022-11-15 PROCEDURE — G8754 DIAS BP LESS 90: HCPCS | Performed by: FAMILY MEDICINE

## 2022-11-15 PROCEDURE — 3074F SYST BP LT 130 MM HG: CPT | Performed by: FAMILY MEDICINE

## 2022-11-15 PROCEDURE — G0463 HOSPITAL OUTPT CLINIC VISIT: HCPCS | Performed by: FAMILY MEDICINE

## 2022-11-15 PROCEDURE — G8753 SYS BP > OR = 140: HCPCS | Performed by: FAMILY MEDICINE

## 2022-11-15 PROCEDURE — 3017F COLORECTAL CA SCREEN DOC REV: CPT | Performed by: FAMILY MEDICINE

## 2022-11-15 PROCEDURE — G8427 DOCREV CUR MEDS BY ELIG CLIN: HCPCS | Performed by: FAMILY MEDICINE

## 2022-11-15 PROCEDURE — G8420 CALC BMI NORM PARAMETERS: HCPCS | Performed by: FAMILY MEDICINE

## 2022-11-15 PROCEDURE — 1123F ACP DISCUSS/DSCN MKR DOCD: CPT | Performed by: FAMILY MEDICINE

## 2022-11-15 PROCEDURE — 3078F DIAST BP <80 MM HG: CPT | Performed by: FAMILY MEDICINE

## 2022-11-15 PROCEDURE — G8536 NO DOC ELDER MAL SCRN: HCPCS | Performed by: FAMILY MEDICINE

## 2022-11-15 PROCEDURE — 1101F PT FALLS ASSESS-DOCD LE1/YR: CPT | Performed by: FAMILY MEDICINE

## 2022-11-15 PROCEDURE — G8510 SCR DEP NEG, NO PLAN REQD: HCPCS | Performed by: FAMILY MEDICINE

## 2022-11-15 RX ORDER — DOXYCYCLINE 100 MG/1
100 TABLET ORAL 2 TIMES DAILY
Qty: 20 TABLET | Refills: 0 | Status: SHIPPED | OUTPATIENT
Start: 2022-11-15 | End: 2022-11-25

## 2022-11-15 NOTE — PROGRESS NOTES
Progress Note    he is a 67y.o. year old male who presents for evalution. Subjective:     Pt states on Sunday he had a cyst pop up in R  of his groin. No f/c. Not taking anything for this currently. Just pain in region, not draining. Reviewed PmHx, RxHx, FmHx, SocHx, AllgHx and updated and dated in the chart. Review of Systems - negative except as listed above in the HPI    Objective:     Vitals:    11/15/22 0828   BP: (!) 151/88   Pulse: 69   Resp: 16   Temp: 97.6 °F (36.4 °C)   TempSrc: Oral   SpO2: 95%   Weight: 148 lb (67.1 kg)   Height: 5' 8\" (1.727 m)       Current Outpatient Medications   Medication Sig    doxycycline (ADOXA) 100 mg tablet Take 1 Tablet by mouth two (2) times a day for 10 days. tamsulosin (FLOMAX) 0.4 mg capsule Take 2 Capsules by mouth daily. calcium carbonate (CALCIUM 300 PO) Take  by mouth.    warfarin (COUMADIN) 5 mg tablet Take 7.5mg Saturday and 5mg all other days    albuterol (PROVENTIL HFA, VENTOLIN HFA, PROAIR HFA) 90 mcg/actuation inhaler Take 2 Puffs by inhalation every four (4) hours as needed for Wheezing. losartan (COZAAR) 100 mg tablet Take 1 Tablet by mouth daily. multivitamin (ONE A DAY) tablet Take 1 Tab by mouth daily. pantoprazole (PROTONIX) 40 mg tablet Take 40 mg by mouth daily. levothyroxine (SYNTHROID) 175 mcg tablet Take 175 mcg by mouth Daily (before breakfast). No current facility-administered medications for this visit. Physical Examination: General appearance - alert, well appearing, and in no distress  Skin - boil R side of groin      Assessment/ Plan:   Diagnoses and all orders for this visit:    1. Boil, groin  -     doxycycline (ADOXA) 100 mg tablet; Take 1 Tablet by mouth two (2) times a day for 10 days. He is scheduled for IVC removal of on Thursday. He will contact [facility] and see how they would like for him to proceed discussed it would likely reschedule.   Follow-up and Dispositions    Return if symptoms worsen or fail to improve. I have discussed the diagnosis with the patient and the intended plan as seen in the above orders. The patient has received an after-visit summary and questions were answered concerning future plans. Pt conveyed understanding of plan.     Medication Side Effects and Warnings were discussed with patient    An electronic signature was used to authenticate this note  Latrell Alexander DO

## 2022-11-15 NOTE — PROGRESS NOTES
Chief Complaint   Patient presents with    Skin Problem     Patient presents in office today with c/o a boil that has become infected on the right side of his groin. States that it is extremely painful. States that it is right where his hernia surgery was done. No other concerns. 1. Have you been to the ER, urgent care clinic since your last visit? Hospitalized since your last visit? No    2. Have you seen or consulted any other health care providers outside of the [address] [place] [address] since your last visit? Include any pap smears or colon screening.  No    Learning Assessment 2/1/2018   PRIMARY LEARNER Patient   HIGHEST LEVEL OF EDUCATION - PRIMARY LEARNER  GRADUATED HIGH SCHOOL OR GED   BARRIERS PRIMARY LEARNER NONE   CO-LEARNER CAREGIVER No   PRIMARY LANGUAGE ENGLISH   LEARNER PREFERENCE PRIMARY DEMONSTRATION   ANSWERED BY patient   RELATIONSHIP SELF

## 2022-11-15 NOTE — PATIENT INSTRUCTIONS

## 2022-12-08 ENCOUNTER — OFFICE VISIT (OUTPATIENT)
Dept: FAMILY MEDICINE CLINIC | Age: 72
End: 2022-12-08
Payer: MEDICARE

## 2022-12-08 VITALS
SYSTOLIC BLOOD PRESSURE: 150 MMHG | OXYGEN SATURATION: 96 % | WEIGHT: 147 LBS | TEMPERATURE: 97.7 F | BODY MASS INDEX: 22.28 KG/M2 | HEIGHT: 68 IN | RESPIRATION RATE: 16 BRPM | DIASTOLIC BLOOD PRESSURE: 92 MMHG | HEART RATE: 63 BPM

## 2022-12-08 DIAGNOSIS — L02.224 BOIL, GROIN: ICD-10-CM

## 2022-12-08 DIAGNOSIS — Z79.01 ENCOUNTER FOR MONITORING COUMADIN THERAPY: Primary | ICD-10-CM

## 2022-12-08 DIAGNOSIS — Z51.81 ENCOUNTER FOR MONITORING COUMADIN THERAPY: Primary | ICD-10-CM

## 2022-12-08 LAB
INR BLD: 3
PT POC: 36 SECONDS
VALID INTERNAL CONTROL?: YES

## 2022-12-08 PROCEDURE — G0463 HOSPITAL OUTPT CLINIC VISIT: HCPCS | Performed by: FAMILY MEDICINE

## 2022-12-08 PROCEDURE — 85610 PROTHROMBIN TIME: CPT | Performed by: FAMILY MEDICINE

## 2022-12-08 RX ORDER — MUPIROCIN 20 MG/G
OINTMENT TOPICAL 3 TIMES DAILY
Qty: 30 G | Refills: 0 | Status: SHIPPED | OUTPATIENT
Start: 2022-12-08

## 2022-12-08 RX ORDER — DOXYCYCLINE 100 MG/1
100 TABLET ORAL 2 TIMES DAILY
Qty: 20 TABLET | Refills: 0 | Status: SHIPPED | OUTPATIENT
Start: 2022-12-08 | End: 2022-12-18

## 2022-12-08 NOTE — PROGRESS NOTES
Progress Note    he is a 67y.o. year old male who presents for evalution. Subjective:     Here for INR check and currently therapeutic at 3.0 and is taking 5 all days except Saturday taking 7.5. His IVC filter was removed. Had a stress test done and will be getting echo next week. Apparently small abnormality with stress he is in contact [facility]. Groin boil is better but not completely. No f/c. Reviewed PmHx, RxHx, FmHx, SocHx, AllgHx and updated and dated in the chart. Review of Systems - negative except as listed above in the HPI    Objective:     Vitals:    12/08/22 0846 12/08/22 0932   BP: (!) 147/91 (!) 150/92   Pulse: 63    Resp: 16    Temp: 97.7 °F (36.5 °C)    TempSrc: Oral    SpO2: 96%    Weight: 147 lb (66.7 kg)    Height: 5' 8\" (1.727 m)        Current Outpatient Medications   Medication Sig    doxycycline (ADOXA) 100 mg tablet Take 1 Tablet by mouth two (2) times a day for 10 days. mupirocin (BACTROBAN) 2 % ointment Apply  to affected area three (3) times daily. calcium carbonate (CALCIUM 300 PO) Take  by mouth.    warfarin (COUMADIN) 5 mg tablet Take 7.5mg Saturday and 5mg all other days    albuterol (PROVENTIL HFA, VENTOLIN HFA, PROAIR HFA) 90 mcg/actuation inhaler Take 2 Puffs by inhalation every four (4) hours as needed for Wheezing. losartan (COZAAR) 100 mg tablet Take 1 Tablet by mouth daily. multivitamin (ONE A DAY) tablet Take 1 Tab by mouth daily. pantoprazole (PROTONIX) 40 mg tablet Take 40 mg by mouth daily. levothyroxine (SYNTHROID) 175 mcg tablet Take 175 mcg by mouth Daily (before breakfast). tamsulosin (FLOMAX) 0.4 mg capsule Take 2 Capsules by mouth daily. (Patient not taking: Reported on 12/8/2022)     No current facility-administered medications for this visit.        Physical Examination: General appearance - alert, well appearing, and in no distress  Mental status - alert, oriented to person, place, and time Assessment/ Plan:   Diagnoses and all orders for this visit:    1. Encounter for monitoring Coumadin therapy  -     AMB POC PT/INR    2. Boil, groin  -     doxycycline (ADOXA) 100 mg tablet; Take 1 Tablet by mouth two (2) times a day for 10 days.  -     mupirocin (BACTROBAN) 2 % ointment; Apply  to affected area three (3) times daily. Follow-up and Dispositions    Return in about 4 weeks (around 1/5/2023), or if symptoms worsen or fail to improve. I have discussed the diagnosis with the patient and the intended plan as seen in the above orders. The patient has received an after-visit summary and questions were answered concerning future plans. Pt conveyed understanding of plan.     Medication Side Effects and Warnings were discussed with patient    An electronic signature was used to authenticate this note  Luna Mejia DO

## 2022-12-08 NOTE — PATIENT INSTRUCTIONS

## 2022-12-08 NOTE — PROGRESS NOTES
Chief Complaint   Patient presents with    Follow-up     INR       Patient presents in office today for INR check. States that the cyst is still there and still red. No other concerns. 1. Have you been to the ER, urgent care clinic since your last visit? Hospitalized since your last visit? No    2. Have you seen or consulted any other health care providers outside of the [address] [place] [address] since your last visit? Include any pap smears or colon screening.  No    Learning Assessment 2/1/2018   PRIMARY LEARNER Patient   HIGHEST LEVEL OF EDUCATION - PRIMARY LEARNER  GRADUATED HIGH SCHOOL OR GED   BARRIERS PRIMARY LEARNER NONE   CO-LEARNER CAREGIVER No   PRIMARY LANGUAGE ENGLISH   LEARNER PREFERENCE PRIMARY DEMONSTRATION   ANSWERED BY patient   RELATIONSHIP SELF

## 2023-01-11 ENCOUNTER — OFFICE VISIT (OUTPATIENT)
Dept: FAMILY MEDICINE CLINIC | Age: 73
End: 2023-01-11
Payer: MEDICARE

## 2023-01-11 VITALS
DIASTOLIC BLOOD PRESSURE: 92 MMHG | RESPIRATION RATE: 16 BRPM | OXYGEN SATURATION: 96 % | HEART RATE: 58 BPM | TEMPERATURE: 97.6 F | HEIGHT: 68 IN | SYSTOLIC BLOOD PRESSURE: 155 MMHG | BODY MASS INDEX: 22.73 KG/M2 | WEIGHT: 150 LBS

## 2023-01-11 DIAGNOSIS — I26.99 BILATERAL PULMONARY EMBOLISM (HCC): ICD-10-CM

## 2023-01-11 DIAGNOSIS — Z51.81 ENCOUNTER FOR MONITORING COUMADIN THERAPY: Primary | ICD-10-CM

## 2023-01-11 DIAGNOSIS — Z79.01 ENCOUNTER FOR MONITORING COUMADIN THERAPY: Primary | ICD-10-CM

## 2023-01-11 DIAGNOSIS — D68.59 HYPERCOAGULABLE STATE (HCC): ICD-10-CM

## 2023-01-11 DIAGNOSIS — J43.2 CENTRILOBULAR EMPHYSEMA (HCC): ICD-10-CM

## 2023-01-11 DIAGNOSIS — I26.09 OTHER ACUTE PULMONARY EMBOLISM WITH ACUTE COR PULMONALE (HCC): ICD-10-CM

## 2023-01-11 DIAGNOSIS — C34.11 CANCER OF UPPER LOBE OF RIGHT LUNG (HCC): ICD-10-CM

## 2023-01-11 LAB
INR BLD: 2.5
PT POC: 30 SECONDS
VALID INTERNAL CONTROL?: YES

## 2023-01-11 PROCEDURE — G0463 HOSPITAL OUTPT CLINIC VISIT: HCPCS | Performed by: FAMILY MEDICINE

## 2023-01-11 PROCEDURE — 85610 PROTHROMBIN TIME: CPT | Performed by: FAMILY MEDICINE

## 2023-01-11 NOTE — PATIENT INSTRUCTIONS

## 2023-01-11 NOTE — PROGRESS NOTES
Chief Complaint   Patient presents with    Follow-up     INR     Patient presents in office today for INR check. No concerns. 1. Have you been to the ER, urgent care clinic since your last visit? Hospitalized since your last visit? No    2. Have you seen or consulted any other health care providers outside of the [address] [place] [address] since your last visit? Include any pap smears or colon screening.  No      Learning Assessment 2/1/2018   PRIMARY LEARNER Patient   HIGHEST LEVEL OF EDUCATION - PRIMARY LEARNER  GRADUATED HIGH SCHOOL OR GED   BARRIERS PRIMARY LEARNER NONE   CO-LEARNER CAREGIVER No   PRIMARY LANGUAGE ENGLISH   LEARNER PREFERENCE PRIMARY DEMONSTRATION   ANSWERED BY patient   RELATIONSHIP SELF

## 2023-01-11 NOTE — PROGRESS NOTES
Progress Note    he is a 67y.o. year old male who presents for evalution. Subjective:     Pt had a stress test which showed minor abnormality. He has since seen cardiology and was told no valve issues and was told something non reversible and nothing to do for it. He will send me a copy of the report. On further discussion the heart is very close to the diaphragm and seems to be making it look like a fixed defect. Having CT abdomen next week due to discomfort where hernia was, ordered by surgeon. Has appt in march with endo for his thyroid. Reviewed PmHx, RxHx, FmHx, SocHx, AllgHx and updated and dated in the chart. Review of Systems - negative except as listed above in the HPI    Objective:     Vitals:    01/11/23 0850   BP: (!) 155/92   Pulse: (!) 58   Resp: 16   Temp: 97.6 °F (36.4 °C)   TempSrc: Oral   SpO2: 96%   Weight: 150 lb (68 kg)   Height: 5' 8\" (1.727 m)       Current Outpatient Medications   Medication Sig    mupirocin (BACTROBAN) 2 % ointment Apply  to affected area three (3) times daily. warfarin (COUMADIN) 5 mg tablet Take 7.5mg Saturday and 5mg all other days    albuterol (PROVENTIL HFA, VENTOLIN HFA, PROAIR HFA) 90 mcg/actuation inhaler Take 2 Puffs by inhalation every four (4) hours as needed for Wheezing. losartan (COZAAR) 100 mg tablet Take 1 Tablet by mouth daily. multivitamin (ONE A DAY) tablet Take 1 Tab by mouth daily. pantoprazole (PROTONIX) 40 mg tablet Take 40 mg by mouth daily. levothyroxine (SYNTHROID) 175 mcg tablet Take 175 mcg by mouth Daily (before breakfast). tamsulosin (FLOMAX) 0.4 mg capsule Take 2 Capsules by mouth daily. (Patient not taking: No sig reported)    calcium carbonate (CALCIUM 300 PO) Take  by mouth. (Patient not taking: Reported on 1/11/2023)     No current facility-administered medications for this visit.        Physical Examination: General appearance - alert, well appearing, and in no distress  Mental status - alert, oriented to person, place, and time      Assessment/ Plan:   Diagnoses and all orders for this visit:    1. Encounter for monitoring Coumadin therapy  -     AMB POC PT/INR    2. Centrilobular emphysema (HCC)  Followed by pulm  3. Cancer of upper lobe of right lung ([facility]  Ct scan in march,   4. Bilateral pulmonary embolism (HCC)  On chronic coumadin at goal  5. Hypercoagulable state ([facility]  @ goal on coumadin 2.5  6. Other acute pulmonary embolism with acute cor pulmonale (HCC)   Chronic coumadin. Follow-up and Dispositions    Return in about 4 weeks (around 2/8/2023), or if symptoms worsen or fail to improve, for INR check. I have discussed the diagnosis with the patient and the intended plan as seen in the above orders. The patient has received an after-visit summary and questions were answered concerning future plans. Pt conveyed understanding of plan.     Medication Side Effects and Warnings were discussed with patient    An electronic signature was used to authenticate this note  Beth Juarez DO

## 2023-02-13 ENCOUNTER — HOSPITAL ENCOUNTER (OUTPATIENT)
Dept: RADIATION THERAPY | Age: 73
Discharge: HOME | End: 2023-02-13

## 2023-02-15 ENCOUNTER — OFFICE VISIT (OUTPATIENT)
Dept: FAMILY MEDICINE CLINIC | Age: 73
End: 2023-02-15
Payer: MEDICARE

## 2023-02-15 VITALS
WEIGHT: 148.6 LBS | HEART RATE: 65 BPM | HEIGHT: 68 IN | DIASTOLIC BLOOD PRESSURE: 88 MMHG | BODY MASS INDEX: 22.52 KG/M2 | SYSTOLIC BLOOD PRESSURE: 136 MMHG | TEMPERATURE: 97.6 F | RESPIRATION RATE: 20 BRPM | OXYGEN SATURATION: 99 %

## 2023-02-15 DIAGNOSIS — D68.9 COAGULATION DISORDER (HCC): Primary | ICD-10-CM

## 2023-02-15 LAB
INR BLD: 2.1
PT POC: 25 SECONDS
VALID INTERNAL CONTROL?: YES

## 2023-02-15 PROCEDURE — G8510 SCR DEP NEG, NO PLAN REQD: HCPCS | Performed by: FAMILY MEDICINE

## 2023-02-15 PROCEDURE — 3075F SYST BP GE 130 - 139MM HG: CPT | Performed by: FAMILY MEDICINE

## 2023-02-15 PROCEDURE — 99213 OFFICE O/P EST LOW 20 MIN: CPT | Performed by: FAMILY MEDICINE

## 2023-02-15 PROCEDURE — 3017F COLORECTAL CA SCREEN DOC REV: CPT | Performed by: FAMILY MEDICINE

## 2023-02-15 PROCEDURE — G8427 DOCREV CUR MEDS BY ELIG CLIN: HCPCS | Performed by: FAMILY MEDICINE

## 2023-02-15 PROCEDURE — 85610 PROTHROMBIN TIME: CPT | Performed by: FAMILY MEDICINE

## 2023-02-15 PROCEDURE — G8536 NO DOC ELDER MAL SCRN: HCPCS | Performed by: FAMILY MEDICINE

## 2023-02-15 PROCEDURE — G8420 CALC BMI NORM PARAMETERS: HCPCS | Performed by: FAMILY MEDICINE

## 2023-02-15 PROCEDURE — 1101F PT FALLS ASSESS-DOCD LE1/YR: CPT | Performed by: FAMILY MEDICINE

## 2023-02-15 PROCEDURE — 3079F DIAST BP 80-89 MM HG: CPT | Performed by: FAMILY MEDICINE

## 2023-02-15 PROCEDURE — 1123F ACP DISCUSS/DSCN MKR DOCD: CPT | Performed by: FAMILY MEDICINE

## 2023-02-15 PROCEDURE — G0463 HOSPITAL OUTPT CLINIC VISIT: HCPCS | Performed by: FAMILY MEDICINE

## 2023-02-15 NOTE — PROGRESS NOTES
Identified pt with two pt identifiers(name and ). Reviewed record in preparation for visit and have obtained necessary documentation. All patient medications has been reviewed. Chief Complaint   Patient presents with    Coagulation disorder     4 wk follow up for INR       3 most recent PHQ Screens 2/15/2023   PHQ Not Done -   Little interest or pleasure in doing things Not at all   Feeling down, depressed, irritable, or hopeless Not at all   Total Score PHQ 2 0   Trouble falling or staying asleep, or sleeping too much -   Feeling tired or having little energy -   Poor appetite, weight loss, or overeating -   Feeling bad about yourself - or that you are a failure or have let yourself or your family down -   Trouble concentrating on things such as school, work, reading, or watching TV -   Moving or speaking so slowly that other people could have noticed; or the opposite being so fidgety that others notice -   Thoughts of being better off dead, or hurting yourself in some way -   PHQ 9 Score -   How difficult have these problems made it for you to do your work, take care of your home and get along with others -     Abuse Screening Questionnaire 2019   Do you ever feel afraid of your partner? N   Are you in a relationship with someone who physically or mentally threatens you? N   Is it safe for you to go home? Y       Health Maintenance Due   Topic    Shingles Vaccine (1 of 2)    Lipid Screen      Health Maintenance Review: Patient reminded of \"due or due soon\" health maintenance. I have asked the patient to contact his/her primary care provider (PCP) for follow-up on his/her health maintenance.     Vitals:    02/15/23 0845   BP: 136/88   Pulse: 65   Resp: 20   Temp: 97.6 °F (36.4 °C)   TempSrc: Oral   SpO2: 99%   Weight: 148 lb 9.6 oz (67.4 kg)   Height: 5' 8\" (1.727 m)   PainSc:   3   PainLoc: Abdomen       Wt Readings from Last 3 Encounters:   02/15/23 148 lb 9.6 oz (67.4 kg)   23 150 lb (68 kg) 12/08/22 147 lb (66.7 kg)     Temp Readings from Last 3 Encounters:   02/15/23 97.6 °F (36.4 °C) (Oral)   01/11/23 97.6 °F (36.4 °C) (Oral)   12/08/22 97.7 °F (36.5 °C) (Oral)     BP Readings from Last 3 Encounters:   02/15/23 136/88   01/11/23 (!) 155/92   12/08/22 (!) 150/92     Pulse Readings from Last 3 Encounters:   02/15/23 65   01/11/23 (!) 58   12/08/22 63       1. \"Have you been to the ER, urgent care clinic since your last visit? Hospitalized since your last visit? \" No    2. \"Have you seen or consulted any other health care providers outside of the [address] [place] [address] since your last visit? \"  No    3. For patients aged 39-70: Has the patient had a colonoscopy / FIT/ Cologuard? Yes - Care Gap present. Most recent result on file          Patient is accompanied by self I have received verbal consent from Susan Veliz to discuss any/all medical information while they are present in the room.

## 2023-02-15 NOTE — PATIENT INSTRUCTIONS

## 2023-03-15 ENCOUNTER — HOSPITAL ENCOUNTER (OUTPATIENT)
Dept: CT IMAGING | Age: 73
Discharge: HOME | End: 2023-03-15
Attending: INTERNAL MEDICINE
Payer: MEDICARE

## 2023-03-15 DIAGNOSIS — C34.11 CANCER OF UPPER LOBE OF RIGHT LUNG (HCC): ICD-10-CM

## 2023-03-15 PROCEDURE — 71250 CT THORAX DX C-: CPT

## 2023-03-16 ENCOUNTER — OFFICE VISIT (OUTPATIENT)
Dept: FAMILY MEDICINE CLINIC | Age: 73
End: 2023-03-16
Payer: MEDICARE

## 2023-03-16 VITALS
HEIGHT: 68 IN | WEIGHT: 150 LBS | HEART RATE: 60 BPM | OXYGEN SATURATION: 96 % | RESPIRATION RATE: 16 BRPM | SYSTOLIC BLOOD PRESSURE: 167 MMHG | DIASTOLIC BLOOD PRESSURE: 95 MMHG | TEMPERATURE: 97.7 F | BODY MASS INDEX: 22.73 KG/M2

## 2023-03-16 DIAGNOSIS — Z51.81 ENCOUNTER FOR MONITORING COUMADIN THERAPY: Primary | ICD-10-CM

## 2023-03-16 DIAGNOSIS — Z79.01 ENCOUNTER FOR MONITORING COUMADIN THERAPY: Primary | ICD-10-CM

## 2023-03-16 LAB
INR BLD: 3.4
PT POC: 30.4 SECONDS
VALID INTERNAL CONTROL?: YES

## 2023-03-16 PROCEDURE — 1123F ACP DISCUSS/DSCN MKR DOCD: CPT | Performed by: FAMILY MEDICINE

## 2023-03-16 PROCEDURE — 3080F DIAST BP >= 90 MM HG: CPT | Performed by: FAMILY MEDICINE

## 2023-03-16 PROCEDURE — G8420 CALC BMI NORM PARAMETERS: HCPCS | Performed by: FAMILY MEDICINE

## 2023-03-16 PROCEDURE — 3077F SYST BP >= 140 MM HG: CPT | Performed by: FAMILY MEDICINE

## 2023-03-16 PROCEDURE — G8510 SCR DEP NEG, NO PLAN REQD: HCPCS | Performed by: FAMILY MEDICINE

## 2023-03-16 PROCEDURE — 3017F COLORECTAL CA SCREEN DOC REV: CPT | Performed by: FAMILY MEDICINE

## 2023-03-16 PROCEDURE — 99213 OFFICE O/P EST LOW 20 MIN: CPT | Performed by: FAMILY MEDICINE

## 2023-03-16 PROCEDURE — G8427 DOCREV CUR MEDS BY ELIG CLIN: HCPCS | Performed by: FAMILY MEDICINE

## 2023-03-16 PROCEDURE — 1101F PT FALLS ASSESS-DOCD LE1/YR: CPT | Performed by: FAMILY MEDICINE

## 2023-03-16 PROCEDURE — G0463 HOSPITAL OUTPT CLINIC VISIT: HCPCS | Performed by: FAMILY MEDICINE

## 2023-03-16 PROCEDURE — G8536 NO DOC ELDER MAL SCRN: HCPCS | Performed by: FAMILY MEDICINE

## 2023-03-16 PROCEDURE — 85610 PROTHROMBIN TIME: CPT | Performed by: FAMILY MEDICINE

## 2023-03-16 NOTE — PROGRESS NOTES
Chief Complaint   Patient presents with    Follow-up     INR       Patient presents in office today for INR check. Would like to discuss CT scan that he had done yesterday. No other concerns. 1. Have you been to the ER, urgent care clinic since your last visit? Hospitalized since your last visit? No    2. Have you seen or consulted any other health care providers outside of the [address] [place] [address] since your last visit? Include any pap smears or colon screening.  No    Learning Assessment 2/1/2018   PRIMARY LEARNER Patient   HIGHEST LEVEL OF EDUCATION - PRIMARY LEARNER  GRADUATED HIGH SCHOOL OR GED   BARRIERS PRIMARY LEARNER NONE   CO-LEARNER CAREGIVER No   PRIMARY LANGUAGE ENGLISH   LEARNER PREFERENCE PRIMARY DEMONSTRATION   ANSWERED BY patient   RELATIONSHIP SELF

## 2023-03-16 NOTE — PROGRESS NOTES
Progress Note    he is a 67y.o. year old male who presents for evalution. Subjective:     Pt here for INR check and he is slightly elevated today at 3.4. No changes in diet. Had CT follow up for Lung Ca and no recurrence. Reviewed PmHx, RxHx, FmHx, SocHx, AllgHx and updated and dated in the chart. Review of Systems - negative except as listed above in the HPI    Objective:     Vitals:    03/16/23 0844   BP: (!) 167/95   Pulse: 60   Resp: 16   Temp: 97.7 °F (36.5 °C)   TempSrc: Oral   SpO2: 96%   Weight: 150 lb (68 kg)   Height: 5' 8\" (1.727 m)       Current Outpatient Medications   Medication Sig    warfarin (COUMADIN) 5 mg tablet Take 7.5mg Saturday and 5mg all other days    albuterol (PROVENTIL HFA, VENTOLIN HFA, PROAIR HFA) 90 mcg/actuation inhaler Take 2 Puffs by inhalation every four (4) hours as needed for Wheezing. losartan (COZAAR) 100 mg tablet Take 1 Tablet by mouth daily. multivitamin (ONE A DAY) tablet Take 1 Tab by mouth daily. pantoprazole (PROTONIX) 40 mg tablet Take 40 mg by mouth daily. levothyroxine (SYNTHROID) 175 mcg tablet Take 175 mcg by mouth Daily (before breakfast). calcium carbonate (CALCIUM 300 PO) Take  by mouth. (Patient not taking: No sig reported)     No current facility-administered medications for this visit. Physical Examination: General appearance - alert, well appearing, and in no distress  Mental status - alert, oriented to person, place, and time      Assessment/ Plan:   Diagnoses and all orders for this visit:    1. Encounter for monitoring Coumadin therapy  -     AMB POC PT/INR   Hold for 2 days then resume usual and recheck 4 weeks roughly. Follow-up and Dispositions    Return if symptoms worsen or fail to improve. I have discussed the diagnosis with the patient and the intended plan as seen in the above orders. The patient has received an after-visit summary and questions were answered concerning future plans.  Pt conveyed understanding of plan.     Medication Side Effects and Warnings were discussed with patient    An electronic signature was used to authenticate this note  Britt Brice, DO

## 2023-03-16 NOTE — PATIENT INSTRUCTIONS

## 2023-04-26 ENCOUNTER — OFFICE VISIT (OUTPATIENT)
Dept: FAMILY MEDICINE CLINIC | Age: 73
End: 2023-04-26
Payer: MEDICARE

## 2023-04-26 VITALS
RESPIRATION RATE: 16 BRPM | HEIGHT: 68 IN | BODY MASS INDEX: 22.73 KG/M2 | DIASTOLIC BLOOD PRESSURE: 86 MMHG | TEMPERATURE: 97.9 F | HEART RATE: 69 BPM | OXYGEN SATURATION: 96 % | SYSTOLIC BLOOD PRESSURE: 135 MMHG | WEIGHT: 150 LBS

## 2023-04-26 DIAGNOSIS — Z00.00 MEDICARE ANNUAL WELLNESS VISIT, SUBSEQUENT: ICD-10-CM

## 2023-04-26 DIAGNOSIS — E03.9 ACQUIRED HYPOTHYROIDISM: ICD-10-CM

## 2023-04-26 DIAGNOSIS — Z79.01 ENCOUNTER FOR MONITORING COUMADIN THERAPY: Primary | ICD-10-CM

## 2023-04-26 DIAGNOSIS — Z13.6 SCREENING FOR ISCHEMIC HEART DISEASE: ICD-10-CM

## 2023-04-26 DIAGNOSIS — Z51.81 ENCOUNTER FOR MONITORING COUMADIN THERAPY: Primary | ICD-10-CM

## 2023-04-26 DIAGNOSIS — I10 PRIMARY HYPERTENSION: ICD-10-CM

## 2023-04-26 DIAGNOSIS — I26.09 OTHER ACUTE PULMONARY EMBOLISM WITH ACUTE COR PULMONALE (HCC): ICD-10-CM

## 2023-04-26 LAB
INR BLD: 3.9
PT POC: 46.7 SECONDS
VALID INTERNAL CONTROL?: YES

## 2023-04-26 PROCEDURE — G0463 HOSPITAL OUTPT CLINIC VISIT: HCPCS | Performed by: FAMILY MEDICINE

## 2023-04-26 PROCEDURE — 85610 PROTHROMBIN TIME: CPT | Performed by: FAMILY MEDICINE

## 2023-04-26 RX ORDER — BUDESONIDE, GLYCOPYRROLATE, AND FORMOTEROL FUMARATE 160; 9; 4.8 UG/1; UG/1; UG/1
AEROSOL, METERED RESPIRATORY (INHALATION)
COMMUNITY

## 2023-04-26 RX ORDER — WARFARIN SODIUM 5 MG/1
TABLET ORAL
Qty: 90 TABLET | Refills: 1 | Status: SHIPPED | OUTPATIENT
Start: 2023-04-26

## 2023-04-26 NOTE — PATIENT INSTRUCTIONS
Medicare Wellness Visit, Male    The best way to live healthy is to have a lifestyle where you eat a well-balanced diet, exercise regularly, limit alcohol use, and quit all forms of tobacco/nicotine, if applicable. Regular preventive services are another way to keep healthy. Preventive services (vaccines, screening tests, monitoring & exams) can help personalize your care plan, which helps you manage your own care. Screening tests can find health problems at the earliest stages, when they are easiest to treat. [facility] follows the current, evidence-based guidelines published by the [facility] ([facility]) when recommending preventive services for our patients. Because we follow these guidelines, sometimes recommendations change over time as research supports it. (For example, a prostate screening blood test is no longer routinely recommended for men with no symptoms). Of course, you and your doctor may decide to screen more often for some diseases, based on your risk and co-morbidities (chronic disease you are already diagnosed with). Preventive services for you include:  - Medicare offers their members a free annual wellness visit, which is time for you and your primary care provider to discuss and plan for your preventive service needs.  Take advantage of this benefit every year!    -Over the age of 72 should receive the recommended pneumonia vaccines.   -All adults should have a flu vaccine yearly.  -All adults should receive a tetanus vaccine every 10 years.  -Over the age of 48 should receive the shingles vaccines.    -All adults should be screened once for Hepatitis C.  -All adults age 38-68 who are overweight should have a diabetes screening test once every three years.   -Other screening tests & preventive services for persons with diabetes include: an eye exam to screen for diabetic retinopathy, a kidney function test, a foot exam, and stricter control over your cholesterol.   -Cardiovascular screening for adults with routine risk involves an electrocardiogram (ECG) at intervals determined by the provider.     -Colorectal cancer screening should be done for adults age 43-69 with no increased risk factors for colorectal cancer. There are a number of acceptable methods of screening for this type of cancer. Each test has its own benefits and drawbacks. Discuss with your provider what is most appropriate for you during your annual wellness visit. The different tests include: colonoscopy (considered the best screening method), a fecal occult blood test, a fecal DNA test, and sigmoidoscopy.    -Lung cancer screening is recommended annually with a low dose CT scan for adults between age 54 and 68, who have smoked at least 30 pack years (equivalent of 1 pack per day for 30 days), and who is a current smoker or quit less than 15 years ago. -An Abdominal Aortic Aneurysm (AAA) Screening is recommended for men age 73-68 who has ever smoked in their lifetime.      Here is a list of your current Health Maintenance items (your personalized list of preventive services) with a due date:  Health Maintenance Due   Topic Date Due    Shingles Vaccine (1 of 2) Never done    Cholesterol Test   02/01/2023

## 2023-04-26 NOTE — PROGRESS NOTES
Chief Complaint   Patient presents with    Annual Wellness Visit    Labs            Patient presents in office today for AMW visit and fasting labs. Also here for INR check. No concerns. 1. Have you been to the ER, urgent care clinic since your last visit? Hospitalized since your last visit? No    2. Have you seen or consulted any other health care providers outside of the [address] [place] [address] since your last visit? Include any pap smears or colon screening.  No      Learning Assessment 2/1/2018   PRIMARY LEARNER Patient   HIGHEST LEVEL OF EDUCATION - PRIMARY LEARNER  GRADUATED HIGH SCHOOL OR GED   BARRIERS PRIMARY LEARNER NONE   CO-LEARNER CAREGIVER No   PRIMARY LANGUAGE ENGLISH   LEARNER PREFERENCE PRIMARY DEMONSTRATION   ANSWERED BY patient   RELATIONSHIP SELF

## 2023-04-26 NOTE — PROGRESS NOTES
Progress Note    he is a 67y.o. year old male who presents for evalution. Subjective:     Lab Results   Component Value Date/Time    Cholesterol, total 171 02/01/2018 08:17 AM    HDL Cholesterol 54 02/01/2018 08:17 AM    LDL, calculated 98 02/01/2018 08:17 AM    VLDL, calculated 19 02/01/2018 08:17 AM    Triglyceride 94 02/01/2018 08:17 AM   Ptehre for MWV and fasting labs. Currently on coumadin chronically and is above goal of 2-3 currently at 3.9. No bleeding issues We had him hold 2 days then resume usual last month and still high so will update dosing. Blood pressure controlled on medication. Currently taking levothyroxine doing fine with this due for recheck. Saw derm for skin cancer screening and was fine. Has sebaceous cyst that will be removed. Urology is checking prostate for his Ca. Reviewed PmHx, RxHx, FmHx, SocHx, AllgHx and updated and dated in the chart. Review of Systems - negative except as listed above in the HPI    Objective:     Vitals:    04/26/23 0757   BP: 135/86   Pulse: 69   Resp: 16   Temp: 97.9 °F (36.6 °C)   TempSrc: Oral   SpO2: 96%   Weight: 150 lb (68 kg)   Height: 5' 8\" (1.727 m)       Current Outpatient Medications   Medication Sig    budesonide-glycopyr-formoterol (Breztri Aerosphere) 160-9-4.8 mcg/actuation HFAA Take  by inhalation. warfarin (COUMADIN) 5 mg tablet 1 tab PO qday    albuterol (PROVENTIL HFA, VENTOLIN HFA, PROAIR HFA) 90 mcg/actuation inhaler Take 2 Puffs by inhalation every four (4) hours as needed for Wheezing. losartan (COZAAR) 100 mg tablet Take 1 Tablet by mouth daily. multivitamin (ONE A DAY) tablet Take 1 Tablet by mouth daily. pantoprazole (PROTONIX) 40 mg tablet Take 1 Tablet by mouth daily. levothyroxine (SYNTHROID) 175 mcg tablet Take 1 Tablet by mouth Daily (before breakfast). No current facility-administered medications for this visit.        Physical Examination: General appearance - alert, well appearing, and in no distress  Chest - clear to auscultation, no wheezes, rales or rhonchi, symmetric air entry  Heart - normal rate, regular rhythm, normal S1, S2, no murmurs, rubs, clicks or gallops      Assessment/ Plan:   Diagnoses and all orders for this visit:    1. Encounter for monitoring Coumadin therapy  -     AMB POC PT/INR    2. Medicare annual wellness visit, subsequent    3. Screening for ischemic heart disease  -     LIPID PANEL; Future    4. Primary hypertension  -     METABOLIC PANEL, COMPREHENSIVE; Future    5. Acquired hypothyroidism  -     T4, FREE; Future  -     TSH 3RD GENERATION; Future    6. Other acute pulmonary embolism with acute cor pulmonale (HCC)  -     warfarin (COUMADIN) 5 mg tablet; 1 tab PO qday  Hold 2 days then start 5mg daily     Follow-up and Dispositions    Return in about 4 weeks (around 5/24/2023), or if symptoms worsen or fail to improve. I have discussed the diagnosis with the patient and the intended plan as seen in the above orders. The patient has received an after-visit summary and questions were answered concerning future plans. Pt conveyed understanding of plan. Medication Side Effects and Warnings were discussed with patient    An electronic signature was used to authenticate this note  Fadi Knight, DO      This is the Subsequent Medicare Annual Wellness Exam, performed 12 months or more after the Initial AWV or the last Subsequent AWV    I have reviewed the patient's medical history in detail and updated the computerized patient record. Assessment/Plan   Education and counseling provided:  Are appropriate based on today's review and evaluation    1. Encounter for monitoring Coumadin therapy  -     AMB POC PT/INR  2. Medicare annual wellness visit, subsequent  3. Screening for ischemic heart disease  -     LIPID PANEL; Future  4. Primary hypertension  -     METABOLIC PANEL, COMPREHENSIVE; Future  5. Acquired hypothyroidism  -     T4, FREE;  Future  -     TSH 3RD GENERATION; Future  6. Other acute pulmonary embolism with acute cor pulmonale (HCC)  -     warfarin (COUMADIN) 5 mg tablet; 1 tab PO qday, Normal, Disp-90 Tablet, R-1Update dosing       Depression Risk Factor Screening     3 most recent PHQ Screens 4/26/2023   PHQ Not Done -   Little interest or pleasure in doing things Not at all   Feeling down, depressed, irritable, or hopeless Not at all   Total Score PHQ 2 0   Trouble falling or staying asleep, or sleeping too much -   Feeling tired or having little energy -   Poor appetite, weight loss, or overeating -   Feeling bad about yourself - or that you are a failure or have let yourself or your family down -   Trouble concentrating on things such as school, work, reading, or watching TV -   Moving or speaking so slowly that other people could have noticed; or the opposite being so fidgety that others notice -   Thoughts of being better off dead, or hurting yourself in some way -   PHQ 9 Score -   How difficult have these problems made it for you to do your work, take care of your home and get along with others -       Alcohol & Drug Abuse Risk Screen    Do you average more than 1 drink per night or more than 7 drinks a week: bottle of wine a night    In the past three months have you have had more than 4 drinks containing alcohol on one occasion: No          Functional Ability and Level of Safety    Hearing: Hearing is good. Activities of Daily Living: The home contains: grab bars  Patient does total self care      Ambulation: with no difficulty     Fall Risk:  Fall Risk Assessment, last 12 mths 2/15/2023   Able to walk? Yes   Fall in past 12 months? 0   Do you feel unsteady? 1   Are you worried about falling 0   Is TUG test greater than 12 seconds? -   Is the gait abnormal? 0   Number of falls in past 12 months -   Fall with injury?  -      Abuse Screen:  Patient is not abused       Cognitive Screening    Has your family/caregiver stated any concerns about your memory: no     Cognitive Screening: Normal - Mini Cog Test    Health Maintenance Due     Health Maintenance Due   Topic Date Due    Shingles Vaccine (1 of 2) Never done    Lipid Screen  02/01/2023       Patient Care Team   Patient Care Team:  Daly Tsai DO as PCP - General (Family Medicine)  Daly Tsai DO as PCP - [facility]    History     Patient Active Problem List   Diagnosis Code    Thyroid disease E07.9    Hypertension I10    Other acute pulmonary embolism with acute cor pulmonale (HCC) I26.09    Hypercoagulable state ([address] D68.59    Presence of IVC filter Z95.828    High risk medication use Z79.899    Malignant neoplasm of upper lobe of right lung (HCC) C34.11    Prostate cancer ([facility] C61    Centrilobular emphysema ([facility] J43.2     Past Medical History:   Diagnosis Date    GERD (gastroesophageal reflux disease)     Hypertension     Thyroid disease       Past Surgical History:   Procedure Laterality Date    HX LIPOMA RESECTION      left shoulder    HX ORTHOPAEDIC      left shoulder     Current Outpatient Medications   Medication Sig Dispense Refill    budesonide-glycopyr-formoterol (Breztri Aerosphere) 160-9-4.8 mcg/actuation HFAA Take  by inhalation. warfarin (COUMADIN) 5 mg tablet 1 tab PO qday 90 Tablet 1    albuterol (PROVENTIL HFA, VENTOLIN HFA, PROAIR HFA) 90 mcg/actuation inhaler Take 2 Puffs by inhalation every four (4) hours as needed for Wheezing. 18 g 5    losartan (COZAAR) 100 mg tablet Take 1 Tablet by mouth daily. 90 Tablet 4    multivitamin (ONE A DAY) tablet Take 1 Tablet by mouth daily. pantoprazole (PROTONIX) 40 mg tablet Take 1 Tablet by mouth daily. levothyroxine (SYNTHROID) 175 mcg tablet Take 1 Tablet by mouth Daily (before breakfast).        No Known Allergies    Family History   Problem Relation Age of Onset    Heart Disease Mother     Heart Attack Father      Social History     Tobacco Use    Smoking status: Former     Packs/day: 1.00     Years: 10.00     Pack years: 10.00     Types: Cigarettes     Quit date:      Years since quittin.3    Smokeless tobacco: Never   Substance Use Topics    Alcohol use:  Yes     Alcohol/week: 28.0 standard drinks     Types: 28 Glasses of wine per week     Comment: Bottle of wine per night          Shakir Eduardo DO

## 2023-04-27 LAB
ALBUMIN SERPL-MCNC: 4.2 G/DL (ref 3.5–5)
ALBUMIN/GLOB SERPL: 1.4 (ref 1.1–2.2)
ALP SERPL-CCNC: 57 U/L (ref 45–117)
ALT SERPL-CCNC: 19 U/L (ref 12–78)
ANION GAP SERPL CALC-SCNC: 6 MMOL/L (ref 5–15)
AST SERPL-CCNC: 16 U/L (ref 15–37)
BILIRUB SERPL-MCNC: 0.4 MG/DL (ref 0.2–1)
BUN SERPL-MCNC: 17 MG/DL (ref 6–20)
BUN/CREAT SERPL: 15 (ref 12–20)
CALCIUM SERPL-MCNC: 9.7 MG/DL (ref 8.5–10.1)
CHLORIDE SERPL-SCNC: 105 MMOL/L (ref 97–108)
CHOLEST SERPL-MCNC: 175 MG/DL
CO2 SERPL-SCNC: 29 MMOL/L (ref 21–32)
CREAT SERPL-MCNC: 1.1 MG/DL (ref 0.7–1.3)
GLOBULIN SER CALC-MCNC: 2.9 G/DL (ref 2–4)
GLUCOSE SERPL-MCNC: 85 MG/DL (ref 65–100)
HDLC SERPL-MCNC: 69 MG/DL
HDLC SERPL: 2.5 (ref 0–5)
LDLC SERPL CALC-MCNC: 87.6 MG/DL (ref 0–100)
POTASSIUM SERPL-SCNC: 4.6 MMOL/L (ref 3.5–5.1)
PROT SERPL-MCNC: 7.1 G/DL (ref 6.4–8.2)
SODIUM SERPL-SCNC: 140 MMOL/L (ref 136–145)
T4 FREE SERPL-MCNC: 1.1 NG/DL (ref 0.8–1.5)
TRIGL SERPL-MCNC: 92 MG/DL (ref ?–150)
TSH SERPL DL<=0.05 MIU/L-ACNC: 5.54 UIU/ML (ref 0.36–3.74)
VLDLC SERPL CALC-MCNC: 18.4 MG/DL

## 2023-04-27 NOTE — PROGRESS NOTES
TSH is slightly off but your free T4 is perfectly normal.  Continue current dose of levothyroxine and we can recheck this in 2 months. Cholesterol looks great.

## 2023-05-01 DIAGNOSIS — I10 ESSENTIAL HYPERTENSION: ICD-10-CM

## 2023-05-02 RX ORDER — LOSARTAN POTASSIUM 100 MG/1
TABLET ORAL
Qty: 90 TABLET | Refills: 0 | Status: SHIPPED | OUTPATIENT
Start: 2023-05-02

## 2023-05-04 ENCOUNTER — DOCUMENTATION ONLY (OUTPATIENT)
Dept: FAMILY MEDICINE CLINIC | Age: 73
End: 2023-05-04

## 2023-05-21 SDOH — ECONOMIC STABILITY: FOOD INSECURITY: WITHIN THE PAST 12 MONTHS, YOU WORRIED THAT YOUR FOOD WOULD RUN OUT BEFORE YOU GOT MONEY TO BUY MORE.: NEVER TRUE

## 2023-05-21 SDOH — ECONOMIC STABILITY: FOOD INSECURITY: WITHIN THE PAST 12 MONTHS, THE FOOD YOU BOUGHT JUST DIDN'T LAST AND YOU DIDN'T HAVE MONEY TO GET MORE.: NEVER TRUE

## 2023-05-21 SDOH — ECONOMIC STABILITY: TRANSPORTATION INSECURITY
IN THE PAST 12 MONTHS, HAS LACK OF TRANSPORTATION KEPT YOU FROM MEETINGS, WORK, OR FROM GETTING THINGS NEEDED FOR DAILY LIVING?: NO

## 2023-05-21 SDOH — ECONOMIC STABILITY: INCOME INSECURITY: HOW HARD IS IT FOR YOU TO PAY FOR THE VERY BASICS LIKE FOOD, HOUSING, MEDICAL CARE, AND HEATING?: NOT HARD AT ALL

## 2023-05-21 SDOH — ECONOMIC STABILITY: HOUSING INSECURITY
IN THE LAST 12 MONTHS, WAS THERE A TIME WHEN YOU DID NOT HAVE A STEADY PLACE TO SLEEP OR SLEPT IN A SHELTER (INCLUDING NOW)?: NO

## 2023-05-24 ENCOUNTER — OFFICE VISIT (OUTPATIENT)
Age: 73
End: 2023-05-24
Payer: MEDICARE

## 2023-05-24 VITALS
RESPIRATION RATE: 16 BRPM | HEART RATE: 67 BPM | HEIGHT: 68 IN | DIASTOLIC BLOOD PRESSURE: 83 MMHG | WEIGHT: 153 LBS | TEMPERATURE: 97.6 F | BODY MASS INDEX: 23.19 KG/M2 | OXYGEN SATURATION: 97 % | SYSTOLIC BLOOD PRESSURE: 139 MMHG

## 2023-05-24 DIAGNOSIS — L98.9 SKIN LESION: ICD-10-CM

## 2023-05-24 DIAGNOSIS — J43.2 CENTRILOBULAR EMPHYSEMA (HCC): ICD-10-CM

## 2023-05-24 DIAGNOSIS — Z51.81 ENCOUNTER FOR THERAPEUTIC DRUG LEVEL MONITORING: Primary | ICD-10-CM

## 2023-05-24 LAB
POC INR: 1.7
PROTHROMBIN TIME, POC: 17

## 2023-05-24 PROCEDURE — PBSHW AMB POC PT/INR: Performed by: FAMILY MEDICINE

## 2023-05-24 PROCEDURE — 3017F COLORECTAL CA SCREEN DOC REV: CPT | Performed by: FAMILY MEDICINE

## 2023-05-24 PROCEDURE — 3079F DIAST BP 80-89 MM HG: CPT | Performed by: FAMILY MEDICINE

## 2023-05-24 PROCEDURE — 85610 PROTHROMBIN TIME: CPT | Performed by: FAMILY MEDICINE

## 2023-05-24 PROCEDURE — 3075F SYST BP GE 130 - 139MM HG: CPT | Performed by: FAMILY MEDICINE

## 2023-05-24 PROCEDURE — 1123F ACP DISCUSS/DSCN MKR DOCD: CPT | Performed by: FAMILY MEDICINE

## 2023-05-24 PROCEDURE — 3023F SPIROM DOC REV: CPT | Performed by: FAMILY MEDICINE

## 2023-05-24 PROCEDURE — 99214 OFFICE O/P EST MOD 30 MIN: CPT | Performed by: FAMILY MEDICINE

## 2023-05-24 PROCEDURE — G8420 CALC BMI NORM PARAMETERS: HCPCS | Performed by: FAMILY MEDICINE

## 2023-05-24 PROCEDURE — 1036F TOBACCO NON-USER: CPT | Performed by: FAMILY MEDICINE

## 2023-05-24 PROCEDURE — G8427 DOCREV CUR MEDS BY ELIG CLIN: HCPCS | Performed by: FAMILY MEDICINE

## 2023-05-24 RX ORDER — BUDESONIDE AND FORMOTEROL FUMARATE DIHYDRATE 160; 4.5 UG/1; UG/1
2 AEROSOL RESPIRATORY (INHALATION) 2 TIMES DAILY
Qty: 10.2 G | Refills: 5 | Status: SHIPPED | OUTPATIENT
Start: 2023-05-24

## 2023-05-24 RX ORDER — BUDESONIDE, GLYCOPYRROLATE, AND FORMOTEROL FUMARATE 160; 9; 4.8 UG/1; UG/1; UG/1
AEROSOL, METERED RESPIRATORY (INHALATION)
COMMUNITY
End: 2023-05-24

## 2023-05-24 ASSESSMENT — PATIENT HEALTH QUESTIONNAIRE - PHQ9
2. FEELING DOWN, DEPRESSED OR HOPELESS: 0
SUM OF ALL RESPONSES TO PHQ QUESTIONS 1-9: 0
SUM OF ALL RESPONSES TO PHQ9 QUESTIONS 1 & 2: 0
SUM OF ALL RESPONSES TO PHQ QUESTIONS 1-9: 0
1. LITTLE INTEREST OR PLEASURE IN DOING THINGS: 0
SUM OF ALL RESPONSES TO PHQ QUESTIONS 1-9: 0
SUM OF ALL RESPONSES TO PHQ QUESTIONS 1-9: 0

## 2023-05-24 NOTE — PROGRESS NOTES
Chief Complaint   Patient presents with    Follow-up     INR     Patient presents in office today for INR check. Has a dry patch on his left elbow that he would like to have looked at. No other concerns. 1. Have you been to the ER, urgent care clinic since your last visit? Hospitalized since your last visit? No    2. Have you seen or consulted any other health care providers outside of the [address] [place] [address] since your last visit? Include any pap smears or colon screening.  No

## 2023-05-24 NOTE — PROGRESS NOTES
Progress Note    he is a 67y.o. year old male who presents for evaluation. Subjective:     Pt here for INR check and is a little low today was elevated last time. He is taking 5mg everyday. Prior was high and was taking 5 all days except 7.5 1 day per week. Adelia Ball was too expensive so he stopped this. Will try Symbicort he would like printed Rx. Wound L elbow not new, recurs tends to set elbow down on table etc.  No pus. Reviewed PmHx, RxHx, FmHx, SocHx, AllgHx and updated and dated in the chart. Review of Systems - negative except as listed above in the HPI    Objective:     Vitals:    05/24/23 1101   BP: 139/83   Site: Left Upper Arm   Position: Sitting   Pulse: 67   Resp: 16   Temp: 97.6 °F (36.4 °C)   TempSrc: Oral   SpO2: 97%   Weight: 153 lb (69.4 kg)   Height: 5' 8\" (1.727 m)       Current Outpatient Medications   Medication Sig    Multiple Vitamin (MULTI-VITAMIN DAILY PO) Take 1 tablet by mouth daily    budesonide-formoterol (SYMBICORT) 160-4.5 MCG/ACT AERO Inhale 2 puffs into the lungs 2 times daily    albuterol sulfate HFA (PROVENTIL;VENTOLIN;PROAIR) 108 (90 Base) MCG/ACT inhaler Inhale 2 puffs into the lungs every 4 hours as needed    levothyroxine (SYNTHROID) 175 MCG tablet Take 1 tablet by mouth every morning (before breakfast)    losartan (COZAAR) 100 MG tablet Take 1 tablet by mouth daily    pantoprazole (PROTONIX) 40 MG tablet Take 1 tablet by mouth daily    warfarin (COUMADIN) 5 MG tablet Take 7.5mg Saturday and 5mg all other days     No current facility-administered medications for this visit. Physical Examination: General appearance - alert, well appearing, and in no distress  Skin - scab of L elbow no concerning signs for carcinoma. Assessment/ Plan:   Tayla Mckeon was seen today for follow-up. Diagnoses and all orders for this visit:    Tayla Mckeon was seen today for follow-up.     Diagnoses and all orders for this visit:    Encounter for therapeutic drug level

## 2023-05-24 NOTE — PATIENT INSTRUCTIONS
Patient Education        A Healthy Lifestyle: Care Instructions  A healthy lifestyle can help you feel good, have more energy, and stay at a weight that's healthy for you. You can share a healthy lifestyle with your friends and family. And you can do it on your own. Eat meals with your friends or family. You could try cooking together. Plan activities with other people. Go for a walk with a friend, try a free online fitness class, or join a sports league. Eat a variety of healthy foods. These include fruits, vegetables, whole grains, low-fat dairy, and lean protein. Choose healthy portions of food. You can use the Nutrition Facts label on food packages as a guide. Eat more fruits and vegetables. You could add vegetables to sandwiches or add fruit to cereal.   Drink water when you are thirsty. Limit soda, juice, and sports drinks. Try to exercise most days. Aim for at least 2½ hours of exercise each week. Keep moving. Work in the garden or take your dog on a walk. Use the stairs instead of the elevator. If you use tobacco or nicotine, try to quit. Ask your doctor about programs and medicines to help you quit. Limit alcohol. Men should have no more than 2 drinks a day. Women should have no more than 1. For some people, no alcohol is the best choice. Follow-up care is a key part of your treatment and safety. Be sure to make and go to all appointments, and call your doctor if you are having problems. It's also a good idea to know your test results and keep a list of the medicines you take. Where can you learn more? Go to http://www.watts.com/ and enter U807 to learn more about \"A Healthy Lifestyle: Care Instructions. \"  Current as of: November 14, 2022               Content Version: 13.6  © 7741-6012 [employer], [employer]. Care instructions adapted under license by [facility] ([facility]).  If you have questions about a medical condition or this instruction, always ask your

## 2023-07-06 ENCOUNTER — OFFICE VISIT (OUTPATIENT)
Age: 73
End: 2023-07-06
Payer: MEDICARE

## 2023-07-06 VITALS
OXYGEN SATURATION: 98 % | HEART RATE: 69 BPM | TEMPERATURE: 98.2 F | HEIGHT: 68 IN | BODY MASS INDEX: 23.04 KG/M2 | SYSTOLIC BLOOD PRESSURE: 107 MMHG | DIASTOLIC BLOOD PRESSURE: 73 MMHG | RESPIRATION RATE: 18 BRPM | WEIGHT: 152 LBS

## 2023-07-06 DIAGNOSIS — J43.2 CENTRILOBULAR EMPHYSEMA (HCC): ICD-10-CM

## 2023-07-06 DIAGNOSIS — L28.1 PICKER'S NODULE: ICD-10-CM

## 2023-07-06 DIAGNOSIS — D68.9 COAGULATION DEFICIENCY (HCC): Primary | ICD-10-CM

## 2023-07-06 DIAGNOSIS — E07.9 THYROID DISEASE: ICD-10-CM

## 2023-07-06 LAB
POC INR: 2.6
PROTHROMBIN TIME, POC: 31.5
T4 FREE SERPL-MCNC: 1.3 NG/DL (ref 0.8–1.5)
TSH SERPL DL<=0.05 MIU/L-ACNC: 4.75 UIU/ML (ref 0.36–3.74)

## 2023-07-06 PROCEDURE — 3023F SPIROM DOC REV: CPT | Performed by: FAMILY MEDICINE

## 2023-07-06 PROCEDURE — 1036F TOBACCO NON-USER: CPT | Performed by: FAMILY MEDICINE

## 2023-07-06 PROCEDURE — 3017F COLORECTAL CA SCREEN DOC REV: CPT | Performed by: FAMILY MEDICINE

## 2023-07-06 PROCEDURE — 1123F ACP DISCUSS/DSCN MKR DOCD: CPT | Performed by: FAMILY MEDICINE

## 2023-07-06 PROCEDURE — 3074F SYST BP LT 130 MM HG: CPT | Performed by: FAMILY MEDICINE

## 2023-07-06 PROCEDURE — 3078F DIAST BP <80 MM HG: CPT | Performed by: FAMILY MEDICINE

## 2023-07-06 PROCEDURE — 99214 OFFICE O/P EST MOD 30 MIN: CPT | Performed by: FAMILY MEDICINE

## 2023-07-06 PROCEDURE — G8427 DOCREV CUR MEDS BY ELIG CLIN: HCPCS | Performed by: FAMILY MEDICINE

## 2023-07-06 PROCEDURE — G8420 CALC BMI NORM PARAMETERS: HCPCS | Performed by: FAMILY MEDICINE

## 2023-07-06 PROCEDURE — PBSHW AMB POC PT/INR: Performed by: FAMILY MEDICINE

## 2023-07-06 PROCEDURE — 85610 PROTHROMBIN TIME: CPT | Performed by: FAMILY MEDICINE

## 2023-07-06 RX ORDER — FLUTICASONE PROPIONATE AND SALMETEROL 250; 50 UG/1; UG/1
1 POWDER RESPIRATORY (INHALATION) EVERY 12 HOURS
Qty: 60 EACH | Refills: 5 | Status: SHIPPED | OUTPATIENT
Start: 2023-07-06

## 2023-07-06 RX ORDER — ALBUTEROL SULFATE 90 UG/1
2 AEROSOL, METERED RESPIRATORY (INHALATION) 2 TIMES DAILY
Qty: 18 G | Refills: 5 | Status: SHIPPED | OUTPATIENT
Start: 2023-07-06

## 2023-07-06 ASSESSMENT — PATIENT HEALTH QUESTIONNAIRE - PHQ9
SUM OF ALL RESPONSES TO PHQ9 QUESTIONS 1 & 2: 0
2. FEELING DOWN, DEPRESSED OR HOPELESS: 0
1. LITTLE INTEREST OR PLEASURE IN DOING THINGS: 0
SUM OF ALL RESPONSES TO PHQ QUESTIONS 1-9: 0

## 2023-07-06 NOTE — PROGRESS NOTES
Patient here for inr check. Hx PE, warfarin 5 mg daily except 7.5 mg on Saturdays. 1. Have you been to the ER, urgent care clinic since your last visit? Hospitalized since your last visit? No    2. Have you seen or consulted any other health care providers outside of the [address] [place] [address] since your last visit? Include any pap smears or colon screening.  No    Results for orders placed or performed in visit on 07/06/23   AMB POC PT/INR   Result Value Ref Range    Prothrombin time, POC 31.5     POC INR 2.6
rate, regular rhythm, normal S1, S2, no murmurs, rubs, clicks or gallops      Assessment/ Plan:   Italia Asif was seen today for coagulation disorder. Diagnoses and all orders for this visit:    Coagulation deficiency ([address])  -     AMB POC PT/INR  @ goal no changes recheck 1 month  Centrilobular emphysema (HCC)  -     albuterol sulfate HFA (PROVENTIL;VENTOLIN;PROAIR) 108 (90 Base) MCG/ACT inhaler; Inhale 2 puffs into the lungs 2 times daily  -     fluticasone-salmeterol (ADVAIR DISKUS) 250-50 MCG/ACT AEPB diskus inhaler; Inhale 1 puff into the lungs in the morning and 1 puff in the evening. 's nodule  No longer picking    Thyroid disease  TSH and free t4     Return in about 4 weeks (around 8/3/2023), or if symptoms worsen or fail to improve. I have discussed the diagnosis with the patient and the intended plan as seen in the above orders. The patient has received an after-visit summary and questions were answered concerning future plans. Pt conveyed understanding of plan.     Medication Side Effects and Warnings were discussed with patient    An electronic signature was used to authenticate this note  Kiki Hillman DO

## 2023-07-18 ENCOUNTER — TELEPHONE (OUTPATIENT)
Age: 73
End: 2023-07-18

## 2023-07-18 NOTE — TELEPHONE ENCOUNTER
Pt came into the office and dropped off copies of his immunization records for his chart    Paperwork is in the mailbox in the front office

## 2023-08-02 ENCOUNTER — OFFICE VISIT (OUTPATIENT)
Age: 73
End: 2023-08-02
Payer: MEDICARE

## 2023-08-02 VITALS
TEMPERATURE: 97.9 F | HEIGHT: 68 IN | BODY MASS INDEX: 23.04 KG/M2 | SYSTOLIC BLOOD PRESSURE: 138 MMHG | RESPIRATION RATE: 16 BRPM | HEART RATE: 64 BPM | OXYGEN SATURATION: 99 % | DIASTOLIC BLOOD PRESSURE: 84 MMHG | WEIGHT: 152 LBS

## 2023-08-02 DIAGNOSIS — I26.09 OTHER ACUTE PULMONARY EMBOLISM WITH ACUTE COR PULMONALE (HCC): Primary | ICD-10-CM

## 2023-08-02 DIAGNOSIS — I10 PRIMARY HYPERTENSION: ICD-10-CM

## 2023-08-02 LAB
POC INR: 2.1
PROTHROMBIN TIME, POC: 25.6

## 2023-08-02 PROCEDURE — 1036F TOBACCO NON-USER: CPT | Performed by: FAMILY MEDICINE

## 2023-08-02 PROCEDURE — 85610 PROTHROMBIN TIME: CPT | Performed by: FAMILY MEDICINE

## 2023-08-02 PROCEDURE — 1123F ACP DISCUSS/DSCN MKR DOCD: CPT | Performed by: FAMILY MEDICINE

## 2023-08-02 PROCEDURE — 3017F COLORECTAL CA SCREEN DOC REV: CPT | Performed by: FAMILY MEDICINE

## 2023-08-02 PROCEDURE — 99214 OFFICE O/P EST MOD 30 MIN: CPT | Performed by: FAMILY MEDICINE

## 2023-08-02 PROCEDURE — G8420 CALC BMI NORM PARAMETERS: HCPCS | Performed by: FAMILY MEDICINE

## 2023-08-02 PROCEDURE — PBSHW AMB POC PT/INR: Performed by: FAMILY MEDICINE

## 2023-08-02 PROCEDURE — G8427 DOCREV CUR MEDS BY ELIG CLIN: HCPCS | Performed by: FAMILY MEDICINE

## 2023-08-02 PROCEDURE — 3075F SYST BP GE 130 - 139MM HG: CPT | Performed by: FAMILY MEDICINE

## 2023-08-02 PROCEDURE — 3079F DIAST BP 80-89 MM HG: CPT | Performed by: FAMILY MEDICINE

## 2023-08-02 RX ORDER — LOSARTAN POTASSIUM 100 MG/1
100 TABLET ORAL DAILY
Qty: 90 TABLET | Refills: 3 | Status: SHIPPED | OUTPATIENT
Start: 2023-08-02

## 2023-08-02 NOTE — PROGRESS NOTES
Chief Complaint   Patient presents with    Anticoagulation       1. Patient in office today for pt/inr check. Pt is taking coumadin 5mg daily except Saturdays 7.5mg. Have no concerns. 1. Have you been to the ER, urgent care clinic since your last visit? Hospitalized since your last visit? No    2. Have you seen or consulted any other health care providers outside of the [address] [place] [address] Avenue since your last visit? Include any pap smears or colon screening.  No

## 2023-08-23 ENCOUNTER — TELEPHONE (OUTPATIENT)
Age: 73
End: 2023-08-23

## 2023-08-23 NOTE — TELEPHONE ENCOUNTER
There is no generic. He needs to speak with the pharmacy to see which brand is cheapest for his insurance and ask them to give him that.

## 2023-08-23 NOTE — TELEPHONE ENCOUNTER
Pt wants a RX for the generic for Albuterol the other inhaler cost to much pt wants the nurse to call him when the refill is done

## 2023-08-24 ENCOUNTER — TELEPHONE (OUTPATIENT)
Age: 73
End: 2023-08-24

## 2023-08-24 DIAGNOSIS — J43.2 CENTRILOBULAR EMPHYSEMA (HCC): ICD-10-CM

## 2023-08-24 RX ORDER — ALBUTEROL SULFATE 90 UG/1
2 AEROSOL, METERED RESPIRATORY (INHALATION) EVERY 4 HOURS PRN
Qty: 18 G | Refills: 5 | Status: SHIPPED | OUTPATIENT
Start: 2023-08-24

## 2023-08-24 NOTE — TELEPHONE ENCOUNTER
Pt would like to have a RX sent in for Albuterol, he is going to use [facility] instead of his insurance because [facility] is a better price.     Please call Pt and let him know if this can be done, his CB# is [phone]

## 2023-08-30 ENCOUNTER — HOSPITAL ENCOUNTER (OUTPATIENT)
Facility: HOSPITAL | Age: 73
Discharge: HOME | End: 2023-09-02

## 2023-08-30 VITALS
WEIGHT: 151 LBS | HEART RATE: 61 BPM | OXYGEN SATURATION: 99 % | BODY MASS INDEX: 22.88 KG/M2 | SYSTOLIC BLOOD PRESSURE: 139 MMHG | HEIGHT: 68 IN | DIASTOLIC BLOOD PRESSURE: 86 MMHG | RESPIRATION RATE: 16 BRPM

## 2023-08-30 DIAGNOSIS — C61 PROSTATE CANCER (HCC): Primary | ICD-10-CM

## 2023-08-30 NOTE — PROGRESS NOTES
RADIATION ONCOLOGY FOLLOW-UP VISIT NOTE     Encounter Date: 08/30/23   Patient Name: Emerick Bamberger  Medical Record Number: [id number]  Referring Physician: Antoine Almonte MD  Primary Care Provider: Abdullahi Tinoco DO    DIAGNOSIS:      ICD-10-CM    1. Prostate cancer ([address])  C61         STAGING: Cancer Staging   Prostate cancer [facility])  Staging form: Prostate, AJCC 8th Edition  - Clinical stage from 5/5/2022: Stage IIC (cT1c, cN0, cM0, PSA: 5.9, Grade Group: 3) - Signed by Padmini Garcia MD on 8/30/2023  AJCC Staging has been reviewed      DURATION SINCE COMPLETION OF RADIOTHERAPY:  9 months (completed 11/4/22)      ASSESSMENT:  68 yo M with unfavorable intermediate risk prostate cancer, cT1c, iPSA 5.94, [place] 4+3=7 in 6 cores (7/13 total cores involved) treated with definitive IMRT (6000cGy, completed 11/4/22) + 4 months ADT (final depot 12/15/22). PROSTATE CANCER:  [place] 4+3=7 disease, iPSA 5.94 diagnosed with TRUS on 3/31/22. Had pelvic MRI on 2/13/22 showing a PIRADS-5 lesion in the left mid gland with possible microscopic EPE. Attempted RALP on 8/16/22 was aborted due to patient PEA arrest during induction of anesthesia. Thus treated with definitive hypofractionated IMRT (6000cGy in 20fx) + 4 months ADT. IMRT completed 11/4/22 and final ADT depot was given 12/15/22 (7/5mg lupron). PSA from 12/2022 was 1.01 and then 0.634 before increasing to 1.25 and likely a benign bounce as currently trending back down and currently at 1.01 and thus DOLORES. No additional ADT/treatment needed. Continue PSA surveillance q3-6 months.  SYMPTOMS: stable to pre-treatment baseline. No intervention needed. GI SYMPTOMS: none  SEXUAL HEALTH: low desire, secondary to low T from ADT. Despite ADT completing in 12/2022, still has low libido, although not a bother to his QoL. LEFT GROIN DISCOMFORT: discussed at prior visit, but now resolved spontaneously.         RECOMMENDATIONS:  Follow up with urology in 3 months w/PSA and

## 2023-09-07 ENCOUNTER — OFFICE VISIT (OUTPATIENT)
Age: 73
End: 2023-09-07
Payer: MEDICARE

## 2023-09-07 VITALS
DIASTOLIC BLOOD PRESSURE: 80 MMHG | WEIGHT: 153 LBS | HEART RATE: 70 BPM | BODY MASS INDEX: 23.19 KG/M2 | TEMPERATURE: 97.7 F | OXYGEN SATURATION: 96 % | SYSTOLIC BLOOD PRESSURE: 127 MMHG | HEIGHT: 68 IN | RESPIRATION RATE: 18 BRPM

## 2023-09-07 DIAGNOSIS — Z51.81 ENCOUNTER FOR THERAPEUTIC DRUG LEVEL MONITORING: Primary | ICD-10-CM

## 2023-09-07 DIAGNOSIS — J43.2 CENTRILOBULAR EMPHYSEMA (HCC): ICD-10-CM

## 2023-09-07 LAB
POC INR: 3.3
PROTHROMBIN TIME, POC: 39.4

## 2023-09-07 PROCEDURE — G8427 DOCREV CUR MEDS BY ELIG CLIN: HCPCS | Performed by: FAMILY MEDICINE

## 2023-09-07 PROCEDURE — PBSHW AMB POC PT/INR: Performed by: FAMILY MEDICINE

## 2023-09-07 PROCEDURE — 1123F ACP DISCUSS/DSCN MKR DOCD: CPT | Performed by: FAMILY MEDICINE

## 2023-09-07 PROCEDURE — 3017F COLORECTAL CA SCREEN DOC REV: CPT | Performed by: FAMILY MEDICINE

## 2023-09-07 PROCEDURE — 3074F SYST BP LT 130 MM HG: CPT | Performed by: FAMILY MEDICINE

## 2023-09-07 PROCEDURE — 3079F DIAST BP 80-89 MM HG: CPT | Performed by: FAMILY MEDICINE

## 2023-09-07 PROCEDURE — 99214 OFFICE O/P EST MOD 30 MIN: CPT | Performed by: FAMILY MEDICINE

## 2023-09-07 PROCEDURE — G8420 CALC BMI NORM PARAMETERS: HCPCS | Performed by: FAMILY MEDICINE

## 2023-09-07 PROCEDURE — 85610 PROTHROMBIN TIME: CPT | Performed by: FAMILY MEDICINE

## 2023-09-07 PROCEDURE — 1036F TOBACCO NON-USER: CPT | Performed by: FAMILY MEDICINE

## 2023-09-07 PROCEDURE — 3023F SPIROM DOC REV: CPT | Performed by: FAMILY MEDICINE

## 2023-09-07 NOTE — PROGRESS NOTES
Chief Complaint   Patient presents with    Follow-up     INR      Patient presents in office today for INR check. No concerns. 1. \"Have you been to the ER, urgent care clinic since your last visit? Hospitalized since your last visit? \" No    2. \"Have you seen or consulted any other health care providers outside of the [address] [place] [address] since your last visit? \" No     3. For patients aged 43-73: Has the patient had a colonoscopy / FIT/ Cologuard? No      If the patient is female:    4. For patients aged 43-66: Has the patient had a mammogram within the past 2 years? NA - based on age or sex      11. For patients aged 21-65: Has the patient had a pap smear?  NA - based on age or sex

## 2023-09-13 ENCOUNTER — HOSPITAL ENCOUNTER (OUTPATIENT)
Facility: HOSPITAL | Age: 73
Discharge: HOME | End: 2023-09-16
Payer: MEDICARE

## 2023-09-13 DIAGNOSIS — C34.11 CANCER OF UPPER LOBE OF RIGHT LUNG (HCC): ICD-10-CM

## 2023-09-13 PROCEDURE — 71250 CT THORAX DX C-: CPT

## 2023-10-12 ENCOUNTER — OFFICE VISIT (OUTPATIENT)
Age: 73
End: 2023-10-12
Payer: MEDICARE

## 2023-10-12 VITALS
WEIGHT: 152 LBS | HEIGHT: 68 IN | SYSTOLIC BLOOD PRESSURE: 145 MMHG | OXYGEN SATURATION: 96 % | BODY MASS INDEX: 23.04 KG/M2 | HEART RATE: 72 BPM | DIASTOLIC BLOOD PRESSURE: 89 MMHG | TEMPERATURE: 97.9 F | RESPIRATION RATE: 16 BRPM

## 2023-10-12 DIAGNOSIS — Z51.81 ENCOUNTER FOR THERAPEUTIC DRUG LEVEL MONITORING: Primary | ICD-10-CM

## 2023-10-12 DIAGNOSIS — E03.9 ACQUIRED HYPOTHYROIDISM: ICD-10-CM

## 2023-10-12 DIAGNOSIS — J43.2 CENTRILOBULAR EMPHYSEMA (HCC): ICD-10-CM

## 2023-10-12 LAB
POC INR: 2.4
PROTHROMBIN TIME, POC: 28.2

## 2023-10-12 PROCEDURE — 3079F DIAST BP 80-89 MM HG: CPT | Performed by: FAMILY MEDICINE

## 2023-10-12 PROCEDURE — 1123F ACP DISCUSS/DSCN MKR DOCD: CPT | Performed by: FAMILY MEDICINE

## 2023-10-12 PROCEDURE — 3077F SYST BP >= 140 MM HG: CPT | Performed by: FAMILY MEDICINE

## 2023-10-12 PROCEDURE — 1036F TOBACCO NON-USER: CPT | Performed by: FAMILY MEDICINE

## 2023-10-12 PROCEDURE — 3023F SPIROM DOC REV: CPT | Performed by: FAMILY MEDICINE

## 2023-10-12 PROCEDURE — G8484 FLU IMMUNIZE NO ADMIN: HCPCS | Performed by: FAMILY MEDICINE

## 2023-10-12 PROCEDURE — 99214 OFFICE O/P EST MOD 30 MIN: CPT | Performed by: FAMILY MEDICINE

## 2023-10-12 PROCEDURE — PBSHW AMB POC PT/INR: Performed by: FAMILY MEDICINE

## 2023-10-12 PROCEDURE — 3017F COLORECTAL CA SCREEN DOC REV: CPT | Performed by: FAMILY MEDICINE

## 2023-10-12 PROCEDURE — G8420 CALC BMI NORM PARAMETERS: HCPCS | Performed by: FAMILY MEDICINE

## 2023-10-12 PROCEDURE — 85610 PROTHROMBIN TIME: CPT | Performed by: FAMILY MEDICINE

## 2023-10-12 PROCEDURE — G8427 DOCREV CUR MEDS BY ELIG CLIN: HCPCS | Performed by: FAMILY MEDICINE

## 2023-10-12 NOTE — PROGRESS NOTES
Chief Complaint   Patient presents with    Follow-up     INR     Patient presents in office today for INR check. No concerns. 1. \"Have you been to the ER, urgent care clinic since your last visit? Hospitalized since your last visit? \" No    2. \"Have you seen or consulted any other health care providers outside of the [address] [place] [address] since your last visit? \" No     3. For patients aged 43-73: Has the patient had a colonoscopy / FIT/ Cologuard? No      If the patient is female:    4. For patients aged 43-66: Has the patient had a mammogram within the past 2 years? NA - based on age or sex      11. For patients aged 21-65: Has the patient had a pap smear?  NA - based on age or sex
([address] [facility])  Breztri samples given     Return in about 4 weeks (around 11/9/2023), or if symptoms worsen or fail to improve. I have discussed the diagnosis with the patient and the intended plan as seen in the above orders. The patient has received an after-visit summary and questions were answered concerning future plans. Pt conveyed understanding of plan.     Medication Side Effects and Warnings were discussed with patient    An electronic signature was used to authenticate this note  Darcie Coles DO

## 2023-10-13 DIAGNOSIS — E03.9 ACQUIRED HYPOTHYROIDISM: ICD-10-CM

## 2023-10-13 DIAGNOSIS — E07.9 THYROID DISEASE: Primary | ICD-10-CM

## 2023-10-13 LAB
T4 FREE SERPL-MCNC: 1.2 NG/DL (ref 0.8–1.5)
TSH SERPL DL<=0.05 MIU/L-ACNC: 7.8 UIU/ML (ref 0.36–3.74)

## 2023-10-13 RX ORDER — LEVOTHYROXINE SODIUM 0.2 MG/1
200 TABLET ORAL
Qty: 90 TABLET | Refills: 3 | Status: SHIPPED | OUTPATIENT
Start: 2023-10-13

## 2023-11-07 RX ORDER — WARFARIN SODIUM 5 MG/1
TABLET ORAL
Qty: 100 TABLET | Refills: 3 | Status: SHIPPED | OUTPATIENT
Start: 2023-11-07

## 2023-11-15 ENCOUNTER — OFFICE VISIT (OUTPATIENT)
Age: 73
End: 2023-11-15
Payer: MEDICARE

## 2023-11-15 VITALS
DIASTOLIC BLOOD PRESSURE: 84 MMHG | TEMPERATURE: 97.5 F | SYSTOLIC BLOOD PRESSURE: 133 MMHG | OXYGEN SATURATION: 96 % | BODY MASS INDEX: 23.04 KG/M2 | HEIGHT: 68 IN | WEIGHT: 152 LBS | RESPIRATION RATE: 16 BRPM | HEART RATE: 74 BPM

## 2023-11-15 DIAGNOSIS — R51.9 PRESSURE IN HEAD: ICD-10-CM

## 2023-11-15 DIAGNOSIS — R41.89 COGNITIVE DECLINE: ICD-10-CM

## 2023-11-15 DIAGNOSIS — Z51.81 ENCOUNTER FOR THERAPEUTIC DRUG LEVEL MONITORING: Primary | ICD-10-CM

## 2023-11-15 DIAGNOSIS — Z85.118 HISTORY OF LUNG CANCER: ICD-10-CM

## 2023-11-15 LAB
POC INR: 3.9
PROTHROMBIN TIME, POC: 36.4

## 2023-11-15 PROCEDURE — 3079F DIAST BP 80-89 MM HG: CPT | Performed by: FAMILY MEDICINE

## 2023-11-15 PROCEDURE — 3075F SYST BP GE 130 - 139MM HG: CPT | Performed by: FAMILY MEDICINE

## 2023-11-15 PROCEDURE — 85610 PROTHROMBIN TIME: CPT | Performed by: FAMILY MEDICINE

## 2023-11-15 PROCEDURE — 1036F TOBACCO NON-USER: CPT | Performed by: FAMILY MEDICINE

## 2023-11-15 PROCEDURE — 3017F COLORECTAL CA SCREEN DOC REV: CPT | Performed by: FAMILY MEDICINE

## 2023-11-15 PROCEDURE — G8484 FLU IMMUNIZE NO ADMIN: HCPCS | Performed by: FAMILY MEDICINE

## 2023-11-15 PROCEDURE — G8427 DOCREV CUR MEDS BY ELIG CLIN: HCPCS | Performed by: FAMILY MEDICINE

## 2023-11-15 PROCEDURE — 99214 OFFICE O/P EST MOD 30 MIN: CPT | Performed by: FAMILY MEDICINE

## 2023-11-15 PROCEDURE — G8420 CALC BMI NORM PARAMETERS: HCPCS | Performed by: FAMILY MEDICINE

## 2023-11-15 PROCEDURE — 1123F ACP DISCUSS/DSCN MKR DOCD: CPT | Performed by: FAMILY MEDICINE

## 2023-11-15 PROCEDURE — PBSHW AMB POC PT/INR: Performed by: FAMILY MEDICINE

## 2023-11-15 NOTE — PROGRESS NOTES
Chief Complaint   Patient presents with    Follow-up     INR      Patient presents in office today for INR check. Has c/o lightheadedness about every other day. States that it is making him feel disoriented. No other concerns. 1. \"Have you been to the ER, urgent care clinic since your last visit? Hospitalized since your last visit? \" No    2. \"Have you seen or consulted any other health care providers outside of the [address] [place] [address] since your last visit? \" No     3. For patients aged 43-73: Has the patient had a colonoscopy / FIT/ Cologuard? No      If the patient is female:    4. For patients aged 43-66: Has the patient had a mammogram within the past 2 years? NA - based on age or sex      11. For patients aged 21-65: Has the patient had a pap smear?  NA - based on age or sex

## 2023-11-30 ENCOUNTER — HOSPITAL ENCOUNTER (OUTPATIENT)
Facility: HOSPITAL | Age: 73
Discharge: HOME | End: 2023-11-30
Attending: FAMILY MEDICINE
Payer: MEDICARE

## 2023-11-30 VITALS — BODY MASS INDEX: 23.11 KG/M2 | WEIGHT: 152 LBS

## 2023-11-30 DIAGNOSIS — R41.89 COGNITIVE DECLINE: ICD-10-CM

## 2023-11-30 DIAGNOSIS — Z85.118 HISTORY OF LUNG CANCER: ICD-10-CM

## 2023-11-30 DIAGNOSIS — R51.9 PRESSURE IN HEAD: ICD-10-CM

## 2023-11-30 PROCEDURE — 6360000004 HC RX CONTRAST MEDICATION: Performed by: RADIOLOGY

## 2023-11-30 PROCEDURE — 70553 MRI BRAIN STEM W/O & W/DYE: CPT

## 2023-11-30 PROCEDURE — A9579 GAD-BASE MR CONTRAST NOS,1ML: HCPCS | Performed by: RADIOLOGY

## 2023-11-30 RX ADMIN — GADOTERIDOL 13 ML: 279.3 INJECTION, SOLUTION INTRAVENOUS at 13:37

## 2023-12-27 NOTE — PROGRESS NOTES
Progress Note    he is a 66y.o. year old male who presents for evalution. Subjective:     Patient here for follow-up on his INR should be 2-3 for pulmonary embolism. Currently slightly elevated 3.4. He is alternating 5 and 7.5 mg 1 day five 7.5 the next so on and so forth. He is not having any issues with bleeding currently. He is reporting some right sided abdominal discomfort. It is a tingling sensation. This was prev discussed and is just annoying not painful. We discussed different options such as EMLA cream or lidocaine patches not interested in these at this time. Reviewed PmHx, RxHx, FmHx, SocHx, AllgHx and updated and dated in the chart. Review of Systems - negative except as listed above in the HPI    Objective:     Vitals:    11/18/21 1316   BP: (!) 135/90   Pulse: 72   Resp: 16   Temp: 98 °F (36.7 °C)   TempSrc: Oral   SpO2: 96%   Weight: 141 lb (64 kg)   Height: 5' 8\" (1.727 m)       Current Outpatient Medications   Medication Sig    warfarin (COUMADIN) 5 mg tablet Take 5mg Sunday, Tuesday Friday and Saturday. 7.5 all other days.  acetaminophen (Tylenol Extra Strength) 500 mg tablet Take  by mouth every six (6) hours as needed for Pain.  albuterol (PROVENTIL HFA, VENTOLIN HFA, PROAIR HFA) 90 mcg/actuation inhaler Take 2 Puffs by inhalation every four (4) hours as needed for Wheezing.  losartan (COZAAR) 100 mg tablet Take 1 Tablet by mouth daily.  multivitamin (ONE A DAY) tablet Take 1 Tab by mouth daily.  pantoprazole (PROTONIX) 40 mg tablet Take 40 mg by mouth daily.  levothyroxine (LEVOXYL) 175 mcg tablet Take 175 mcg by mouth Daily (before breakfast).  azelastine (ASTEPRO) 0.15 % (205.5 mcg) 2 Sprays by Both Nostrils route two (2) times a day. (Patient not taking: Reported on 8/4/2021)    montelukast (SINGULAIR) 10 mg tablet Take 1 Tab by mouth daily.  (Patient not taking: Reported on 8/4/2021)    famotidine (PEPCID) 10 mg tablet Take 10 mg by mouth daily. (Patient not taking: Reported on 8/4/2021)    ondansetron (ZOFRAN ODT) 8 mg disintegrating tablet Take 1 Tab by mouth every eight (8) hours as needed for Nausea. (Patient not taking: Reported on 8/4/2021)    ferrous sulfate 325 mg (65 mg iron) tablet 1 tab PO bid with food (Patient not taking: Reported on 8/4/2021)     No current facility-administered medications for this visit. Physical Examination: General appearance - alert, well appearing, and in no distress  Abdomen -surgical wounds right upper quadrant are healing well. No skin lesions    Assessment/ Plan:   Diagnoses and all orders for this visit:    1. Encounter for monitoring Coumadin therapy  -     AMB POC PT/INR    2. Other acute pulmonary embolism with acute cor pulmonale (HCC)  -     warfarin (COUMADIN) 5 mg tablet; Take 5mg Sunday, Tuesday Friday and Saturday. 7.5 all other days. Hold today's dose and start new dosing. 3. Paresthesia  Discussed with pt and will take time     Follow-up and Dispositions    · Return in about 2 weeks (around 12/2/2021), or if symptoms worsen or fail to improve. I have discussed the diagnosis with the patient and the intended plan as seen in the above orders. The patient has received an after-visit summary and questions were answered concerning future plans. Pt conveyed understanding of plan.     Medication Side Effects and Warnings were discussed with patient    An electronic signature was used to authenticate this note  Wanda Maurice DO oriented to person, place, time and situation

## 2024-01-10 ENCOUNTER — TELEPHONE (OUTPATIENT)
Age: 74
End: 2024-01-10

## 2024-01-10 NOTE — TELEPHONE ENCOUNTER
Pt came into the office and dropped off proof of his COVID and RSV injections, the would like to have them added to his chart.    Paperwork is in the mailbox in the front office

## 2024-01-26 ENCOUNTER — OFFICE VISIT (OUTPATIENT)
Age: 74
End: 2024-01-26
Payer: MEDICARE

## 2024-01-26 VITALS
HEIGHT: 68 IN | HEART RATE: 75 BPM | DIASTOLIC BLOOD PRESSURE: 87 MMHG | RESPIRATION RATE: 16 BRPM | SYSTOLIC BLOOD PRESSURE: 131 MMHG | WEIGHT: 150 LBS | BODY MASS INDEX: 22.73 KG/M2 | TEMPERATURE: 98.3 F | OXYGEN SATURATION: 94 %

## 2024-01-26 DIAGNOSIS — Z79.01 LONG TERM (CURRENT) USE OF ANTICOAGULANTS: Primary | ICD-10-CM

## 2024-01-26 LAB
POC INR: 4.2
PROTHROMBIN TIME, POC: 50.9

## 2024-01-26 PROCEDURE — 99214 OFFICE O/P EST MOD 30 MIN: CPT | Performed by: FAMILY MEDICINE

## 2024-01-26 PROCEDURE — 85610 PROTHROMBIN TIME: CPT | Performed by: FAMILY MEDICINE

## 2024-01-26 RX ORDER — GENTAMICIN SULFATE 1 MG/G
OINTMENT TOPICAL 3 TIMES DAILY
COMMUNITY

## 2024-01-26 RX ORDER — SULFAMETHOXAZOLE AND TRIMETHOPRIM 800; 160 MG/1; MG/1
1 TABLET ORAL 2 TIMES DAILY
COMMUNITY

## 2024-01-26 ASSESSMENT — PATIENT HEALTH QUESTIONNAIRE - PHQ9
SUM OF ALL RESPONSES TO PHQ QUESTIONS 1-9: 0
SUM OF ALL RESPONSES TO PHQ QUESTIONS 1-9: 0
1. LITTLE INTEREST OR PLEASURE IN DOING THINGS: 0
SUM OF ALL RESPONSES TO PHQ QUESTIONS 1-9: 0
2. FEELING DOWN, DEPRESSED OR HOPELESS: 0
SUM OF ALL RESPONSES TO PHQ9 QUESTIONS 1 & 2: 0
SUM OF ALL RESPONSES TO PHQ QUESTIONS 1-9: 0

## 2024-01-26 NOTE — PROGRESS NOTES
Progress Note    he is a 73 y.o. year old male who presents for evaluation.    Subjective:     Pt here for INR check and he is elevated and was just started on 2 weeks of bactrim.  Dosing of coumadin was not adjusted but we will adjust today.  This is for skin infection of L cheek region.      Reviewed PmHx, RxHx, FmHx, SocHx, AllgHx and updated and dated in the chart.    Review of Systems - negative except as listed above in the HPI    Objective:     Vitals:    01/26/24 1107   BP: 131/87   Site: Left Upper Arm   Position: Sitting   Pulse: 75   Resp: 16   Temp: 98.3 °F (36.8 °C)   TempSrc: Oral   SpO2: 94%   Weight: 68 kg (150 lb)   Height: 1.727 m (5' 8\")       Current Outpatient Medications   Medication Sig    gentamicin (GARAMYCIN) 0.1 % ointment Apply topically 3 times daily Apply topically 3 times daily.    sulfamethoxazole-trimethoprim (BACTRIM DS;SEPTRA DS) 800-160 MG per tablet Take 1 tablet by mouth 2 times daily    rosuvastatin (CRESTOR) 5 MG tablet Take 1 tablet by mouth daily    warfarin (COUMADIN) 5 MG tablet Take 7.5mg Saturday and 5mg all other days    levothyroxine (SYNTHROID) 200 MCG tablet Take 1 tablet by mouth every morning (before breakfast)    albuterol sulfate HFA (PROVENTIL;VENTOLIN;PROAIR) 108 (90 Base) MCG/ACT inhaler Inhale 2 puffs into the lungs every 4 hours as needed for Wheezing or Shortness of Breath    losartan (COZAAR) 100 MG tablet Take 1 tablet by mouth daily    Multiple Vitamin (MULTI-VITAMIN DAILY PO) Take 1 tablet by mouth daily    pantoprazole (PROTONIX) 40 MG tablet Take 20 mg by mouth every other day     No current facility-administered medications for this visit.       Physical Examination: General appearance - alert, well appearing, and in no distress  Mental status - alert, oriented to person, place, and time      Assessment/ Plan:   Jared was seen today for follow-up.    Diagnoses and all orders for this visit:    Long term (current) use of anticoagulants  -     AMB

## 2024-01-26 NOTE — PATIENT INSTRUCTIONS
professional. [employer] disclaims any warranty or liability for your use of this information.

## 2024-01-26 NOTE — PROGRESS NOTES
Chief Complaint   Patient presents with    Follow-up     INR     Patient presents in office today for INR check.  States that he started bactrim two days ago for an infection on his face.  States that the pharmacist warned him that it may affect his INR.  No other concerns.    1. \"Have you been to the ER, urgent care clinic since your last visit?  Hospitalized since your last visit?\" No    2. \"Have you seen or consulted any other health care providers outside of the [facility] since your last visit?\" No     3. For patients aged 45-75: Has the patient had a colonoscopy / FIT/ Cologuard? No      If the patient is female:    4. For patients aged 40-74: Has the patient had a mammogram within the past 2 years? NA - based on age or sex      5. For patients aged 21-65: Has the patient had a pap smear? NA - based on age or sex

## 2024-02-07 ENCOUNTER — OFFICE VISIT (OUTPATIENT)
Age: 74
End: 2024-02-07
Payer: MEDICARE

## 2024-02-07 VITALS
RESPIRATION RATE: 18 BRPM | BODY MASS INDEX: 22.52 KG/M2 | HEART RATE: 65 BPM | TEMPERATURE: 97.7 F | DIASTOLIC BLOOD PRESSURE: 93 MMHG | OXYGEN SATURATION: 98 % | WEIGHT: 148.6 LBS | SYSTOLIC BLOOD PRESSURE: 145 MMHG | HEIGHT: 68 IN

## 2024-02-07 DIAGNOSIS — Z79.01 WARFARIN ANTICOAGULATION: Primary | ICD-10-CM

## 2024-02-07 LAB
POC INR: 2.6
PROTHROMBIN TIME, POC: 31.2

## 2024-02-07 PROCEDURE — G8484 FLU IMMUNIZE NO ADMIN: HCPCS | Performed by: FAMILY MEDICINE

## 2024-02-07 PROCEDURE — PBSHW AMB POC PT/INR: Performed by: FAMILY MEDICINE

## 2024-02-07 PROCEDURE — 1036F TOBACCO NON-USER: CPT | Performed by: FAMILY MEDICINE

## 2024-02-07 PROCEDURE — 99213 OFFICE O/P EST LOW 20 MIN: CPT | Performed by: FAMILY MEDICINE

## 2024-02-07 PROCEDURE — 3077F SYST BP >= 140 MM HG: CPT | Performed by: FAMILY MEDICINE

## 2024-02-07 PROCEDURE — 3080F DIAST BP >= 90 MM HG: CPT | Performed by: FAMILY MEDICINE

## 2024-02-07 PROCEDURE — G8420 CALC BMI NORM PARAMETERS: HCPCS | Performed by: FAMILY MEDICINE

## 2024-02-07 PROCEDURE — 85610 PROTHROMBIN TIME: CPT | Performed by: FAMILY MEDICINE

## 2024-02-07 PROCEDURE — G8427 DOCREV CUR MEDS BY ELIG CLIN: HCPCS | Performed by: FAMILY MEDICINE

## 2024-02-07 PROCEDURE — 3017F COLORECTAL CA SCREEN DOC REV: CPT | Performed by: FAMILY MEDICINE

## 2024-02-07 PROCEDURE — 1123F ACP DISCUSS/DSCN MKR DOCD: CPT | Performed by: FAMILY MEDICINE

## 2024-02-07 NOTE — PROGRESS NOTES
Progress Note    he is a 73 y.o. year old male who presents for evaluation.    Subjective:     Pt here for INR check currently at goal.  No longer on bactrim.      Reviewed PmHx, RxHx, FmHx, SocHx, AllgHx and updated and dated in the chart.    Review of Systems - negative except as listed above in the HPI    Objective:     Vitals:    02/07/24 0953   BP: (!) 145/93   Pulse: 65   Resp: 18   Temp: 97.7 °F (36.5 °C)   TempSrc: Oral   SpO2: 98%   Weight: 67.4 kg (148 lb 9.6 oz)   Height: 1.727 m (5' 8\")       Current Outpatient Medications   Medication Sig    gentamicin (GARAMYCIN) 0.1 % ointment Apply topically 3 times daily Apply topically 3 times daily.    rosuvastatin (CRESTOR) 5 MG tablet Take 1 tablet by mouth daily    warfarin (COUMADIN) 5 MG tablet Take 7.5mg Saturday and 5mg all other days    levothyroxine (SYNTHROID) 200 MCG tablet Take 1 tablet by mouth every morning (before breakfast)    albuterol sulfate HFA (PROVENTIL;VENTOLIN;PROAIR) 108 (90 Base) MCG/ACT inhaler Inhale 2 puffs into the lungs every 4 hours as needed for Wheezing or Shortness of Breath    losartan (COZAAR) 100 MG tablet Take 1 tablet by mouth daily    Multiple Vitamin (MULTI-VITAMIN DAILY PO) Take 1 tablet by mouth daily    pantoprazole (PROTONIX) 40 MG tablet Take 20 mg by mouth every other day     No current facility-administered medications for this visit.       Physical Examination: General appearance - alert, well appearing, and in no distress  Mental status - alert, oriented to person, place, and time      Assessment/ Plan:   Jared was seen today for anticoagulation, hypertension and thyroid problem.    Diagnoses and all orders for this visit:    Warfarin anticoagulation  -     AMB POC PT/INR     Continue current dosing follow up in 4-6. 2.6 @ goal  Return if symptoms worsen or fail to improve.        I have discussed the diagnosis with the patient and the intended plan as seen in the above orders.  The patient has received an 
Chief Complaint   Patient presents with    Anticoagulation    Hypertension    Thyroid Problem     1. Have you been to the ER, urgent care clinic since your last visit?  Hospitalized since your last visit?No    2. Have you seen or consulted any other health care providers outside of the [facility] since your last visit?  Include any pap smears or colon screening. Yes Where: dermatolgy    Results for orders placed or performed in visit on 02/07/24   AMB POC PT/INR   Result Value Ref Range    Prothrombin time, POC 31.2     POC INR 2.6          
no

## 2024-03-19 ENCOUNTER — OFFICE VISIT (OUTPATIENT)
Age: 74
End: 2024-03-19
Payer: MEDICARE

## 2024-03-19 VITALS
TEMPERATURE: 97.6 F | HEART RATE: 67 BPM | RESPIRATION RATE: 18 BRPM | HEIGHT: 68 IN | DIASTOLIC BLOOD PRESSURE: 88 MMHG | OXYGEN SATURATION: 92 % | WEIGHT: 149 LBS | BODY MASS INDEX: 22.58 KG/M2 | SYSTOLIC BLOOD PRESSURE: 144 MMHG

## 2024-03-19 DIAGNOSIS — R73.9 ELEVATED BLOOD SUGAR: ICD-10-CM

## 2024-03-19 DIAGNOSIS — E03.9 ACQUIRED HYPOTHYROIDISM: ICD-10-CM

## 2024-03-19 DIAGNOSIS — C34.11 MALIGNANT NEOPLASM OF UPPER LOBE OF RIGHT LUNG (HCC): ICD-10-CM

## 2024-03-19 DIAGNOSIS — I10 PRIMARY HYPERTENSION: Primary | ICD-10-CM

## 2024-03-19 DIAGNOSIS — Z79.01 WARFARIN ANTICOAGULATION: ICD-10-CM

## 2024-03-19 DIAGNOSIS — C61 PROSTATE CANCER (HCC): ICD-10-CM

## 2024-03-19 LAB
ALBUMIN SERPL-MCNC: 4.6 G/DL (ref 3.5–5)
ALBUMIN/GLOB SERPL: 1.6 (ref 1.1–2.2)
ALP SERPL-CCNC: 54 U/L (ref 45–117)
ALT SERPL-CCNC: 27 U/L (ref 12–78)
ANION GAP SERPL CALC-SCNC: 4 MMOL/L (ref 5–15)
AST SERPL-CCNC: 15 U/L (ref 15–37)
BILIRUB SERPL-MCNC: 0.9 MG/DL (ref 0.2–1)
BUN SERPL-MCNC: 19 MG/DL (ref 6–20)
BUN/CREAT SERPL: 14 (ref 12–20)
CALCIUM SERPL-MCNC: 10 MG/DL (ref 8.5–10.1)
CHLORIDE SERPL-SCNC: 103 MMOL/L (ref 97–108)
CHOLEST SERPL-MCNC: 160 MG/DL
CO2 SERPL-SCNC: 29 MMOL/L (ref 21–32)
CREAT SERPL-MCNC: 1.35 MG/DL (ref 0.7–1.3)
EST. AVERAGE GLUCOSE BLD GHB EST-MCNC: 91 MG/DL
GLOBULIN SER CALC-MCNC: 2.9 G/DL (ref 2–4)
GLUCOSE SERPL-MCNC: 97 MG/DL (ref 65–100)
HBA1C MFR BLD: 4.8 % (ref 4–5.6)
HDLC SERPL-MCNC: 76 MG/DL
HDLC SERPL: 2.1 (ref 0–5)
LDLC SERPL CALC-MCNC: 65.4 MG/DL (ref 0–100)
POC INR: 2.9
POTASSIUM SERPL-SCNC: 5 MMOL/L (ref 3.5–5.1)
PROT SERPL-MCNC: 7.5 G/DL (ref 6.4–8.2)
PROTHROMBIN TIME, POC: 34.3
SODIUM SERPL-SCNC: 136 MMOL/L (ref 136–145)
TRIGL SERPL-MCNC: 93 MG/DL
TSH SERPL DL<=0.05 MIU/L-ACNC: 3.47 UIU/ML (ref 0.36–3.74)
VLDLC SERPL CALC-MCNC: 18.6 MG/DL

## 2024-03-19 PROCEDURE — G8427 DOCREV CUR MEDS BY ELIG CLIN: HCPCS | Performed by: FAMILY MEDICINE

## 2024-03-19 PROCEDURE — 1123F ACP DISCUSS/DSCN MKR DOCD: CPT | Performed by: FAMILY MEDICINE

## 2024-03-19 PROCEDURE — 3017F COLORECTAL CA SCREEN DOC REV: CPT | Performed by: FAMILY MEDICINE

## 2024-03-19 PROCEDURE — G8484 FLU IMMUNIZE NO ADMIN: HCPCS | Performed by: FAMILY MEDICINE

## 2024-03-19 PROCEDURE — 99214 OFFICE O/P EST MOD 30 MIN: CPT | Performed by: FAMILY MEDICINE

## 2024-03-19 PROCEDURE — 1036F TOBACCO NON-USER: CPT | Performed by: FAMILY MEDICINE

## 2024-03-19 PROCEDURE — 3077F SYST BP >= 140 MM HG: CPT | Performed by: FAMILY MEDICINE

## 2024-03-19 PROCEDURE — G8420 CALC BMI NORM PARAMETERS: HCPCS | Performed by: FAMILY MEDICINE

## 2024-03-19 PROCEDURE — PBSHW AMB POC PT/INR: Performed by: FAMILY MEDICINE

## 2024-03-19 PROCEDURE — 3079F DIAST BP 80-89 MM HG: CPT | Performed by: FAMILY MEDICINE

## 2024-03-19 PROCEDURE — 85610 PROTHROMBIN TIME: CPT | Performed by: FAMILY MEDICINE

## 2024-03-19 RX ORDER — BUDESONIDE, GLYCOPYRROLATE, AND FORMOTEROL FUMARATE 160; 9; 4.8 UG/1; UG/1; UG/1
1 AEROSOL, METERED RESPIRATORY (INHALATION) DAILY
COMMUNITY

## 2024-03-19 ASSESSMENT — PATIENT HEALTH QUESTIONNAIRE - PHQ9
SUM OF ALL RESPONSES TO PHQ9 QUESTIONS 1 & 2: 0
SUM OF ALL RESPONSES TO PHQ QUESTIONS 1-9: 0
1. LITTLE INTEREST OR PLEASURE IN DOING THINGS: NOT AT ALL
2. FEELING DOWN, DEPRESSED OR HOPELESS: NOT AT ALL
SUM OF ALL RESPONSES TO PHQ QUESTIONS 1-9: 0

## 2024-03-19 NOTE — PROGRESS NOTES
Chief Complaint   Patient presents with    Anticoagulation    Lab Collection     Fasting today     \"Have you been to the ER, urgent care clinic since your last visit?  Hospitalized since your last visit?\"    NO    “Have you seen or consulted any other health care providers outside of [facility] since your last visit?”    NO      “Have you had a colorectal cancer screening such as a colonoscopy/FIT/Cologuard?    YES - Type: Colonoscopy - Where: [facility] 12/7/23 Nurse/CMA to request most recent records if not in the chart     Date of last Colonoscopy: 6/12/2018  No cologuard on file  No FIT/FOBT on file   No flexible sigmoidoscopy on file     Results for orders placed or performed in visit on 03/19/24   AMB POC PT/INR   Result Value Ref Range    Prothrombin time, POC 34.3     POC INR 2.9            Click Here for Release of Records Request    
12 - 20      Est, Glom Filt Rate 55 (L) >60 ml/min/1.73m2    Calcium 10.0 8.5 - 10.1 MG/DL    Total Bilirubin 0.9 0.2 - 1.0 MG/DL    ALT 27 12 - 78 U/L    AST 15 15 - 37 U/L    Alk Phosphatase 54 45 - 117 U/L    Total Protein 7.5 6.4 - 8.2 g/dL    Albumin 4.6 3.5 - 5.0 g/dL    Globulin 2.9 2.0 - 4.0 g/dL    Albumin/Globulin Ratio 1.6 1.1 - 2.2     Hemoglobin A1C   Result Value Ref Range    Hemoglobin A1C 4.8 4.0 - 5.6 %    Estimated Avg Glucose 91 mg/dL   AMB POC PT/INR   Result Value Ref Range    Prothrombin time, POC 34.3     POC INR 2.9         3. Acquired hypothyroidism  E03.9 TSH      4. Malignant neoplasm of upper lobe of right lung (HCC)  C34.11    -stable   5. Prostate cancer (HCC)  C61       6. Elevated blood sugar  R73.9 Hemoglobin A1C     Comprehensive Metabolic Panel           Time was used for level of billing: yes, 30 minutes reviewing previous notes, test results and office visit with the patient discussing the diagnosis and importance of compliance with the treatment plan as well as documenting on the day of the visit.      Orders Placed This Encounter    Hemoglobin A1C     Standing Status:   Future     Standing Expiration Date:   3/19/2025    Comprehensive Metabolic Panel     Standing Status:   Future     Standing Expiration Date:   3/19/2025    Lipid Panel     Standing Status:   Future     Standing Expiration Date:   3/19/2025     Order Specific Question:   Has the patient fasted?     Answer:   Yes    TSH     Standing Status:   Future     Standing Expiration Date:   3/19/2025    AMB POC PT/INR    Budeson-Glycopyrrol-Formoterol (AFrame DigitalZTRI AEROSPHERE) 160-9-4.8 MCG/ACT AERO     Sig: Inhale 1 puff into the lungs daily      Follow-up and Dispositions    Return in about 6 months (around 9/19/2024).          I have discussed the diagnosis with the patient and the intended plan as seen in the above orders.  The patient understands and agrees with the plan. The patient has received an after-visit summary and

## 2024-04-03 ENCOUNTER — HOSPITAL ENCOUNTER (OUTPATIENT)
Facility: HOSPITAL | Age: 74
Discharge: HOME | End: 2024-04-06
Attending: INTERNAL MEDICINE
Payer: MEDICARE

## 2024-04-03 DIAGNOSIS — C34.11 CANCER OF UPPER LOBE OF RIGHT LUNG (HCC): ICD-10-CM

## 2024-04-03 PROCEDURE — 71250 CT THORAX DX C-: CPT

## 2024-05-08 ENCOUNTER — OFFICE VISIT (OUTPATIENT)
Age: 74
End: 2024-05-08
Payer: MEDICARE

## 2024-05-08 VITALS
DIASTOLIC BLOOD PRESSURE: 82 MMHG | SYSTOLIC BLOOD PRESSURE: 131 MMHG | HEIGHT: 68 IN | BODY MASS INDEX: 22.43 KG/M2 | OXYGEN SATURATION: 96 % | HEART RATE: 83 BPM | TEMPERATURE: 97.7 F | WEIGHT: 148 LBS | RESPIRATION RATE: 16 BRPM

## 2024-05-08 DIAGNOSIS — J43.2 CENTRILOBULAR EMPHYSEMA (HCC): ICD-10-CM

## 2024-05-08 DIAGNOSIS — Z79.01 WARFARIN ANTICOAGULATION: Primary | ICD-10-CM

## 2024-05-08 DIAGNOSIS — D68.9 COAGULATION DEFICIENCY (HCC): ICD-10-CM

## 2024-05-08 LAB
POC INR: 3.1
PROTHROMBIN TIME, POC: 37.2

## 2024-05-08 PROCEDURE — 3079F DIAST BP 80-89 MM HG: CPT | Performed by: FAMILY MEDICINE

## 2024-05-08 PROCEDURE — 99213 OFFICE O/P EST LOW 20 MIN: CPT | Performed by: FAMILY MEDICINE

## 2024-05-08 PROCEDURE — PBSHW AMB POC PT/INR: Performed by: FAMILY MEDICINE

## 2024-05-08 PROCEDURE — 3017F COLORECTAL CA SCREEN DOC REV: CPT | Performed by: FAMILY MEDICINE

## 2024-05-08 PROCEDURE — G8420 CALC BMI NORM PARAMETERS: HCPCS | Performed by: FAMILY MEDICINE

## 2024-05-08 PROCEDURE — 85610 PROTHROMBIN TIME: CPT | Performed by: FAMILY MEDICINE

## 2024-05-08 PROCEDURE — 3075F SYST BP GE 130 - 139MM HG: CPT | Performed by: FAMILY MEDICINE

## 2024-05-08 PROCEDURE — 1123F ACP DISCUSS/DSCN MKR DOCD: CPT | Performed by: FAMILY MEDICINE

## 2024-05-08 PROCEDURE — G8427 DOCREV CUR MEDS BY ELIG CLIN: HCPCS | Performed by: FAMILY MEDICINE

## 2024-05-08 PROCEDURE — 1036F TOBACCO NON-USER: CPT | Performed by: FAMILY MEDICINE

## 2024-05-08 PROCEDURE — 3023F SPIROM DOC REV: CPT | Performed by: FAMILY MEDICINE

## 2024-05-08 NOTE — PROGRESS NOTES
Chief Complaint   Patient presents with    Follow-up     INR      Patient presents in office today for INR check.  States that he was recently on Bactrim and ready that if affects warfarin levels.  He states that he stopped taking it over the weekend.  No other concerns.    \"Have you been to the ER, urgent care clinic since your last visit?  Hospitalized since your last visit?\"    NO    “Have you seen or consulted any other health care providers outside of [facility] since your last visit?”    NO      “Have you had a colorectal cancer screening such as a colonoscopy/FIT/Cologuard?    NO    Date of last Colonoscopy: 6/12/2018  No cologuard on file  No FIT/FOBT on file   No flexible sigmoidoscopy on file         Click Here for Release of Records Request

## 2024-05-08 NOTE — PATIENT INSTRUCTIONS
professional. [employer] disclaims any warranty or liability for your use of this information.

## 2024-05-08 NOTE — PROGRESS NOTES
Progress Note    he is a 73 y.o. year old male who presents for evaluation.    Subjective:     Pt here for INR check and was just on bactrim for his acne and he stopped the bactrim and is using mupirocin topical.  He is slightly high today and cause known(abx use).  No bleeding issues.  Following with pulmonary for COPD and lung cancer history.  Reviewed PmHx, RxHx, FmHx, SocHx, AllgHx and updated and dated in the chart.    Review of Systems - negative except as listed above in the HPI    Objective:     Vitals:    05/08/24 1012   BP: 131/82   Site: Left Upper Arm   Position: Sitting   Pulse: 83   Resp: 16   Temp: 97.7 °F (36.5 °C)   TempSrc: Oral   SpO2: 96%   Weight: 67.1 kg (148 lb)   Height: 1.727 m (5' 8\")       Current Outpatient Medications   Medication Sig    Budeson-Glycopyrrol-Formoterol (BREZTRI AEROSPHERE) 160-9-4.8 MCG/ACT AERO Inhale 1 puff into the lungs daily    gentamicin (GARAMYCIN) 0.1 % ointment Apply topically 3 times daily Apply topically 3 times daily.    rosuvastatin (CRESTOR) 5 MG tablet Take 1 tablet by mouth daily    warfarin (COUMADIN) 5 MG tablet Take 7.5mg Saturday and 5mg all other days    levothyroxine (SYNTHROID) 200 MCG tablet Take 1 tablet by mouth every morning (before breakfast)    albuterol sulfate HFA (PROVENTIL;VENTOLIN;PROAIR) 108 (90 Base) MCG/ACT inhaler Inhale 2 puffs into the lungs every 4 hours as needed for Wheezing or Shortness of Breath    losartan (COZAAR) 100 MG tablet Take 1 tablet by mouth daily    Multiple Vitamin (MULTI-VITAMIN DAILY PO) Take 1 tablet by mouth daily     No current facility-administered medications for this visit.       Physical Examination: General appearance - alert, well appearing, and in no distress  Mental status - alert, oriented to person, place, and time  Chest - clear to auscultation, no wheezes, rales or rhonchi, symmetric air entry  Heart - normal rate, regular rhythm, normal S1, S2, no murmurs, rubs, clicks or gallops

## 2024-05-29 ENCOUNTER — HOSPITAL ENCOUNTER (OUTPATIENT)
Facility: HOSPITAL | Age: 74
Discharge: HOME | End: 2024-06-01

## 2024-05-29 VITALS
BODY MASS INDEX: 22.73 KG/M2 | OXYGEN SATURATION: 99 % | WEIGHT: 150 LBS | HEART RATE: 63 BPM | HEIGHT: 68 IN | DIASTOLIC BLOOD PRESSURE: 104 MMHG | SYSTOLIC BLOOD PRESSURE: 174 MMHG | RESPIRATION RATE: 16 BRPM

## 2024-05-29 DIAGNOSIS — C61 PROSTATE CANCER (HCC): Primary | ICD-10-CM

## 2024-05-29 ASSESSMENT — PAIN SCALES - GENERAL: PAINLEVEL_OUTOF10: 0

## 2024-05-29 NOTE — PROGRESS NOTES
prostate volume of 44cc and a PIRADS-5 lesion in the left mid gland anterior/lateral PZ measuring 2.4cm with possible microscopic EPE.  No SVI, adenopathy, or osseous lesions.  3/31/22 - TRUS 12 core prostate + 1 MR target fusion biopsy shows a prostate volume of 45.21cc, cT1c, PSA density 0.13:        - Right apex medial: benign       - Right apex lateral:   benign       - Right mid medial:   benign       - Right mid lateral:   benign       - Right base medial:  benign       - Right base lateral:   benign       - Left apex medial: adenocarcinoma, Traver 4+3=7 involving 70% of 1/1 core. Pattern 4 comprises 60%.       - Left apex lateral:   adenocarcinoma, Traver 4+3=7 involving 40% of 1/1 core. Pattern 4 comprises 60%.       - Left mid medial: adenocarcinoma, Traver 3+4=7 involving 10% of 1/1 core. Pattern 4 comprises 30%.       - Left mid lateral:   adenocarcinoma, Ngozi 4+3=7 involving 40% of 1/1 core. Pattern 4 comprises 80%.       - Left base medial:   adenocarcinoma, Traver 4+3=7 involving 5% of 1/1 core.        - Left base lateral:  adenocarcinoma, Ngozi 4+3=7 involving 40% of 1/1 core. Pattern 4 comprises 70%.       - Target left mid PZ                  o  adenocarcinoma, Traver 4+3=7 involving 15% of 3/3 cores. Pattern 4 comprises 60%.                  o  adenocarcinoma, Traver 4+3=7 involving 15% of 2/3 cores. Pattern 4 comprises 80%.  8/16/22 - attempted RALP but aborted due to patient had PEA arrest  Pre-IMRT baseline symptoms:  : IPSS 15/35 (2,3,1,1,3,2,3) QoL 4/mostly dissatisfied.  Not on medication for these symptoms.  No incontinence, dysuria, or hematuria.    GI: no IBD; last colonoscopy ~2018.  Sexual health: mild ED, MELISSA 14.  Not on PDE-5 inhibitor.  Has difficult time ejaculating so not very sexually active.  History of RUL lung cancer treated with RUL lobectomy by Dr. Gil Howard on 10/6/21.  No adjuvant chemotherapy or radiation required.  On coumadin and has IVC filter for history

## 2024-06-03 SDOH — ECONOMIC STABILITY: FOOD INSECURITY: WITHIN THE PAST 12 MONTHS, YOU WORRIED THAT YOUR FOOD WOULD RUN OUT BEFORE YOU GOT MONEY TO BUY MORE.: NEVER TRUE

## 2024-06-03 SDOH — ECONOMIC STABILITY: FOOD INSECURITY: WITHIN THE PAST 12 MONTHS, THE FOOD YOU BOUGHT JUST DIDN'T LAST AND YOU DIDN'T HAVE MONEY TO GET MORE.: NEVER TRUE

## 2024-06-03 SDOH — ECONOMIC STABILITY: INCOME INSECURITY: HOW HARD IS IT FOR YOU TO PAY FOR THE VERY BASICS LIKE FOOD, HOUSING, MEDICAL CARE, AND HEATING?: NOT HARD AT ALL

## 2024-06-06 ENCOUNTER — OFFICE VISIT (OUTPATIENT)
Age: 74
End: 2024-06-06
Payer: MEDICARE

## 2024-06-06 VITALS
BODY MASS INDEX: 22.43 KG/M2 | HEART RATE: 67 BPM | RESPIRATION RATE: 18 BRPM | WEIGHT: 148 LBS | OXYGEN SATURATION: 95 % | TEMPERATURE: 97.7 F | DIASTOLIC BLOOD PRESSURE: 84 MMHG | HEIGHT: 68 IN | SYSTOLIC BLOOD PRESSURE: 125 MMHG

## 2024-06-06 DIAGNOSIS — Z79.01 WARFARIN ANTICOAGULATION: Primary | ICD-10-CM

## 2024-06-06 LAB
POC INR: 2.2
PROTHROMBIN TIME, POC: 26.3

## 2024-06-06 PROCEDURE — 85610 PROTHROMBIN TIME: CPT | Performed by: NURSE PRACTITIONER

## 2024-06-06 PROCEDURE — 99213 OFFICE O/P EST LOW 20 MIN: CPT | Performed by: NURSE PRACTITIONER

## 2024-06-06 SDOH — ECONOMIC STABILITY: FOOD INSECURITY: WITHIN THE PAST 12 MONTHS, YOU WORRIED THAT YOUR FOOD WOULD RUN OUT BEFORE YOU GOT MONEY TO BUY MORE.: NEVER TRUE

## 2024-06-06 SDOH — ECONOMIC STABILITY: FOOD INSECURITY: WITHIN THE PAST 12 MONTHS, THE FOOD YOU BOUGHT JUST DIDN'T LAST AND YOU DIDN'T HAVE MONEY TO GET MORE.: NEVER TRUE

## 2024-06-06 SDOH — ECONOMIC STABILITY: INCOME INSECURITY: HOW HARD IS IT FOR YOU TO PAY FOR THE VERY BASICS LIKE FOOD, HOUSING, MEDICAL CARE, AND HEATING?: NOT HARD AT ALL

## 2024-06-06 NOTE — PROGRESS NOTES
Anticoagulation  Patient here for followup of chronic anticoagulation.  Indication: atrial fibrillation.  Bleeding Signs/Symptoms:  None.  INR's are drawn regularly and have been Therapeutic.    INR today is  2.2.    Plan: Coumadin dosing-   no change     Recheck INR in 4 week(s)  I have discussed the diagnosis with the patient and the intended plan as seen in the above orders. The patient has received an after-visit summary and questions were answered concerning future plans. Patient conveyed understanding of the plan at the time of the visit.    Vanessa Maldonado, MSN, ANP  6/6/2024

## 2024-06-06 NOTE — PROGRESS NOTES
Chief Complaint   Patient presents with    Office Visit for Anticoagulation Management     INR     Patient is in the office today for a INR check.    No other concerns.      \"Have you been to the ER, urgent care clinic since your last visit?  Hospitalized since your last visit?\"    NO    “Have you seen or consulted any other health care providers outside of [facility] since your last visit?”    NO      “Have you had a colorectal cancer screening such as a colonoscopy/FIT/Cologuard?    NO    Date of last Colonoscopy: 6/12/2018  No cologuard on file  No FIT/FOBT on file   No flexible sigmoidoscopy on file         Click Here for Release of Records Request

## 2024-07-25 ENCOUNTER — OFFICE VISIT (OUTPATIENT)
Age: 74
End: 2024-07-25
Payer: MEDICARE

## 2024-07-25 VITALS
TEMPERATURE: 98 F | OXYGEN SATURATION: 86 % | HEART RATE: 66 BPM | HEIGHT: 68 IN | SYSTOLIC BLOOD PRESSURE: 122 MMHG | RESPIRATION RATE: 19 BRPM | BODY MASS INDEX: 22.63 KG/M2 | DIASTOLIC BLOOD PRESSURE: 84 MMHG | WEIGHT: 149.3 LBS

## 2024-07-25 DIAGNOSIS — I10 PRIMARY HYPERTENSION: ICD-10-CM

## 2024-07-25 DIAGNOSIS — Z79.01 WARFARIN ANTICOAGULATION: Primary | ICD-10-CM

## 2024-07-25 LAB
POC INR: 2.5
PROTHROMBIN TIME, POC: 30.1

## 2024-07-25 PROCEDURE — 1123F ACP DISCUSS/DSCN MKR DOCD: CPT | Performed by: NURSE PRACTITIONER

## 2024-07-25 PROCEDURE — 3017F COLORECTAL CA SCREEN DOC REV: CPT | Performed by: NURSE PRACTITIONER

## 2024-07-25 PROCEDURE — 99213 OFFICE O/P EST LOW 20 MIN: CPT | Performed by: NURSE PRACTITIONER

## 2024-07-25 PROCEDURE — 3079F DIAST BP 80-89 MM HG: CPT | Performed by: NURSE PRACTITIONER

## 2024-07-25 PROCEDURE — G8420 CALC BMI NORM PARAMETERS: HCPCS | Performed by: NURSE PRACTITIONER

## 2024-07-25 PROCEDURE — PBSHW AMB POC PT/INR: Performed by: NURSE PRACTITIONER

## 2024-07-25 PROCEDURE — 85610 PROTHROMBIN TIME: CPT | Performed by: NURSE PRACTITIONER

## 2024-07-25 PROCEDURE — G8427 DOCREV CUR MEDS BY ELIG CLIN: HCPCS | Performed by: NURSE PRACTITIONER

## 2024-07-25 PROCEDURE — 3074F SYST BP LT 130 MM HG: CPT | Performed by: NURSE PRACTITIONER

## 2024-07-25 PROCEDURE — 1036F TOBACCO NON-USER: CPT | Performed by: NURSE PRACTITIONER

## 2024-07-25 RX ORDER — LOSARTAN POTASSIUM 100 MG/1
100 TABLET ORAL DAILY
Qty: 90 TABLET | Refills: 3 | Status: SHIPPED | OUTPATIENT
Start: 2024-07-25

## 2024-07-25 NOTE — PROGRESS NOTES
Chief Complaint   Patient presents with    Office Visit for Anticoagulation Management     Patient is in the office today for a INR check.  Patient stated stated he has puffiness under his right eye since last week. Stated he got a cyst removed near the eye.   Patient had to stop the blood thinners.  No other concerns.    \"Have you been to the ER, urgent care clinic since your last visit?  Hospitalized since your last visit?\"    NO    “Have you seen or consulted any other health care providers outside of [facility] since your last visit?”    NO      “Have you had a colorectal cancer screening such as a colonoscopy/FIT/Cologuard?    NO    Date of last Colonoscopy: 6/12/2018  No cologuard on file  No FIT/FOBT on file   No flexible sigmoidoscopy on file         Click Here for Release of Records Request

## 2024-07-25 NOTE — PROGRESS NOTES
Anticoagulation  Patient here for followup of chronic anticoagulation.  Indication: atrial fibrillation.  Bleeding Signs/Symptoms:  None.  INR's are drawn regularly and have been Therapeutic.    INR today is  2.5.    Plan: Coumadin dosing-   no change     Recheck INR in 4 week(s)    I have discussed the diagnosis with the patient and the intended plan as seen in the above orders. The patient has received an after-visit summary and questions were answered concerning future plans. Patient conveyed understanding of the plan at the time of the visit.    Vanessa Maldonado, MSN, ANP  7/25/2024

## 2024-09-17 ENCOUNTER — OFFICE VISIT (OUTPATIENT)
Age: 74
End: 2024-09-17
Payer: MEDICARE

## 2024-09-17 VITALS
HEIGHT: 68 IN | RESPIRATION RATE: 19 BRPM | DIASTOLIC BLOOD PRESSURE: 90 MMHG | HEART RATE: 67 BPM | BODY MASS INDEX: 22.73 KG/M2 | TEMPERATURE: 97.7 F | OXYGEN SATURATION: 99 % | WEIGHT: 150 LBS | SYSTOLIC BLOOD PRESSURE: 144 MMHG

## 2024-09-17 DIAGNOSIS — Z00.00 WELL EXAM WITHOUT ABNORMAL FINDINGS OF PATIENT 18 YEARS OF AGE OR OLDER: Primary | ICD-10-CM

## 2024-09-17 DIAGNOSIS — Z79.01 WARFARIN ANTICOAGULATION: ICD-10-CM

## 2024-09-17 DIAGNOSIS — E03.9 ACQUIRED HYPOTHYROIDISM: ICD-10-CM

## 2024-09-17 DIAGNOSIS — R73.9 ELEVATED BLOOD SUGAR: ICD-10-CM

## 2024-09-17 DIAGNOSIS — E78.5 HYPERLIPIDEMIA LDL GOAL <70: ICD-10-CM

## 2024-09-17 DIAGNOSIS — I10 PRIMARY HYPERTENSION: ICD-10-CM

## 2024-09-17 LAB
ALBUMIN SERPL-MCNC: 4.2 G/DL (ref 3.5–5)
ALBUMIN/GLOB SERPL: 1.5 (ref 1.1–2.2)
ALP SERPL-CCNC: 59 U/L (ref 45–117)
ALT SERPL-CCNC: 16 U/L (ref 12–78)
ANION GAP SERPL CALC-SCNC: 4 MMOL/L (ref 2–12)
AST SERPL-CCNC: 16 U/L (ref 15–37)
BASOPHILS # BLD: 0.1 K/UL (ref 0–0.1)
BASOPHILS NFR BLD: 1 % (ref 0–1)
BILIRUB SERPL-MCNC: 0.5 MG/DL (ref 0.2–1)
BUN SERPL-MCNC: 16 MG/DL (ref 6–20)
BUN/CREAT SERPL: 14 (ref 12–20)
CALCIUM SERPL-MCNC: 9.6 MG/DL (ref 8.5–10.1)
CHLORIDE SERPL-SCNC: 102 MMOL/L (ref 97–108)
CHOLEST SERPL-MCNC: 120 MG/DL
CO2 SERPL-SCNC: 31 MMOL/L (ref 21–32)
CREAT SERPL-MCNC: 1.15 MG/DL (ref 0.7–1.3)
DIFFERENTIAL METHOD BLD: ABNORMAL
EOSINOPHIL # BLD: 0.3 K/UL (ref 0–0.4)
EOSINOPHIL NFR BLD: 4 % (ref 0–7)
ERYTHROCYTE [DISTWIDTH] IN BLOOD BY AUTOMATED COUNT: 11.9 % (ref 11.5–14.5)
EST. AVERAGE GLUCOSE BLD GHB EST-MCNC: 94 MG/DL
GLOBULIN SER CALC-MCNC: 2.8 G/DL (ref 2–4)
GLUCOSE SERPL-MCNC: 80 MG/DL (ref 65–100)
HBA1C MFR BLD: 4.9 % (ref 4–5.6)
HCT VFR BLD AUTO: 39.3 % (ref 36.6–50.3)
HDLC SERPL-MCNC: 68 MG/DL
HDLC SERPL: 1.8 (ref 0–5)
HGB BLD-MCNC: 13.3 G/DL (ref 12.1–17)
IMM GRANULOCYTES # BLD AUTO: 0.1 K/UL (ref 0–0.04)
IMM GRANULOCYTES NFR BLD AUTO: 1 % (ref 0–0.5)
LDLC SERPL CALC-MCNC: 33.4 MG/DL (ref 0–100)
LYMPHOCYTES # BLD: 1.3 K/UL (ref 0.8–3.5)
LYMPHOCYTES NFR BLD: 14 % (ref 12–49)
MCH RBC QN AUTO: 33.4 PG (ref 26–34)
MCHC RBC AUTO-ENTMCNC: 33.8 G/DL (ref 30–36.5)
MCV RBC AUTO: 98.7 FL (ref 80–99)
MONOCYTES # BLD: 0.9 K/UL (ref 0–1)
MONOCYTES NFR BLD: 10 % (ref 5–13)
NEUTS SEG # BLD: 6.8 K/UL (ref 1.8–8)
NEUTS SEG NFR BLD: 70 % (ref 32–75)
NRBC # BLD: 0 K/UL (ref 0–0.01)
NRBC BLD-RTO: 0 PER 100 WBC
PLATELET # BLD AUTO: 262 K/UL (ref 150–400)
PMV BLD AUTO: 11.6 FL (ref 8.9–12.9)
POC INR: 3.4
POTASSIUM SERPL-SCNC: 4.6 MMOL/L (ref 3.5–5.1)
PROT SERPL-MCNC: 7 G/DL (ref 6.4–8.2)
PROTHROMBIN TIME, POC: 40.8
RBC # BLD AUTO: 3.98 M/UL (ref 4.1–5.7)
SODIUM SERPL-SCNC: 137 MMOL/L (ref 136–145)
TRIGL SERPL-MCNC: 93 MG/DL
TSH SERPL DL<=0.05 MIU/L-ACNC: 0.38 UIU/ML (ref 0.36–3.74)
VLDLC SERPL CALC-MCNC: 18.6 MG/DL
WBC # BLD AUTO: 9.5 K/UL (ref 4.1–11.1)

## 2024-09-17 PROCEDURE — 99214 OFFICE O/P EST MOD 30 MIN: CPT | Performed by: NURSE PRACTITIONER

## 2024-09-17 PROCEDURE — 85610 PROTHROMBIN TIME: CPT | Performed by: NURSE PRACTITIONER

## 2024-09-17 RX ORDER — LEVOTHYROXINE SODIUM 200 UG/1
200 TABLET ORAL DAILY
COMMUNITY
Start: 2024-09-17

## 2024-09-17 ASSESSMENT — LIFESTYLE VARIABLES
HOW OFTEN DURING THE LAST YEAR HAVE YOU FAILED TO DO WHAT WAS NORMALLY EXPECTED FROM YOU BECAUSE OF DRINKING: NEVER
HOW MANY STANDARD DRINKS CONTAINING ALCOHOL DO YOU HAVE ON A TYPICAL DAY: 3 OR 4
HOW OFTEN DURING THE LAST YEAR HAVE YOU NEEDED AN ALCOHOLIC DRINK FIRST THING IN THE MORNING TO GET YOURSELF GOING AFTER A NIGHT OF HEAVY DRINKING: NEVER
HOW OFTEN DURING THE LAST YEAR HAVE YOU BEEN UNABLE TO REMEMBER WHAT HAPPENED THE NIGHT BEFORE BECAUSE YOU HAD BEEN DRINKING: NEVER
HOW OFTEN DURING THE LAST YEAR HAVE YOU FOUND THAT YOU WERE NOT ABLE TO STOP DRINKING ONCE YOU HAD STARTED: NEVER
HOW OFTEN DO YOU HAVE A DRINK CONTAINING ALCOHOL: 2-4 TIMES A MONTH
HAVE YOU OR SOMEONE ELSE BEEN INJURED AS A RESULT OF YOUR DRINKING: NO
HAS A RELATIVE, FRIEND, DOCTOR, OR ANOTHER HEALTH PROFESSIONAL EXPRESSED CONCERN ABOUT YOUR DRINKING OR SUGGESTED YOU CUT DOWN: NO
HOW OFTEN DURING THE LAST YEAR HAVE YOU HAD A FEELING OF GUILT OR REMORSE AFTER DRINKING: NEVER

## 2024-09-17 ASSESSMENT — PATIENT HEALTH QUESTIONNAIRE - PHQ9
1. LITTLE INTEREST OR PLEASURE IN DOING THINGS: NOT AT ALL
SUM OF ALL RESPONSES TO PHQ QUESTIONS 1-9: 0
2. FEELING DOWN, DEPRESSED OR HOPELESS: NOT AT ALL
SUM OF ALL RESPONSES TO PHQ QUESTIONS 1-9: 0
SUM OF ALL RESPONSES TO PHQ9 QUESTIONS 1 & 2: 0

## 2024-10-17 ENCOUNTER — OFFICE VISIT (OUTPATIENT)
Age: 74
End: 2024-10-17
Payer: MEDICARE

## 2024-10-17 VITALS
HEIGHT: 68 IN | OXYGEN SATURATION: 98 % | BODY MASS INDEX: 26.37 KG/M2 | DIASTOLIC BLOOD PRESSURE: 94 MMHG | SYSTOLIC BLOOD PRESSURE: 146 MMHG | WEIGHT: 174 LBS | RESPIRATION RATE: 19 BRPM | HEART RATE: 68 BPM | TEMPERATURE: 98.4 F

## 2024-10-17 DIAGNOSIS — J43.2 CENTRILOBULAR EMPHYSEMA (HCC): ICD-10-CM

## 2024-10-17 DIAGNOSIS — B37.0 THRUSH: ICD-10-CM

## 2024-10-17 DIAGNOSIS — K21.9 GASTROESOPHAGEAL REFLUX DISEASE WITHOUT ESOPHAGITIS: ICD-10-CM

## 2024-10-17 DIAGNOSIS — Z79.01 WARFARIN ANTICOAGULATION: Primary | ICD-10-CM

## 2024-10-17 LAB
POC INR: 4
PROTHROMBIN TIME, POC: 48.1

## 2024-10-17 PROCEDURE — 99214 OFFICE O/P EST MOD 30 MIN: CPT | Performed by: NURSE PRACTITIONER

## 2024-10-17 PROCEDURE — 85610 PROTHROMBIN TIME: CPT | Performed by: NURSE PRACTITIONER

## 2024-10-17 RX ORDER — ALBUTEROL SULFATE 90 UG/1
2 INHALANT RESPIRATORY (INHALATION) EVERY 4 HOURS PRN
Qty: 18 G | Refills: 5 | Status: SHIPPED | OUTPATIENT
Start: 2024-10-17

## 2024-10-17 RX ORDER — NYSTATIN 100000 [USP'U]/ML
500000 SUSPENSION ORAL 4 TIMES DAILY
Qty: 140 ML | Refills: 0 | Status: SHIPPED | OUTPATIENT
Start: 2024-10-17 | End: 2024-10-24

## 2024-10-17 RX ORDER — PANTOPRAZOLE SODIUM 40 MG/1
40 TABLET, DELAYED RELEASE ORAL
Qty: 30 TABLET | Refills: 5 | Status: SHIPPED | OUTPATIENT
Start: 2024-10-17

## 2024-10-17 NOTE — PROGRESS NOTES
Jared Snow (:  1950) is a 74 y.o. male, Established patient, here for evaluation of the following chief complaint(s):  Office Visit for Anticoagulation Management         Assessment & Plan  1. Thrush.  His INR is slightly elevated, likely due to decreased food intake. Nystatin swish and swallow solution has been prescribed, to be taken four times daily after meals and at bedtime. He is advised to abstain from eating or drinking for an hour post-administration.     2. Nausea.  The nausea could be indicative of reflux. Protonix has been prescribed for a duration of two weeks to see if it alleviates symptoms.    3. Medication Management.  His albuterol inhaler prescription has been renewed.    Follow-up  Patient is scheduled for a follow-up visit in 2024.    Results  Laboratory Studies  INR is 4. Thyroid function, cholesterol panel, metabolic panel, blood sugar, kidney function, liver function, protein status, blood count for anemia, iron levels, and A1c for diabetes were all within normal range.  1. Warfarin anticoagulation  -     AMB POC PT/INR  2. Thrush  -     nystatin (MYCOSTATIN) 672415 UNIT/ML suspension; Take 5 mLs by mouth 4 times daily for 7 days After meals and bedtime, Oral, 4 TIMES DAILY Starting Thu 10/17/2024, Until Thu 10/24/2024, For 7 days, Disp-140 mL, R-0, Normal  3. Centrilobular emphysema (HCC)  -     albuterol sulfate HFA (PROVENTIL;VENTOLIN;PROAIR) 108 (90 Base) MCG/ACT inhaler; Inhale 2 puffs into the lungs every 4 hours as needed for Wheezing or Shortness of Breath, Disp-18 g, R-5Whichever least expensive, he wants to use goodrxNormal  4. Gastroesophageal reflux disease without esophagitis  -     pantoprazole (PROTONIX) 40 MG tablet; Take 1 tablet by mouth every morning (before breakfast), Disp-30 tablet, R-5Normal    No follow-ups on file.       Subjective   History of Present Illness  The patient presents for evaluation of multiple medical concerns.    He reports

## 2024-10-17 NOTE — PROGRESS NOTES
Chief Complaint   Patient presents with    Office Visit for Anticoagulation Management     Patient presents in the office today for a INR check.  INR: 4.0  Patient stated needs his albuterol inhaler filled.  Patient stated that he has coating on his tongue.   Patient stated that he has been very fatigue.  Patient stated he has nausea and is gassy.  No other concerns.    \"Have you been to the ER, urgent care clinic since your last visit?  Hospitalized since your last visit?\"    NO    “Have you seen or consulted any other health care providers outside our system since your last visit?”    NO    “Have you had a colorectal cancer screening such as a colonoscopy/FIT/Cologuard?    NO    Date of last Colonoscopy: 6/12/2018  No cologuard on file  No FIT/FOBT on file   No flexible sigmoidoscopy on file

## 2024-11-19 ENCOUNTER — OFFICE VISIT (OUTPATIENT)
Facility: CLINIC | Age: 74
End: 2024-11-19
Payer: MEDICARE

## 2024-11-19 VITALS
BODY MASS INDEX: 22.13 KG/M2 | HEIGHT: 68 IN | SYSTOLIC BLOOD PRESSURE: 154 MMHG | WEIGHT: 146 LBS | HEART RATE: 74 BPM | DIASTOLIC BLOOD PRESSURE: 97 MMHG | RESPIRATION RATE: 18 BRPM | TEMPERATURE: 98.2 F | OXYGEN SATURATION: 98 %

## 2024-11-19 DIAGNOSIS — Z79.01 WARFARIN ANTICOAGULATION: Primary | ICD-10-CM

## 2024-11-19 DIAGNOSIS — B37.0 THRUSH: ICD-10-CM

## 2024-11-19 LAB
POC INR: 3.5
PROTHROMBIN TIME, POC: 41.8

## 2024-11-19 PROCEDURE — 85610 PROTHROMBIN TIME: CPT | Performed by: NURSE PRACTITIONER

## 2024-11-19 NOTE — PROGRESS NOTES
Chief Complaint   Patient presents with    Office Visit for Anticoagulation Management     Patient presents in the office today for a INR check.  INR: 3.5  No other concerns.    \"Have you been to the ER, urgent care clinic since your last visit?  Hospitalized since your last visit?\"    NO    “Have you seen or consulted any other health care providers outside our system since your last visit?”    NO    “Have you had a colorectal cancer screening such as a colonoscopy/FIT/Cologuard?    NO    Date of last Colonoscopy: 6/12/2018  No cologuard on file  No FIT/FOBT on file   No flexible sigmoidoscopy on file

## 2024-11-19 NOTE — PROGRESS NOTES
Jared Snow (:  1950) is a 74 y.o. male, Established patient, here for evaluation of the following chief complaint(s):  Office Visit for Anticoagulation Management         Assessment & Plan  1. Thrush.  Despite treatment, the thrush persists. He reports a grayish appearance and a rotten taste in his mouth. A referral to an ENT specialist, Dr. Rex Farah at [facility], has been made for further evaluation and management. He will continue taking pantoprazole until he sees Dr. Farah.    2. Hypertension.  His blood pressure was recorded at 136/86 mmHg during the visit. He will continue with the current antihypertensive medication regimen. Blood pressure will be rechecked at the next visit.    3. Medication Management.  He did not receive the prescription sent to [facility]. The pulmonologist provided samples and a certificate for Breztri, which was resolved after contacting the Breztri representative. He inquired about a sample of Breztri to take home.    Follow-up  Patient will return next month for follow-up.    Results    1. Warfarin anticoagulation  -     AMB POC PT/INR  2. Thrush  -     AFL - Rex Maldonado MD, Otolaryngology, [place]    No follow-ups on file.       Subjective   History of Present Illness  The patient presents for evaluation of multiple medical concerns.    He reports that the medication prescribed for his thrush has not been effective. Although he has noticed an improvement in his sense of taste, he still perceives a grayish hue to his tongue and experiences a persistent rotten taste in his mouth. He is currently taking pantoprazole for this issue. He also mentions that he does not have natural teeth.    He has reduced his medication dosage to half a pill for one day, as his blood pressure readings were on the lower side of normal. His blood pressure reading this morning was 136/86.    He did not get the Breztri prescription initially. He went to the pulmonologist who

## 2024-12-02 RX ORDER — WARFARIN SODIUM 5 MG/1
TABLET ORAL
Qty: 100 TABLET | Refills: 3 | Status: SHIPPED | OUTPATIENT
Start: 2024-12-02

## 2024-12-17 ENCOUNTER — OFFICE VISIT (OUTPATIENT)
Facility: CLINIC | Age: 74
End: 2024-12-17
Payer: MEDICARE

## 2024-12-17 VITALS
TEMPERATURE: 98.2 F | RESPIRATION RATE: 19 BRPM | HEIGHT: 68 IN | DIASTOLIC BLOOD PRESSURE: 78 MMHG | OXYGEN SATURATION: 98 % | BODY MASS INDEX: 22.13 KG/M2 | SYSTOLIC BLOOD PRESSURE: 114 MMHG | HEART RATE: 81 BPM | WEIGHT: 146 LBS

## 2024-12-17 DIAGNOSIS — Z79.01 WARFARIN ANTICOAGULATION: Primary | ICD-10-CM

## 2024-12-17 DIAGNOSIS — E03.9 ACQUIRED HYPOTHYROIDISM: ICD-10-CM

## 2024-12-17 DIAGNOSIS — E78.5 HYPERLIPIDEMIA LDL GOAL <70: ICD-10-CM

## 2024-12-17 DIAGNOSIS — I10 PRIMARY HYPERTENSION: ICD-10-CM

## 2024-12-17 LAB
ALBUMIN SERPL-MCNC: 4.3 G/DL (ref 3.5–5)
ALBUMIN/GLOB SERPL: 1.7 (ref 1.1–2.2)
ALP SERPL-CCNC: 59 U/L (ref 45–117)
ALT SERPL-CCNC: 18 U/L (ref 12–78)
ANION GAP SERPL CALC-SCNC: 8 MMOL/L (ref 2–12)
AST SERPL-CCNC: 16 U/L (ref 15–37)
BILIRUB SERPL-MCNC: 0.9 MG/DL (ref 0.2–1)
BUN SERPL-MCNC: 23 MG/DL (ref 6–20)
BUN/CREAT SERPL: 18 (ref 12–20)
CALCIUM SERPL-MCNC: 9.7 MG/DL (ref 8.5–10.1)
CHLORIDE SERPL-SCNC: 103 MMOL/L (ref 97–108)
CHOLEST SERPL-MCNC: 136 MG/DL
CO2 SERPL-SCNC: 25 MMOL/L (ref 21–32)
COMMENT:: NORMAL
CREAT SERPL-MCNC: 1.26 MG/DL (ref 0.7–1.3)
GLOBULIN SER CALC-MCNC: 2.6 G/DL (ref 2–4)
GLUCOSE SERPL-MCNC: 95 MG/DL (ref 65–100)
HDLC SERPL-MCNC: 68 MG/DL
HDLC SERPL: 2 (ref 0–5)
LDLC SERPL CALC-MCNC: 44 MG/DL (ref 0–100)
POC INR: 4.2
POTASSIUM SERPL-SCNC: 4.5 MMOL/L (ref 3.5–5.1)
PROT SERPL-MCNC: 6.9 G/DL (ref 6.4–8.2)
PROTHROMBIN TIME, POC: 51.4
SODIUM SERPL-SCNC: 136 MMOL/L (ref 136–145)
SPECIMEN HOLD: NORMAL
TRIGL SERPL-MCNC: 120 MG/DL
TSH SERPL DL<=0.05 MIU/L-ACNC: 0.32 UIU/ML (ref 0.36–3.74)
VLDLC SERPL CALC-MCNC: 24 MG/DL

## 2024-12-17 PROCEDURE — 85610 PROTHROMBIN TIME: CPT | Performed by: NURSE PRACTITIONER

## 2024-12-17 RX ORDER — ROSUVASTATIN CALCIUM 5 MG/1
5 TABLET, COATED ORAL DAILY
Qty: 90 TABLET | Refills: 3 | Status: SHIPPED | OUTPATIENT
Start: 2024-12-17

## 2024-12-17 NOTE — PROGRESS NOTES
Jraed Snow (:  1950) is a 74 y.o. male, Established patient, here for evaluation of the following chief complaint(s):  Office Visit for Anticoagulation Management (INR check) and Lab Collection         Assessment & Plan  1. Elevated International Normalized Ratio (INR).  His INR levels have been consistently elevated, with readings of 4 in 2024, 3.5 in 2024, and 4.2 today. He is advised to discontinue the practice of taking an extra half tablet of Coumadin on . He should continue with the regular dosage of one Coumadin tablet daily. A prescription for a 90-day supply of rosuvastatin has been issued. He is also advised to address the issue of receiving a 30-day supply instead of a 90-day supply of Coumadin with [facility].    2. Thyroid management.  He had a TSH test done by his endocrinologist about 2 months ago, which showed low-normal levels. A repeat TSH test will be conducted today to monitor his thyroid function. He is currently on a weekly dose of 200 mcg of thyroid medication.    Follow-up  The patient will follow up in 1 month.    Results  Laboratory Studies  INR was 4.2 today. TSH was on the low end of normal 2 months ago.  1. Warfarin anticoagulation  -     AMB POC PT/INR  2. Hyperlipidemia LDL goal <70  -     rosuvastatin (CRESTOR) 5 MG tablet; Take 1 tablet by mouth daily, Disp-90 tablet, R-3Normal  -     Lipid Panel; Future  3. Primary hypertension  -     Comprehensive Metabolic Panel; Future  4. Acquired hypothyroidism  -     TSH; Future    No follow-ups on file.       Subjective   History of Present Illness  The patient presents for evaluation of elevated INR and thyroid management.    He has been adhering to the prescribed regimen without any recent dietary changes, specifically maintaining his intake of green vegetables. His INR levels have previously reached 4.3 on two separate occasions. He does not self-adjust his medication but rather follows the medical

## 2024-12-17 NOTE — PROGRESS NOTES
Chief Complaint   Patient presents with    Office Visit for Anticoagulation Management     INR check    Lab Collection     Patient presents in the office today for a INR check.  INR: 4.2  Patient would like a 90 supply on Crestor.  Patient mentioned that he needed blood work.  No other concerns.    \"Have you been to the ER, urgent care clinic since your last visit?  Hospitalized since your last visit?\"    NO    “Have you seen or consulted any other health care providers outside our system since your last visit?”    NO    “Have you had a colorectal cancer screening such as a colonoscopy/FIT/Cologuard?    NO    Date of last Colonoscopy: 6/12/2018  No cologuard on file  No FIT/FOBT on file   No flexible sigmoidoscopy on file

## 2025-01-20 SDOH — ECONOMIC STABILITY: INCOME INSECURITY: IN THE LAST 12 MONTHS, WAS THERE A TIME WHEN YOU WERE NOT ABLE TO PAY THE MORTGAGE OR RENT ON TIME?: NO

## 2025-01-20 SDOH — ECONOMIC STABILITY: FOOD INSECURITY: WITHIN THE PAST 12 MONTHS, YOU WORRIED THAT YOUR FOOD WOULD RUN OUT BEFORE YOU GOT MONEY TO BUY MORE.: NEVER TRUE

## 2025-01-20 SDOH — ECONOMIC STABILITY: FOOD INSECURITY: WITHIN THE PAST 12 MONTHS, THE FOOD YOU BOUGHT JUST DIDN'T LAST AND YOU DIDN'T HAVE MONEY TO GET MORE.: NEVER TRUE

## 2025-01-20 SDOH — ECONOMIC STABILITY: TRANSPORTATION INSECURITY
IN THE PAST 12 MONTHS, HAS THE LACK OF TRANSPORTATION KEPT YOU FROM MEDICAL APPOINTMENTS OR FROM GETTING MEDICATIONS?: NO

## 2025-01-23 ENCOUNTER — OFFICE VISIT (OUTPATIENT)
Facility: CLINIC | Age: 75
End: 2025-01-23
Payer: MEDICARE

## 2025-01-23 VITALS
TEMPERATURE: 98.5 F | SYSTOLIC BLOOD PRESSURE: 161 MMHG | BODY MASS INDEX: 21.52 KG/M2 | DIASTOLIC BLOOD PRESSURE: 105 MMHG | RESPIRATION RATE: 19 BRPM | HEART RATE: 84 BPM | OXYGEN SATURATION: 97 % | WEIGHT: 142 LBS | HEIGHT: 68 IN

## 2025-01-23 DIAGNOSIS — F32.1 CURRENT MODERATE EPISODE OF MAJOR DEPRESSIVE DISORDER, UNSPECIFIED WHETHER RECURRENT (HCC): ICD-10-CM

## 2025-01-23 DIAGNOSIS — Z79.01 WARFARIN ANTICOAGULATION: Primary | ICD-10-CM

## 2025-01-23 LAB
POC INR: 3.6
PROTHROMBIN TIME, POC: 42.9

## 2025-01-23 PROCEDURE — 99213 OFFICE O/P EST LOW 20 MIN: CPT | Performed by: NURSE PRACTITIONER

## 2025-01-23 PROCEDURE — 85610 PROTHROMBIN TIME: CPT | Performed by: NURSE PRACTITIONER

## 2025-01-23 RX ORDER — FLUOXETINE 10 MG/1
10 CAPSULE ORAL DAILY
Qty: 30 CAPSULE | Refills: 5 | Status: SHIPPED | OUTPATIENT
Start: 2025-01-23

## 2025-01-23 ASSESSMENT — PATIENT HEALTH QUESTIONNAIRE - PHQ9
SUM OF ALL RESPONSES TO PHQ9 QUESTIONS 1 & 2: 6
10. IF YOU CHECKED OFF ANY PROBLEMS, HOW DIFFICULT HAVE THESE PROBLEMS MADE IT FOR YOU TO DO YOUR WORK, TAKE CARE OF THINGS AT HOME, OR GET ALONG WITH OTHER PEOPLE: NOT DIFFICULT AT ALL
6. FEELING BAD ABOUT YOURSELF - OR THAT YOU ARE A FAILURE OR HAVE LET YOURSELF OR YOUR FAMILY DOWN: NOT AT ALL
SUM OF ALL RESPONSES TO PHQ QUESTIONS 1-9: 13
3. TROUBLE FALLING OR STAYING ASLEEP: NEARLY EVERY DAY
SUM OF ALL RESPONSES TO PHQ QUESTIONS 1-9: 13
8. MOVING OR SPEAKING SO SLOWLY THAT OTHER PEOPLE COULD HAVE NOTICED. OR THE OPPOSITE, BEING SO FIGETY OR RESTLESS THAT YOU HAVE BEEN MOVING AROUND A LOT MORE THAN USUAL: NOT AT ALL
7. TROUBLE CONCENTRATING ON THINGS, SUCH AS READING THE NEWSPAPER OR WATCHING TELEVISION: NOT AT ALL
2. FEELING DOWN, DEPRESSED OR HOPELESS: NEARLY EVERY DAY
1. LITTLE INTEREST OR PLEASURE IN DOING THINGS: NEARLY EVERY DAY
SUM OF ALL RESPONSES TO PHQ QUESTIONS 1-9: 13
SUM OF ALL RESPONSES TO PHQ QUESTIONS 1-9: 13
4. FEELING TIRED OR HAVING LITTLE ENERGY: MORE THAN HALF THE DAYS
5. POOR APPETITE OR OVEREATING: MORE THAN HALF THE DAYS
9. THOUGHTS THAT YOU WOULD BE BETTER OFF DEAD, OR OF HURTING YOURSELF: NOT AT ALL

## 2025-01-23 NOTE — PROGRESS NOTES
Chief Complaint   Patient presents with    Office Visit for Anticoagulation Management     INR     Patient presents in the office today for a INR check.  INR: 3.6  Patient stated he feels feverish, stated that he's been checking her temp but no fever.  Patient stated he has been eating a lot, stated that he has nausea when he eats.  Patient also stated he has lost weight.   No other concerns.    \"Have you been to the ER, urgent care clinic since your last visit?  Hospitalized since your last visit?\"    NO    “Have you seen or consulted any other health care providers outside our system since your last visit?”    NO    “Have you had a colorectal cancer screening such as a colonoscopy/FIT/Cologuard?    NO    Date of last Colonoscopy: 6/12/2018  No cologuard on file  No FIT/FOBT on file   No flexible sigmoidoscopy on file

## 2025-01-23 NOTE — PROGRESS NOTES
Jared Snow (:  1950) is a 74 y.o. male, Established patient, here for evaluation of the following chief complaint(s):  Office Visit for Anticoagulation Management (INR)         Assessment & Plan  1. Depression.  His symptoms are indicative of depression, characterized by a lack of interest in activities and a decrease in appetite. He has experienced a weight loss of 8 pounds since 2024. A prescription for fluoxetine 10 mg, to be taken once daily, has been provided. He is advised to take the medication at the same time each day, preferably in the morning. The potential side effects, including gastrointestinal upset and insomnia, have been discussed. He is informed that a noticeable improvement in symptoms may be observed within 2 weeks, with a calming effect potentially noticeable within the first week. If he decides to discontinue the medication after a few weeks, he can stop it abruptly without adverse effects.    2. Decreased appetite.  His decreased appetite is likely related to his depression. He is advised to monitor his food intake and ensure he is consuming at least two meals a day. The importance of hydration has also been emphasized, and he is encouraged to increase his fluid intake.    Follow-up  The patient will follow up in 4 weeks for INR and medication recheck.    Results    1. Warfarin anticoagulation  -     AMB POC PT/INR  2. Current moderate episode of major depressive disorder, unspecified whether recurrent (HCC)    Return for follow up 4 weeks for INR and med recheck for depression - Dr KAREEN Esquivel.       Subjective   History of Present Illness  The patient presents for evaluation of depression.    He reports a persistent feeling of feverishness, which he describes as a sensation of heat in his head, without any associated chills or sweats. This symptom began in late November, showed some improvement, but has since worsened over the past 2.5 weeks. He has not recorded a fever

## 2025-02-28 ENCOUNTER — OFFICE VISIT (OUTPATIENT)
Facility: CLINIC | Age: 75
End: 2025-02-28
Payer: MEDICARE

## 2025-02-28 ENCOUNTER — TRANSCRIBE ORDERS (OUTPATIENT)
Facility: HOSPITAL | Age: 75
End: 2025-02-28

## 2025-02-28 VITALS
RESPIRATION RATE: 17 BRPM | BODY MASS INDEX: 21.67 KG/M2 | HEIGHT: 68 IN | TEMPERATURE: 98.4 F | DIASTOLIC BLOOD PRESSURE: 70 MMHG | HEART RATE: 64 BPM | WEIGHT: 143 LBS | SYSTOLIC BLOOD PRESSURE: 130 MMHG | OXYGEN SATURATION: 98 %

## 2025-02-28 DIAGNOSIS — E07.9 THYROID DISEASE: ICD-10-CM

## 2025-02-28 DIAGNOSIS — C34.11 CANCER OF UPPER LOBE OF RIGHT LUNG (HCC): Primary | ICD-10-CM

## 2025-02-28 DIAGNOSIS — L70.0 CYSTIC ACNE: ICD-10-CM

## 2025-02-28 DIAGNOSIS — C34.11 CANCER OF UPPER LOBE OF RIGHT LUNG (HCC): ICD-10-CM

## 2025-02-28 DIAGNOSIS — Z00.01 ENCOUNTER FOR GENERAL ADULT MEDICAL EXAMINATION WITH ABNORMAL FINDINGS: ICD-10-CM

## 2025-02-28 DIAGNOSIS — Z79.01 WARFARIN ANTICOAGULATION: Primary | ICD-10-CM

## 2025-02-28 DIAGNOSIS — I48.91 ATRIAL FIBRILLATION, UNSPECIFIED TYPE (HCC): ICD-10-CM

## 2025-02-28 DIAGNOSIS — J43.2 CENTRILOBULAR EMPHYSEMA (HCC): ICD-10-CM

## 2025-02-28 DIAGNOSIS — N18.2 STAGE 2 CHRONIC KIDNEY DISEASE: ICD-10-CM

## 2025-02-28 LAB
POC INR: 2.5
PROTHROMBIN TIME, POC: 30.6

## 2025-02-28 PROCEDURE — 85610 PROTHROMBIN TIME: CPT

## 2025-02-28 PROCEDURE — 99214 OFFICE O/P EST MOD 30 MIN: CPT

## 2025-02-28 RX ORDER — GENTAMICIN SULFATE 1 MG/G
OINTMENT TOPICAL 3 TIMES DAILY
Qty: 15 G | Refills: 3 | Status: SHIPPED | OUTPATIENT
Start: 2025-02-28

## 2025-02-28 SDOH — ECONOMIC STABILITY: FOOD INSECURITY: WITHIN THE PAST 12 MONTHS, YOU WORRIED THAT YOUR FOOD WOULD RUN OUT BEFORE YOU GOT MONEY TO BUY MORE.: NEVER TRUE

## 2025-02-28 SDOH — ECONOMIC STABILITY: FOOD INSECURITY: WITHIN THE PAST 12 MONTHS, THE FOOD YOU BOUGHT JUST DIDN'T LAST AND YOU DIDN'T HAVE MONEY TO GET MORE.: NEVER TRUE

## 2025-02-28 ASSESSMENT — PATIENT HEALTH QUESTIONNAIRE - PHQ9
2. FEELING DOWN, DEPRESSED OR HOPELESS: NOT AT ALL
1. LITTLE INTEREST OR PLEASURE IN DOING THINGS: SEVERAL DAYS
SUM OF ALL RESPONSES TO PHQ QUESTIONS 1-9: 5
SUM OF ALL RESPONSES TO PHQ9 QUESTIONS 1 & 2: 1
SUM OF ALL RESPONSES TO PHQ QUESTIONS 1-9: 5
4. FEELING TIRED OR HAVING LITTLE ENERGY: SEVERAL DAYS
SUM OF ALL RESPONSES TO PHQ QUESTIONS 1-9: 5
SUM OF ALL RESPONSES TO PHQ QUESTIONS 1-9: 5
3. TROUBLE FALLING OR STAYING ASLEEP: NEARLY EVERY DAY
6. FEELING BAD ABOUT YOURSELF - OR THAT YOU ARE A FAILURE OR HAVE LET YOURSELF OR YOUR FAMILY DOWN: NOT AT ALL
8. MOVING OR SPEAKING SO SLOWLY THAT OTHER PEOPLE COULD HAVE NOTICED. OR THE OPPOSITE, BEING SO FIGETY OR RESTLESS THAT YOU HAVE BEEN MOVING AROUND A LOT MORE THAN USUAL: NOT AT ALL
7. TROUBLE CONCENTRATING ON THINGS, SUCH AS READING THE NEWSPAPER OR WATCHING TELEVISION: NOT AT ALL
5. POOR APPETITE OR OVEREATING: NOT AT ALL
10. IF YOU CHECKED OFF ANY PROBLEMS, HOW DIFFICULT HAVE THESE PROBLEMS MADE IT FOR YOU TO DO YOUR WORK, TAKE CARE OF THINGS AT HOME, OR GET ALONG WITH OTHER PEOPLE: NOT DIFFICULT AT ALL
9. THOUGHTS THAT YOU WOULD BE BETTER OFF DEAD, OR OF HURTING YOURSELF: NOT AT ALL

## 2025-02-28 ASSESSMENT — ENCOUNTER SYMPTOMS
SHORTNESS OF BREATH: 0
SORE THROAT: 0
DIARRHEA: 0
RHINORRHEA: 0
EYES NEGATIVE: 1
ALLERGIC/IMMUNOLOGIC NEGATIVE: 1
SINUS PAIN: 0
COUGH: 0
ABDOMINAL PAIN: 0
CONSTIPATION: 0

## 2025-02-28 NOTE — PROGRESS NOTES
Chief Complaint   Patient presents with    Anticoagulation    Depression     Stopped RX    Skin Problem     Face flare up     Patient states consent for provider to use MARIA M System for documentation of this visit.    Results for orders placed or performed in visit on 02/28/25   AMB POC PT/INR   Result Value Ref Range    Prothrombin time, POC 30.6     POC INR 2.5        \"Have you been to the ER, urgent care clinic since your last visit?  Hospitalized since your last visit?\"    NO    “Have you seen or consulted any other health care providers outside our system since your last visit?”    NO    PHQ-9 Total Score: 5 (2/28/2025 10:53 AM)  Thoughts that you would be better off dead, or of hurting yourself in some way: 0 (2/28/2025 10:53 AM)      “Have you had a colorectal cancer screening such as a colonoscopy/FIT/Cologuard?    YES - Type: Colonoscopy - Where: [facility]: Dr Quinones Nurse/CMA to request most recent records if not in the chart     Date of last Colonoscopy: 6/12/2018  No cologuard on file  No FIT/FOBT on file   No flexible sigmoidoscopy on file

## 2025-02-28 NOTE — PROGRESS NOTES
Chief Complaint   Patient presents with    Anticoagulation    Depression     Stopped RX    Skin Problem     Face flare up     Patient states consent for provider to use MARIA M System for documentation of this visit.    \"Have you been to the ER, urgent care clinic since your last visit?  Hospitalized since your last visit?\"    NO    “Have you seen or consulted any other health care providers outside our system since your last visit?”    NO    “Have you had a colorectal cancer screening such as a colonoscopy/FIT/Cologuard?    YES - Type: Colonoscopy - Where: [facility]: Dr Quinones Nurse/CMA to request most recent records if not in the chart     Date of last Colonoscopy: 2018  No cologuard on file  No FIT/FOBT on file   No flexible sigmoidoscopy on file                Jared Snow (:  1950) is a 74 y.o. male, here for evaluation of the following chief complaint(s):  Anticoagulation, Depression (Stopped RX), and Skin Problem (Face flare up)        Subjective   History of Present Illness  The patient presents for evaluation of INR, cystic acne, depression, chronic kidney disease, hypertension, hypothyroidism, emphysema and COPD, constipation.    He reports a decrease in his INR from 3.6 to 2.5. He has been on a regimen of Coumadin 5 mg daily for the past week, which appears to be effective.    He experiences occasional facial flare-ups, characterized by boils or cystic acne. He has been using gentamicin ointment, prescribed by a dermatologist, as a spot treatment with good results.    He discontinued Prozac due to personal discomfort with antidepressants. He recalls a difficult period in 2025, marked by cold weather, poor health, and significant home repairs, which led to the initiation of Prozac therapy. However, he did not like the way he was feeling with it, so he just stopped.    He monitors his blood pressure daily at home, which typically reads in the 130s systolic and 70s diastolic, rarely reaching

## 2025-03-01 LAB
ALBUMIN SERPL-MCNC: 4.4 G/DL (ref 3.5–5)
ALBUMIN/GLOB SERPL: 1.7 (ref 1.1–2.2)
ALP SERPL-CCNC: 53 U/L (ref 45–117)
ALT SERPL-CCNC: 21 U/L (ref 12–78)
ANION GAP SERPL CALC-SCNC: 9 MMOL/L (ref 2–12)
AST SERPL-CCNC: 21 U/L (ref 15–37)
BILIRUB SERPL-MCNC: 0.7 MG/DL (ref 0.2–1)
BUN SERPL-MCNC: 19 MG/DL (ref 6–20)
BUN/CREAT SERPL: 16 (ref 12–20)
CALCIUM SERPL-MCNC: 9.8 MG/DL (ref 8.5–10.1)
CHLORIDE SERPL-SCNC: 100 MMOL/L (ref 97–108)
CO2 SERPL-SCNC: 28 MMOL/L (ref 21–32)
CREAT SERPL-MCNC: 1.22 MG/DL (ref 0.7–1.3)
GLOBULIN SER CALC-MCNC: 2.6 G/DL (ref 2–4)
GLUCOSE SERPL-MCNC: 88 MG/DL (ref 65–100)
POTASSIUM SERPL-SCNC: 4.9 MMOL/L (ref 3.5–5.1)
PROT SERPL-MCNC: 7 G/DL (ref 6.4–8.2)
SODIUM SERPL-SCNC: 137 MMOL/L (ref 136–145)
TSH SERPL DL<=0.05 MIU/L-ACNC: 0.87 UIU/ML (ref 0.36–3.74)

## 2025-03-01 NOTE — RESULT ENCOUNTER NOTE
Notify patient via [email or web address] message      Your TSH which screens for thyroid disease came back normal. This means that your current dose of Unithroid (200 6 days 100 1 day mcg) is appropriate for your body.  We should recheck this at least once a year, sooner if any symptoms of abnormal thyroid level develop.

## 2025-03-01 NOTE — RESULT ENCOUNTER NOTE
Notify patient via [employer] message      Your metabolic panel which looks at your blood sugar, electrolytes, liver function, and kidney function looks good.

## 2025-03-20 ENCOUNTER — OFFICE VISIT (OUTPATIENT)
Facility: CLINIC | Age: 75
End: 2025-03-20
Payer: MEDICARE

## 2025-03-20 VITALS
OXYGEN SATURATION: 100 % | WEIGHT: 144 LBS | HEIGHT: 68 IN | SYSTOLIC BLOOD PRESSURE: 124 MMHG | HEART RATE: 66 BPM | BODY MASS INDEX: 21.82 KG/M2 | TEMPERATURE: 97.3 F | RESPIRATION RATE: 16 BRPM | DIASTOLIC BLOOD PRESSURE: 78 MMHG

## 2025-03-20 DIAGNOSIS — Z00.00 ENCOUNTER FOR GENERAL ADULT MEDICAL EXAMINATION W/O ABNORMAL FINDINGS: ICD-10-CM

## 2025-03-20 DIAGNOSIS — Z79.01 WARFARIN ANTICOAGULATION: Primary | ICD-10-CM

## 2025-03-20 DIAGNOSIS — I10 PRIMARY HYPERTENSION: ICD-10-CM

## 2025-03-20 DIAGNOSIS — I48.91 ATRIAL FIBRILLATION, UNSPECIFIED TYPE (HCC): ICD-10-CM

## 2025-03-20 LAB
POC INR: 2.1
PROTHROMBIN TIME, POC: 25.5

## 2025-03-20 PROCEDURE — 85610 PROTHROMBIN TIME: CPT

## 2025-03-20 PROCEDURE — 1036F TOBACCO NON-USER: CPT

## 2025-03-20 PROCEDURE — 1160F RVW MEDS BY RX/DR IN RCRD: CPT

## 2025-03-20 PROCEDURE — 1126F AMNT PAIN NOTED NONE PRSNT: CPT

## 2025-03-20 PROCEDURE — G8420 CALC BMI NORM PARAMETERS: HCPCS

## 2025-03-20 PROCEDURE — 99213 OFFICE O/P EST LOW 20 MIN: CPT

## 2025-03-20 PROCEDURE — 1123F ACP DISCUSS/DSCN MKR DOCD: CPT

## 2025-03-20 PROCEDURE — 1159F MED LIST DOCD IN RCRD: CPT

## 2025-03-20 PROCEDURE — PBSHW AMB POC PT/INR

## 2025-03-20 PROCEDURE — G8427 DOCREV CUR MEDS BY ELIG CLIN: HCPCS

## 2025-03-20 PROCEDURE — 3074F SYST BP LT 130 MM HG: CPT

## 2025-03-20 PROCEDURE — 3017F COLORECTAL CA SCREEN DOC REV: CPT

## 2025-03-20 PROCEDURE — 3078F DIAST BP <80 MM HG: CPT

## 2025-03-20 ASSESSMENT — ENCOUNTER SYMPTOMS
ABDOMINAL PAIN: 0
SORE THROAT: 0
CONSTIPATION: 0
SINUS PAIN: 0
ALLERGIC/IMMUNOLOGIC NEGATIVE: 1
RHINORRHEA: 0
EYES NEGATIVE: 1
COUGH: 0
SHORTNESS OF BREATH: 0
DIARRHEA: 0

## 2025-03-20 NOTE — PROGRESS NOTES
Chief Complaint   Patient presents with    Anticoagulation     A-Fib     Patient states consent for provider to use MARIA M System for documentation of this visit.    \"Have you been to the ER, urgent care clinic since your last visit?  Hospitalized since your last visit?\"    NO    “Have you seen or consulted any other health care providers outside our system since your last visit?”    NO    Results for orders placed or performed in visit on 03/20/25   AMB POC PT/INR   Result Value Ref Range    Prothrombin time, POC 25.5     POC INR 2.1        “Have you had a colorectal cancer screening such as a colonoscopy/FIT/Cologuard?    YES - Type: Colonoscopy - Where: Dr Quinones Nurse/[facility] to request most recent records if not in the chart     Date of last Colonoscopy: 6/12/2018  No cologuard on file  No FIT/FOBT on file   No flexible sigmoidoscopy on file            
consented to the use of AI, including the recording.           I have discussed the diagnosis with the patient and the intended plan as seen in the above orders.  The patient understands and agrees with the plan. The patient has received an after-visit summary and questions were answered concerning future plans.     Medication Side Effects and Warnings were discussed with patient  Patient Labs were reviewed and or requested:  Patient Past Records were reviewed and or requested    We discussed the expected course, resolution and complications of the diagnosis(es) in detail.  Medication risks, benefits, costs, interactions, and alternatives were discussed as indicated.  I advised him to contact the office if his condition worsens, changes or fails to improve as anticipated. He expressed understanding with the diagnosis(es) and plan.     Please note that this dictation was completed with [employer], the computer voice recognition software and MARIA M Artifical intellience software.  Quite often unanticipated grammatical, syntax, homophones, and other interpretive errors are inadvertently transcribed by the computer software.  Please disregard these errors.  Please excuse any errors that have escaped final proofreading.  Thank you.     The patient (or guardian, if applicable) and other individuals in attendance with the patient were advised that Artificial Intelligence will be utilized during this visit to record, process the conversation to generate a clinical note, and support improvement of the AI technology. The patient (or guardian, if applicable) and other individuals in attendance at the appointment consented to the use of AI, including the recording.      WILI Madera.    There are no Patient Instructions on file for this visit.

## 2025-04-08 DIAGNOSIS — K21.9 GASTROESOPHAGEAL REFLUX DISEASE WITHOUT ESOPHAGITIS: ICD-10-CM

## 2025-04-09 RX ORDER — PANTOPRAZOLE SODIUM 40 MG/1
40 TABLET, DELAYED RELEASE ORAL
Qty: 30 TABLET | Refills: 5 | Status: SHIPPED | OUTPATIENT
Start: 2025-04-09

## 2025-04-10 ENCOUNTER — HOSPITAL ENCOUNTER (OUTPATIENT)
Facility: HOSPITAL | Age: 75
Discharge: HOME | End: 2025-04-13
Attending: INTERNAL MEDICINE
Payer: MEDICARE

## 2025-04-10 DIAGNOSIS — C34.11 CANCER OF UPPER LOBE OF RIGHT LUNG (HCC): ICD-10-CM

## 2025-04-10 PROCEDURE — 71250 CT THORAX DX C-: CPT

## 2025-04-24 ENCOUNTER — OFFICE VISIT (OUTPATIENT)
Facility: CLINIC | Age: 75
End: 2025-04-24
Payer: MEDICARE

## 2025-04-24 VITALS
WEIGHT: 143 LBS | TEMPERATURE: 97.4 F | SYSTOLIC BLOOD PRESSURE: 161 MMHG | DIASTOLIC BLOOD PRESSURE: 90 MMHG | HEIGHT: 68 IN | RESPIRATION RATE: 17 BRPM | BODY MASS INDEX: 21.67 KG/M2 | HEART RATE: 66 BPM | OXYGEN SATURATION: 99 %

## 2025-04-24 DIAGNOSIS — K21.9 GASTROESOPHAGEAL REFLUX DISEASE WITHOUT ESOPHAGITIS: ICD-10-CM

## 2025-04-24 DIAGNOSIS — Z79.01 WARFARIN ANTICOAGULATION: Primary | ICD-10-CM

## 2025-04-24 DIAGNOSIS — J43.9 PULMONARY EMPHYSEMA, UNSPECIFIED EMPHYSEMA TYPE (HCC): ICD-10-CM

## 2025-04-24 LAB
POC INR: 2.1
PROTHROMBIN TIME, POC: 25.3

## 2025-04-24 PROCEDURE — PBSHW AMB POC PT/INR

## 2025-04-24 PROCEDURE — 3017F COLORECTAL CA SCREEN DOC REV: CPT

## 2025-04-24 PROCEDURE — 99213 OFFICE O/P EST LOW 20 MIN: CPT

## 2025-04-24 PROCEDURE — 3023F SPIROM DOC REV: CPT

## 2025-04-24 PROCEDURE — 1160F RVW MEDS BY RX/DR IN RCRD: CPT

## 2025-04-24 PROCEDURE — 1159F MED LIST DOCD IN RCRD: CPT

## 2025-04-24 PROCEDURE — 1123F ACP DISCUSS/DSCN MKR DOCD: CPT

## 2025-04-24 PROCEDURE — G8427 DOCREV CUR MEDS BY ELIG CLIN: HCPCS

## 2025-04-24 PROCEDURE — 3080F DIAST BP >= 90 MM HG: CPT

## 2025-04-24 PROCEDURE — 3077F SYST BP >= 140 MM HG: CPT

## 2025-04-24 PROCEDURE — 85610 PROTHROMBIN TIME: CPT

## 2025-04-24 PROCEDURE — G8420 CALC BMI NORM PARAMETERS: HCPCS

## 2025-04-24 PROCEDURE — 1126F AMNT PAIN NOTED NONE PRSNT: CPT

## 2025-04-24 PROCEDURE — 1036F TOBACCO NON-USER: CPT

## 2025-04-24 ASSESSMENT — ENCOUNTER SYMPTOMS
SORE THROAT: 0
SHORTNESS OF BREATH: 0
EYES NEGATIVE: 1
DIARRHEA: 0
ABDOMINAL PAIN: 0
RHINORRHEA: 0
ALLERGIC/IMMUNOLOGIC NEGATIVE: 1
COUGH: 0
SINUS PAIN: 0
CONSTIPATION: 0

## 2025-04-24 NOTE — PROGRESS NOTES
Chief Complaint   Patient presents with    Anticoagulation    imaging     Review CT Scan     Patient states consent for provider to use MARIA M System for documentation of this visit.    Have you been to the ER, urgent care clinic since your last visit?  Hospitalized since your last visit?   NO    Have you seen or consulted any other health care providers outside our system since your last visit?   NO    Results for orders placed or performed in visit on 04/24/25   AMB POC PT/INR   Result Value Ref Range    Prothrombin time, POC 25.3     POC INR 2.1        “Have you had a colorectal cancer screening such as a colonoscopy/FIT/Cologuard?    NO    Date of last Colonoscopy: 6/12/2018  No cologuard on file  No FIT/FOBT on file   No flexible sigmoidoscopy on file            
pain and sore throat.    Eyes: Negative.    Respiratory:  Negative for cough and shortness of breath.    Cardiovascular:  Negative for chest pain.   Gastrointestinal:  Negative for abdominal pain, constipation and diarrhea.   Endocrine: Negative.    Musculoskeletal:  Negative for arthralgias, joint swelling and myalgias.   Skin: Negative.    Allergic/Immunologic: Negative.    Neurological:  Negative for dizziness, syncope, light-headedness and headaches.   Hematological: Negative.    Psychiatric/Behavioral: Negative.            Objective   Blood pressure (!) 161/90, pulse 66, temperature 97.4 °F (36.3 °C), temperature source Oral, resp. rate 17, height 1.727 m (5' 8\"), weight 64.9 kg (143 lb), SpO2 99%.     Physical Exam  Constitutional:       General: He is not in acute distress.     Appearance: Normal appearance. He is not ill-appearing or toxic-appearing.   HENT:      Head: Normocephalic.   Eyes:      Conjunctiva/sclera: Conjunctivae normal.   Cardiovascular:      Rate and Rhythm: Normal rate and regular rhythm.      Heart sounds: No murmur heard.  Pulmonary:      Effort: Pulmonary effort is normal. No respiratory distress.      Breath sounds: Normal breath sounds.   Abdominal:      General: There is no distension.   Neurological:      General: No focal deficit present.      Mental Status: He is alert and oriented to person, place, and time.   Psychiatric:         Mood and Affect: Mood normal.         Behavior: Behavior normal.         Thought Content: Thought content normal.         Judgment: Judgment normal.            Assessment & Plan  1. Chronic obstructive pulmonary disease.  - Reports worsening symptoms, particularly experiencing difficulty breathing in the early morning and at night.  - Recent CT scan indicated a progression of the disease.  - Discussed findings and potential treatment adjustments with a pulmonologist appointment scheduled for the second week of May.  - Advised to monitor symptoms and

## 2025-05-13 ENCOUNTER — HOSPITAL ENCOUNTER (OUTPATIENT)
Facility: HOSPITAL | Age: 75
Discharge: HOME | End: 2025-05-16

## 2025-05-13 VITALS
SYSTOLIC BLOOD PRESSURE: 144 MMHG | HEIGHT: 68 IN | RESPIRATION RATE: 16 BRPM | OXYGEN SATURATION: 97 % | HEART RATE: 59 BPM | DIASTOLIC BLOOD PRESSURE: 93 MMHG | WEIGHT: 146 LBS | BODY MASS INDEX: 22.13 KG/M2

## 2025-05-13 DIAGNOSIS — C61 PROSTATE CANCER (HCC): Primary | ICD-10-CM

## 2025-05-13 ASSESSMENT — PAIN SCALES - GENERAL: PAINLEVEL_OUTOF10: 0

## 2025-05-13 NOTE — PROGRESS NOTES
[facility]  [facility]      RADIATION ONCOLOGY FOLLOW-UP VISIT NOTE     Encounter Date: 05/13/25   Patient Name: Jared Snow  YOB: 1950  Medical Record Number: [id number]  Referring Physician: Mario Olivera MD  [id number]  [address]  [place] [address]  Primary Care Provider: Alvin Hunter FNP      DIAGNOSIS:       ICD-10-CM    1. Prostate cancer (HCC)  C61         STAGING:    Cancer Staging   Prostate cancer ([facility])  Staging form: Prostate, AJCC 8th Edition  - Clinical stage from 5/5/2022: Stage IIC (cT1c, cN0, cM0, PSA: 5.9, Grade Group: 3) - Signed by Mario Olivera MD on 8/30/2023  AJCC Staging has been reviewed      ASSESSMENT:   73 yo M with unfavorable intermediate risk prostate cancer, cT1c, iPSA 5.94, Ngozi 4+3=7 in 6 cores (7/13 total cores involved) treated with definitive IMRT (6000cGy, completed 11/4/22) + 4 months ADT (final depot 12/15/22).       PROSTATE CANCER:  Ngozi 4+3=7 disease, iPSA 5.94 diagnosed with TRUS on 3/31/22.  Had pelvic MRI on 2/13/22 showing a PIRADS-5 lesion in the left mid gland with possible microscopic EPE.  Attempted RALP on 8/16/22 was aborted due to patient PEA arrest during induction of anesthesia.  Thus treated with definitive hypofractionated IMRT (6000cGy in 20fx) + 4 months ADT.  IMRT completed 11/4/22 and final ADT depot was given 12/15/22 (7/5mg lupron).  PSA from 12/2022 was 1.01 and then 0.634 before increasing to 1.25 and likely a benign bounce as currently trending back down and currently at 0.768 and thus DOLORES.  No additional ADT/treatment needed. Continue PSA surveillance q6 months.   SYMPTOMS: stable to pre-treatment baseline.  No intervention needed.  GI SYMPTOMS: stable with chronic, occasional urgency, loose bowels, and fecal incontinence. Benign colonoscopy in 2024.  Continue to monitor.  SEXUAL HEALTH: low desire, despite T recovery.  Likely has not recovered

## 2025-05-20 SDOH — ECONOMIC STABILITY: FOOD INSECURITY: WITHIN THE PAST 12 MONTHS, THE FOOD YOU BOUGHT JUST DIDN'T LAST AND YOU DIDN'T HAVE MONEY TO GET MORE.: NEVER TRUE

## 2025-05-20 SDOH — ECONOMIC STABILITY: FOOD INSECURITY: WITHIN THE PAST 12 MONTHS, YOU WORRIED THAT YOUR FOOD WOULD RUN OUT BEFORE YOU GOT MONEY TO BUY MORE.: NEVER TRUE

## 2025-05-20 SDOH — ECONOMIC STABILITY: INCOME INSECURITY: IN THE LAST 12 MONTHS, WAS THERE A TIME WHEN YOU WERE NOT ABLE TO PAY THE MORTGAGE OR RENT ON TIME?: NO

## 2025-05-20 ASSESSMENT — PATIENT HEALTH QUESTIONNAIRE - PHQ9
6. FEELING BAD ABOUT YOURSELF - OR THAT YOU ARE A FAILURE OR HAVE LET YOURSELF OR YOUR FAMILY DOWN: NOT AT ALL
SUM OF ALL RESPONSES TO PHQ QUESTIONS 1-9: 4
9. THOUGHTS THAT YOU WOULD BE BETTER OFF DEAD, OR OF HURTING YOURSELF: NOT AT ALL
3. TROUBLE FALLING OR STAYING ASLEEP: NEARLY EVERY DAY
10. IF YOU CHECKED OFF ANY PROBLEMS, HOW DIFFICULT HAVE THESE PROBLEMS MADE IT FOR YOU TO DO YOUR WORK, TAKE CARE OF THINGS AT HOME, OR GET ALONG WITH OTHER PEOPLE: NOT DIFFICULT AT ALL
7. TROUBLE CONCENTRATING ON THINGS, SUCH AS READING THE NEWSPAPER OR WATCHING TELEVISION: NOT AT ALL
2. FEELING DOWN, DEPRESSED OR HOPELESS: NOT AT ALL
4. FEELING TIRED OR HAVING LITTLE ENERGY: SEVERAL DAYS
9. THOUGHTS THAT YOU WOULD BE BETTER OFF DEAD, OR OF HURTING YOURSELF: NOT AT ALL
4. FEELING TIRED OR HAVING LITTLE ENERGY: SEVERAL DAYS
2. FEELING DOWN, DEPRESSED OR HOPELESS: NOT AT ALL
6. FEELING BAD ABOUT YOURSELF - OR THAT YOU ARE A FAILURE OR HAVE LET YOURSELF OR YOUR FAMILY DOWN: NOT AT ALL
SUM OF ALL RESPONSES TO PHQ QUESTIONS 1-9: 4
8. MOVING OR SPEAKING SO SLOWLY THAT OTHER PEOPLE COULD HAVE NOTICED. OR THE OPPOSITE, BEING SO FIGETY OR RESTLESS THAT YOU HAVE BEEN MOVING AROUND A LOT MORE THAN USUAL: NOT AT ALL
1. LITTLE INTEREST OR PLEASURE IN DOING THINGS: NOT AT ALL
5. POOR APPETITE OR OVEREATING: NOT AT ALL
SUM OF ALL RESPONSES TO PHQ QUESTIONS 1-9: 4
10. IF YOU CHECKED OFF ANY PROBLEMS, HOW DIFFICULT HAVE THESE PROBLEMS MADE IT FOR YOU TO DO YOUR WORK, TAKE CARE OF THINGS AT HOME, OR GET ALONG WITH OTHER PEOPLE: NOT DIFFICULT AT ALL
5. POOR APPETITE OR OVEREATING: NOT AT ALL
8. MOVING OR SPEAKING SO SLOWLY THAT OTHER PEOPLE COULD HAVE NOTICED. OR THE OPPOSITE - BEING SO FIDGETY OR RESTLESS THAT YOU HAVE BEEN MOVING AROUND A LOT MORE THAN USUAL: NOT AT ALL
1. LITTLE INTEREST OR PLEASURE IN DOING THINGS: NOT AT ALL
7. TROUBLE CONCENTRATING ON THINGS, SUCH AS READING THE NEWSPAPER OR WATCHING TELEVISION: NOT AT ALL
3. TROUBLE FALLING OR STAYING ASLEEP: NEARLY EVERY DAY

## 2025-05-20 ASSESSMENT — ANXIETY QUESTIONNAIRES
3. WORRYING TOO MUCH ABOUT DIFFERENT THINGS: SEVERAL DAYS
5. BEING SO RESTLESS THAT IT IS HARD TO SIT STILL: NOT AT ALL
1. FEELING NERVOUS, ANXIOUS, OR ON EDGE: SEVERAL DAYS
7. FEELING AFRAID AS IF SOMETHING AWFUL MIGHT HAPPEN: NOT AT ALL
GAD7 TOTAL SCORE: 3
4. TROUBLE RELAXING: NOT AT ALL
2. NOT BEING ABLE TO STOP OR CONTROL WORRYING: NOT AT ALL
6. BECOMING EASILY ANNOYED OR IRRITABLE: SEVERAL DAYS
2. NOT BEING ABLE TO STOP OR CONTROL WORRYING: NOT AT ALL
5. BEING SO RESTLESS THAT IT IS HARD TO SIT STILL: NOT AT ALL
4. TROUBLE RELAXING: NOT AT ALL
3. WORRYING TOO MUCH ABOUT DIFFERENT THINGS: SEVERAL DAYS
7. FEELING AFRAID AS IF SOMETHING AWFUL MIGHT HAPPEN: NOT AT ALL
1. FEELING NERVOUS, ANXIOUS, OR ON EDGE: SEVERAL DAYS
IF YOU CHECKED OFF ANY PROBLEMS ON THIS QUESTIONNAIRE, HOW DIFFICULT HAVE THESE PROBLEMS MADE IT FOR YOU TO DO YOUR WORK, TAKE CARE OF THINGS AT HOME, OR GET ALONG WITH OTHER PEOPLE: NOT DIFFICULT AT ALL
IF YOU CHECKED OFF ANY PROBLEMS ON THIS QUESTIONNAIRE, HOW DIFFICULT HAVE THESE PROBLEMS MADE IT FOR YOU TO DO YOUR WORK, TAKE CARE OF THINGS AT HOME, OR GET ALONG WITH OTHER PEOPLE: NOT DIFFICULT AT ALL
6. BECOMING EASILY ANNOYED OR IRRITABLE: SEVERAL DAYS

## 2025-05-21 ENCOUNTER — OFFICE VISIT (OUTPATIENT)
Facility: CLINIC | Age: 75
End: 2025-05-21
Payer: MEDICARE

## 2025-05-21 VITALS
DIASTOLIC BLOOD PRESSURE: 84 MMHG | HEART RATE: 63 BPM | WEIGHT: 144.6 LBS | BODY MASS INDEX: 21.92 KG/M2 | SYSTOLIC BLOOD PRESSURE: 130 MMHG | HEIGHT: 68 IN | TEMPERATURE: 97.5 F | OXYGEN SATURATION: 100 %

## 2025-05-21 DIAGNOSIS — Z79.01 WARFARIN ANTICOAGULATION: Primary | ICD-10-CM

## 2025-05-21 DIAGNOSIS — I48.91 ATRIAL FIBRILLATION, UNSPECIFIED TYPE (HCC): ICD-10-CM

## 2025-05-21 LAB
POC INR: 2.2
PROTHROMBIN TIME, POC: NORMAL

## 2025-05-21 PROCEDURE — 1159F MED LIST DOCD IN RCRD: CPT | Performed by: NURSE PRACTITIONER

## 2025-05-21 PROCEDURE — 1123F ACP DISCUSS/DSCN MKR DOCD: CPT | Performed by: NURSE PRACTITIONER

## 2025-05-21 PROCEDURE — G8427 DOCREV CUR MEDS BY ELIG CLIN: HCPCS | Performed by: NURSE PRACTITIONER

## 2025-05-21 PROCEDURE — G8420 CALC BMI NORM PARAMETERS: HCPCS | Performed by: NURSE PRACTITIONER

## 2025-05-21 PROCEDURE — 1036F TOBACCO NON-USER: CPT | Performed by: NURSE PRACTITIONER

## 2025-05-21 PROCEDURE — 3079F DIAST BP 80-89 MM HG: CPT | Performed by: NURSE PRACTITIONER

## 2025-05-21 PROCEDURE — 1125F AMNT PAIN NOTED PAIN PRSNT: CPT | Performed by: NURSE PRACTITIONER

## 2025-05-21 PROCEDURE — 3017F COLORECTAL CA SCREEN DOC REV: CPT | Performed by: NURSE PRACTITIONER

## 2025-05-21 PROCEDURE — 99214 OFFICE O/P EST MOD 30 MIN: CPT | Performed by: NURSE PRACTITIONER

## 2025-05-21 PROCEDURE — 1160F RVW MEDS BY RX/DR IN RCRD: CPT | Performed by: NURSE PRACTITIONER

## 2025-05-21 PROCEDURE — 85610 PROTHROMBIN TIME: CPT | Performed by: NURSE PRACTITIONER

## 2025-05-21 PROCEDURE — 3075F SYST BP GE 130 - 139MM HG: CPT | Performed by: NURSE PRACTITIONER

## 2025-05-21 PROCEDURE — PBSHW AMB POC PT/INR: Performed by: NURSE PRACTITIONER

## 2025-05-21 NOTE — PROGRESS NOTES
Progress Note        ·Reports that periodically he has been  spitting up spots of blood in his mucus. Most times it is just a few speck of blood, a few times it has been a clot.   It's only when he coughs and doesn't occur every time that he coughs  Sees pulmonology twice yearly and was seen recently but wasn't having this sx at the time of his appointment  ºStates that he has a hx of lung cancer.   ºHas not noted any sx's of illness or masses.  ºReports that sx's started aboout 2 weeks ago                  Subjective:     Patient's medications, allergies, past medical, surgical, social and family histories were reviewed and updated as appropriate.    Review of Systems   All other systems reviewed and are negative.       Objective:     Vitals:    05/21/25 1006 05/21/25 1041   BP: (!) 171/97 130/84   Pulse: 63    Temp: 97.5 °F (36.4 °C)    SpO2: 100%    Weight: 65.6 kg (144 lb 9.6 oz)    Height: 1.727 m (5' 8\")        Physical Exam  Constitutional:       General: He is not in acute distress.     Appearance: Normal appearance. He is obese. He is not ill-appearing.   HENT:      Head: Normocephalic and atraumatic.      Nose: Nose normal.   Eyes:      Conjunctiva/sclera: Conjunctivae normal.   Cardiovascular:      Rate and Rhythm: Normal rate and regular rhythm.      Pulses: Normal pulses.      Heart sounds: Normal heart sounds.   Pulmonary:      Effort: Pulmonary effort is normal.      Breath sounds: Normal breath sounds.   Musculoskeletal:         General: Normal range of motion.      Cervical back: Normal range of motion and neck supple.   Skin:     General: Skin is warm and dry.   Neurological:      General: No focal deficit present.      Mental Status: He is alert and oriented to person, place, and time.   Psychiatric:         Mood and Affect: Mood normal.         Behavior: Behavior normal.         Assessment/ Plan:     1. Warfarin anticoagulation  -     AMB POC PT/INR  2. Atrial fibrillation, unspecified type

## 2025-05-21 NOTE — PROGRESS NOTES
Jared Snow is a 74 y.o. male , id x 2(name and ). Reviewed record, history, and  medications.    Chief Complaint   Patient presents with    Coagulation Disorder     Reports that periodically he has been  spitting up spots of blood in his mucus. Most times it is just a few speck of blood, a few times it has been a clot.   States that he has a hx of lung cancer.   Has not noted any sx's of illness or masses.  Reports that sx's started aboout 2 weeks ago   States that he took his pulse this am and it was low for him, low 50's.     Vitals:    25 1006   BP: (!) 171/97   Pulse: 63   Temp: 97.5 °F (36.4 °C)   SpO2: 100%   Weight: 65.6 kg (144 lb 9.6 oz)   Height: 1.727 m (5' 8\")       Non-Fasting    Med Review  Reviewed: Medications reviewed no changes.  Compliance: compliant with all meds  List provided: ptmedlist: no    Results for orders placed or performed in visit on 25   AMB POC PT/INR   Result Value Ref Range    Prothrombin time, POC      POC INR 2.2           \"Have you been to the ER, urgent care clinic since your last visit?  Hospitalized since your last visit?\"    NO    “Have you seen or consulted any other health care providers outside of [facility] since your last visit?”    NO      “Have you had a colorectal cancer screening such as a colonoscopy/FIT/Cologuard?    YES - Type: Colonoscopy - Where: [facility] Nurse/CMA to request most recent records if not in the chart     Date of last Colonoscopy: 2018  No cologuard on file  No FIT/FOBT on file   No flexible sigmoidoscopy on file         2024     9:04 AM   Amb Fall Risk Assessment and TUG Test   Do you feel unsteady or are you worried about falling?  no   2 or more falls in past year? no   Fall with injury in past year? no         2025     1:56 PM   PHQ-9    Little interest or pleasure in doing things 0   Feeling down, depressed, or hopeless 0   Trouble falling or staying asleep, or sleeping too much 3   Feeling

## 2025-06-23 ASSESSMENT — LIFESTYLE VARIABLES
HOW OFTEN DURING THE LAST YEAR HAVE YOU NEEDED AN ALCOHOLIC DRINK FIRST THING IN THE MORNING TO GET YOURSELF GOING AFTER A NIGHT OF HEAVY DRINKING: NEVER
HAVE YOU OR SOMEONE ELSE BEEN INJURED AS A RESULT OF YOUR DRINKING: NO
HOW OFTEN DURING THE LAST YEAR HAVE YOU HAD A FEELING OF GUILT OR REMORSE AFTER DRINKING: NEVER
HOW OFTEN DURING THE LAST YEAR HAVE YOU FOUND THAT YOU WERE NOT ABLE TO STOP DRINKING ONCE YOU HAD STARTED: LESS THAN MONTHLY
HAS A RELATIVE, FRIEND, DOCTOR, OR ANOTHER HEALTH PROFESSIONAL EXPRESSED CONCERN ABOUT YOUR DRINKING OR SUGGESTED YOU CUT DOWN: YES, DURING THE PAST YEAR
HOW OFTEN DURING THE LAST YEAR HAVE YOU BEEN UNABLE TO REMEMBER WHAT HAPPENED THE NIGHT BEFORE BECAUSE YOU HAD BEEN DRINKING: NEVER
HOW OFTEN DURING THE LAST YEAR HAVE YOU FAILED TO DO WHAT WAS NORMALLY EXPECTED FROM YOU BECAUSE OF DRINKING: NEVER
HOW OFTEN DURING THE LAST YEAR HAVE YOU FOUND THAT YOU WERE NOT ABLE TO STOP DRINKING ONCE YOU HAD STARTED: LESS THAN MONTHLY
HOW OFTEN DURING THE LAST YEAR HAVE YOU FAILED TO DO WHAT WAS NORMALLY EXPECTED FROM YOU BECAUSE OF DRINKING: NEVER
HAS A RELATIVE, FRIEND, DOCTOR, OR ANOTHER HEALTH PROFESSIONAL EXPRESSED CONCERN ABOUT YOUR DRINKING OR SUGGESTED YOU CUT DOWN: YES, DURING THE PAST YEAR
HOW OFTEN DURING THE LAST YEAR HAVE YOU BEEN UNABLE TO REMEMBER WHAT HAPPENED THE NIGHT BEFORE BECAUSE YOU HAD BEEN DRINKING: NEVER
HAVE YOU OR SOMEONE ELSE BEEN INJURED AS A RESULT OF YOUR DRINKING: NO
HOW OFTEN DURING THE LAST YEAR HAVE YOU HAD A FEELING OF GUILT OR REMORSE AFTER DRINKING: NEVER
HOW OFTEN DURING THE LAST YEAR HAVE YOU NEEDED AN ALCOHOLIC DRINK FIRST THING IN THE MORNING TO GET YOURSELF GOING AFTER A NIGHT OF HEAVY DRINKING: NEVER

## 2025-06-23 ASSESSMENT — ANXIETY QUESTIONNAIRES
1. FEELING NERVOUS, ANXIOUS, OR ON EDGE: SEVERAL DAYS
IF YOU CHECKED OFF ANY PROBLEMS ON THIS QUESTIONNAIRE, HOW DIFFICULT HAVE THESE PROBLEMS MADE IT FOR YOU TO DO YOUR WORK, TAKE CARE OF THINGS AT HOME, OR GET ALONG WITH OTHER PEOPLE: NOT DIFFICULT AT ALL
6. BECOMING EASILY ANNOYED OR IRRITABLE: NOT AT ALL
7. FEELING AFRAID AS IF SOMETHING AWFUL MIGHT HAPPEN: NOT AT ALL
5. BEING SO RESTLESS THAT IT IS HARD TO SIT STILL: SEVERAL DAYS
GAD7 TOTAL SCORE: 5
2. NOT BEING ABLE TO STOP OR CONTROL WORRYING: SEVERAL DAYS
3. WORRYING TOO MUCH ABOUT DIFFERENT THINGS: SEVERAL DAYS
1. FEELING NERVOUS, ANXIOUS, OR ON EDGE: SEVERAL DAYS
3. WORRYING TOO MUCH ABOUT DIFFERENT THINGS: SEVERAL DAYS
4. TROUBLE RELAXING: SEVERAL DAYS
IF YOU CHECKED OFF ANY PROBLEMS ON THIS QUESTIONNAIRE, HOW DIFFICULT HAVE THESE PROBLEMS MADE IT FOR YOU TO DO YOUR WORK, TAKE CARE OF THINGS AT HOME, OR GET ALONG WITH OTHER PEOPLE: NOT DIFFICULT AT ALL
2. NOT BEING ABLE TO STOP OR CONTROL WORRYING: SEVERAL DAYS
6. BECOMING EASILY ANNOYED OR IRRITABLE: NOT AT ALL
5. BEING SO RESTLESS THAT IT IS HARD TO SIT STILL: SEVERAL DAYS
7. FEELING AFRAID AS IF SOMETHING AWFUL MIGHT HAPPEN: NOT AT ALL
4. TROUBLE RELAXING: SEVERAL DAYS

## 2025-06-23 ASSESSMENT — PATIENT HEALTH QUESTIONNAIRE - PHQ9
10. IF YOU CHECKED OFF ANY PROBLEMS, HOW DIFFICULT HAVE THESE PROBLEMS MADE IT FOR YOU TO DO YOUR WORK, TAKE CARE OF THINGS AT HOME, OR GET ALONG WITH OTHER PEOPLE: NOT DIFFICULT AT ALL
6. FEELING BAD ABOUT YOURSELF - OR THAT YOU ARE A FAILURE OR HAVE LET YOURSELF OR YOUR FAMILY DOWN: NOT AT ALL
3. TROUBLE FALLING OR STAYING ASLEEP: NOT AT ALL
SUM OF ALL RESPONSES TO PHQ QUESTIONS 1-9: 1
9. THOUGHTS THAT YOU WOULD BE BETTER OFF DEAD, OR OF HURTING YOURSELF: NOT AT ALL
7. TROUBLE CONCENTRATING ON THINGS, SUCH AS READING THE NEWSPAPER OR WATCHING TELEVISION: NOT AT ALL
2. FEELING DOWN, DEPRESSED OR HOPELESS: NOT AT ALL
SUM OF ALL RESPONSES TO PHQ QUESTIONS 1-9: 1
1. LITTLE INTEREST OR PLEASURE IN DOING THINGS: SEVERAL DAYS
2. FEELING DOWN, DEPRESSED OR HOPELESS: NOT AT ALL
SUM OF ALL RESPONSES TO PHQ QUESTIONS 1-9: 1
5. POOR APPETITE OR OVEREATING: NOT AT ALL
10. IF YOU CHECKED OFF ANY PROBLEMS, HOW DIFFICULT HAVE THESE PROBLEMS MADE IT FOR YOU TO DO YOUR WORK, TAKE CARE OF THINGS AT HOME, OR GET ALONG WITH OTHER PEOPLE: NOT DIFFICULT AT ALL
8. MOVING OR SPEAKING SO SLOWLY THAT OTHER PEOPLE COULD HAVE NOTICED. OR THE OPPOSITE, BEING SO FIGETY OR RESTLESS THAT YOU HAVE BEEN MOVING AROUND A LOT MORE THAN USUAL: NOT AT ALL
8. MOVING OR SPEAKING SO SLOWLY THAT OTHER PEOPLE COULD HAVE NOTICED. OR THE OPPOSITE - BEING SO FIDGETY OR RESTLESS THAT YOU HAVE BEEN MOVING AROUND A LOT MORE THAN USUAL: NOT AT ALL
4. FEELING TIRED OR HAVING LITTLE ENERGY: NOT AT ALL
9. THOUGHTS THAT YOU WOULD BE BETTER OFF DEAD, OR OF HURTING YOURSELF: NOT AT ALL
SUM OF ALL RESPONSES TO PHQ QUESTIONS 1-9: 1
SUM OF ALL RESPONSES TO PHQ QUESTIONS 1-9: 1
1. LITTLE INTEREST OR PLEASURE IN DOING THINGS: SEVERAL DAYS
SUM OF ALL RESPONSES TO PHQ QUESTIONS 1-9: 1
3. TROUBLE FALLING OR STAYING ASLEEP: NOT AT ALL
6. FEELING BAD ABOUT YOURSELF - OR THAT YOU ARE A FAILURE OR HAVE LET YOURSELF OR YOUR FAMILY DOWN: NOT AT ALL
SUM OF ALL RESPONSES TO PHQ QUESTIONS 1-9: 1
2. FEELING DOWN, DEPRESSED OR HOPELESS: NOT AT ALL
SUM OF ALL RESPONSES TO PHQ QUESTIONS 1-9: 1
5. POOR APPETITE OR OVEREATING: NOT AT ALL
4. FEELING TIRED OR HAVING LITTLE ENERGY: NOT AT ALL
7. TROUBLE CONCENTRATING ON THINGS, SUCH AS READING THE NEWSPAPER OR WATCHING TELEVISION: NOT AT ALL
SUM OF ALL RESPONSES TO PHQ QUESTIONS 1-9: 1
1. LITTLE INTEREST OR PLEASURE IN DOING THINGS: SEVERAL DAYS

## 2025-06-24 ENCOUNTER — OFFICE VISIT (OUTPATIENT)
Facility: CLINIC | Age: 75
End: 2025-06-24
Payer: MEDICARE

## 2025-06-24 VITALS
OXYGEN SATURATION: 96 % | HEART RATE: 67 BPM | SYSTOLIC BLOOD PRESSURE: 130 MMHG | BODY MASS INDEX: 21.89 KG/M2 | WEIGHT: 144.4 LBS | TEMPERATURE: 98 F | DIASTOLIC BLOOD PRESSURE: 80 MMHG | HEIGHT: 68 IN

## 2025-06-24 DIAGNOSIS — Z79.01 LONG TERM (CURRENT) USE OF ANTICOAGULANTS: ICD-10-CM

## 2025-06-24 DIAGNOSIS — Z51.81 ENCOUNTER FOR THERAPEUTIC DRUG LEVEL MONITORING: ICD-10-CM

## 2025-06-24 DIAGNOSIS — I26.09 OTHER ACUTE PULMONARY EMBOLISM WITH ACUTE COR PULMONALE (HCC): ICD-10-CM

## 2025-06-24 DIAGNOSIS — I48.91 ATRIAL FIBRILLATION, UNSPECIFIED TYPE (HCC): ICD-10-CM

## 2025-06-24 DIAGNOSIS — D68.9 COAGULATION DEFICIENCY: ICD-10-CM

## 2025-06-24 DIAGNOSIS — Z79.01 WARFARIN ANTICOAGULATION: Primary | ICD-10-CM

## 2025-06-24 LAB
POC INR: 2.1
PROTHROMBIN TIME, POC: NORMAL

## 2025-06-24 PROCEDURE — 99213 OFFICE O/P EST LOW 20 MIN: CPT | Performed by: NURSE PRACTITIONER

## 2025-06-24 PROCEDURE — 1159F MED LIST DOCD IN RCRD: CPT | Performed by: NURSE PRACTITIONER

## 2025-06-24 PROCEDURE — 1160F RVW MEDS BY RX/DR IN RCRD: CPT | Performed by: NURSE PRACTITIONER

## 2025-06-24 PROCEDURE — 1123F ACP DISCUSS/DSCN MKR DOCD: CPT | Performed by: NURSE PRACTITIONER

## 2025-06-24 PROCEDURE — 85610 PROTHROMBIN TIME: CPT | Performed by: NURSE PRACTITIONER

## 2025-06-24 PROCEDURE — 3075F SYST BP GE 130 - 139MM HG: CPT | Performed by: NURSE PRACTITIONER

## 2025-06-24 PROCEDURE — 3017F COLORECTAL CA SCREEN DOC REV: CPT | Performed by: NURSE PRACTITIONER

## 2025-06-24 PROCEDURE — 1125F AMNT PAIN NOTED PAIN PRSNT: CPT | Performed by: NURSE PRACTITIONER

## 2025-06-24 PROCEDURE — G8427 DOCREV CUR MEDS BY ELIG CLIN: HCPCS | Performed by: NURSE PRACTITIONER

## 2025-06-24 PROCEDURE — G8420 CALC BMI NORM PARAMETERS: HCPCS | Performed by: NURSE PRACTITIONER

## 2025-06-24 PROCEDURE — 3079F DIAST BP 80-89 MM HG: CPT | Performed by: NURSE PRACTITIONER

## 2025-06-24 PROCEDURE — PBSHW AMB POC PT/INR: Performed by: NURSE PRACTITIONER

## 2025-06-24 PROCEDURE — 1036F TOBACCO NON-USER: CPT | Performed by: NURSE PRACTITIONER

## 2025-06-24 SDOH — ECONOMIC STABILITY: FOOD INSECURITY: WITHIN THE PAST 12 MONTHS, THE FOOD YOU BOUGHT JUST DIDN'T LAST AND YOU DIDN'T HAVE MONEY TO GET MORE.: NEVER TRUE

## 2025-06-24 SDOH — ECONOMIC STABILITY: FOOD INSECURITY: WITHIN THE PAST 12 MONTHS, YOU WORRIED THAT YOUR FOOD WOULD RUN OUT BEFORE YOU GOT MONEY TO BUY MORE.: NEVER TRUE

## 2025-06-24 NOTE — PROGRESS NOTES
Progress Note        Pt presents for INR check         Subjective:     Patient's medications, allergies, past medical, surgical, social and family histories were reviewed and updated as appropriate.    Review of Systems   All other systems reviewed and are negative.       Objective:     Vitals:    06/24/25 0956 06/24/25 1028   BP: (!) 138/92 130/80   Pulse: 67    Temp: 98 °F (36.7 °C)    SpO2: 96%    Weight: 65.5 kg (144 lb 6.4 oz)    Height: 1.727 m (5' 8\")        Physical Exam  Constitutional:       General: He is not in acute distress.     Appearance: Normal appearance. He is normal weight. He is not ill-appearing.   HENT:      Head: Normocephalic and atraumatic.      Nose: Nose normal.   Eyes:      Conjunctiva/sclera: Conjunctivae normal.   Cardiovascular:      Rate and Rhythm: Normal rate and regular rhythm.      Pulses: Normal pulses.      Heart sounds: Normal heart sounds.   Pulmonary:      Effort: Pulmonary effort is normal.      Breath sounds: Normal breath sounds.   Musculoskeletal:         General: Normal range of motion.      Cervical back: Normal range of motion and neck supple.   Skin:     General: Skin is warm and dry.   Neurological:      General: No focal deficit present.      Mental Status: He is alert and oriented to person, place, and time.   Psychiatric:         Mood and Affect: Mood normal.         Behavior: Behavior normal.         Assessment/ Plan:     1. Warfarin anticoagulation  -     AMB POC PT/INR  2. Atrial fibrillation, unspecified type (HCC)  -     AMB POC PT/INR  3. Coagulation deficiency  -     AMB POC PT/INR  4. Long term (current) use of anticoagulants  -     AMB POC PT/INR  5. Encounter for therapeutic drug level monitoring  -     AMB POC PT/INR  6. Other acute pulmonary embolism with acute cor pulmonale (HCC)  -     AMB POC PT/INR      Medication Side Effects and Warnings were discussed with patient    Return in about 4 weeks (around 7/22/2025) for LABS, RECHECK.     On this

## 2025-06-24 NOTE — PROGRESS NOTES
Jared Snow is a 74 y.o. male , id x 2(name and ). Reviewed record, history, and  medications.    Chief Complaint   Patient presents with    Coagulation Disorder     Alvin pt      Early April had an skin screen and a mole on his (L) flank came back precancerous. IT was removed and biopsied. Biopsy  was benign   Has an endo appt next month     Vitals:    25 0956   BP: (!) 138/92   Pulse: 67   Temp: 98 °F (36.7 °C)   SpO2: 96%   Weight: 65.5 kg (144 lb 6.4 oz)   Height: 1.727 m (5' 8\")       Non-Fasting    Med Review  Reviewed: Medications reviewed no changes.  Compliance: compliant with all meds  List provided: ptmedlist: yes      \"Have you been to the ER, urgent care clinic since your last visit?  Hospitalized since your last visit?\"    NO    “Have you seen or consulted any other health care providers outside of [facility] since your last visit?”    NO      Results for orders placed or performed in visit on 25   AMB POC PT/INR   Result Value Ref Range    Prothrombin time, POC      POC INR 2.1               2024     9:04 AM   Amb Fall Risk Assessment and TUG Test   Do you feel unsteady or are you worried about falling?  no   2 or more falls in past year? no   Fall with injury in past year? no         2025     5:39 PM   PHQ-9    Little interest or pleasure in doing things 1   Feeling down, depressed, or hopeless 0   PHQ-2 Score 1    PHQ-9 Total Score 1        Patient-reported         2025     5:37 PM 2025     1:54 PM   DAVID-7 SCREENING   Feeling nervous, anxious, or on edge Several days Several days   Not being able to stop or control worrying Several days Not at all   Worrying too much about different things Several days Several days   Trouble relaxing Several days Not at all   Being so restless that it is hard to sit still Several days Not at all   Becoming easily annoyed or irritable Not at all Several days   Feeling afraid as if something awful might happen Not at all

## 2025-07-23 ENCOUNTER — OFFICE VISIT (OUTPATIENT)
Facility: CLINIC | Age: 75
End: 2025-07-23
Payer: MEDICARE

## 2025-07-23 VITALS
TEMPERATURE: 97.9 F | RESPIRATION RATE: 16 BRPM | BODY MASS INDEX: 22.28 KG/M2 | WEIGHT: 147 LBS | HEIGHT: 68 IN | DIASTOLIC BLOOD PRESSURE: 89 MMHG | HEART RATE: 61 BPM | SYSTOLIC BLOOD PRESSURE: 148 MMHG | OXYGEN SATURATION: 98 %

## 2025-07-23 DIAGNOSIS — Z79.01 WARFARIN ANTICOAGULATION: Primary | ICD-10-CM

## 2025-07-23 DIAGNOSIS — E03.9 ACQUIRED HYPOTHYROIDISM: ICD-10-CM

## 2025-07-23 DIAGNOSIS — I10 PRIMARY HYPERTENSION: ICD-10-CM

## 2025-07-23 LAB
POC INR: 2.9
PROTHROMBIN TIME, POC: 35.2

## 2025-07-23 PROCEDURE — 1036F TOBACCO NON-USER: CPT

## 2025-07-23 PROCEDURE — 1123F ACP DISCUSS/DSCN MKR DOCD: CPT

## 2025-07-23 PROCEDURE — 1160F RVW MEDS BY RX/DR IN RCRD: CPT

## 2025-07-23 PROCEDURE — 3077F SYST BP >= 140 MM HG: CPT

## 2025-07-23 PROCEDURE — 3079F DIAST BP 80-89 MM HG: CPT

## 2025-07-23 PROCEDURE — G8420 CALC BMI NORM PARAMETERS: HCPCS

## 2025-07-23 PROCEDURE — G8427 DOCREV CUR MEDS BY ELIG CLIN: HCPCS

## 2025-07-23 PROCEDURE — 3017F COLORECTAL CA SCREEN DOC REV: CPT

## 2025-07-23 PROCEDURE — 1159F MED LIST DOCD IN RCRD: CPT

## 2025-07-23 PROCEDURE — 99214 OFFICE O/P EST MOD 30 MIN: CPT

## 2025-07-23 PROCEDURE — 1126F AMNT PAIN NOTED NONE PRSNT: CPT

## 2025-07-23 PROCEDURE — 85610 PROTHROMBIN TIME: CPT

## 2025-07-23 PROCEDURE — PBSHW AMB POC PT/INR

## 2025-07-23 RX ORDER — LOSARTAN POTASSIUM 100 MG/1
100 TABLET ORAL DAILY
Qty: 90 TABLET | Refills: 3 | Status: SHIPPED | OUTPATIENT
Start: 2025-07-23

## 2025-07-23 ASSESSMENT — ENCOUNTER SYMPTOMS
CONSTIPATION: 0
EYES NEGATIVE: 1
ALLERGIC/IMMUNOLOGIC NEGATIVE: 1
RHINORRHEA: 0
COUGH: 0
DIARRHEA: 0
ABDOMINAL PAIN: 0
SINUS PAIN: 0
SHORTNESS OF BREATH: 0

## 2025-07-23 NOTE — PROGRESS NOTES
Chief Complaint   Patient presents with    Office Visit for Anticoagulation Management    Hypertension     Med refill     Patient states consent for provider to use [facility] for documentation of this visit.    Results for orders placed or performed in visit on 07/23/25   AMB POC PT/INR   Result Value Ref Range    Prothrombin time, POC 35.2     POC INR 2.9        Have you been to the ER, urgent care clinic since your last visit?  Hospitalized since your last visit?   NO    Have you seen or consulted any other health care providers outside our system since your last visit?   NO

## 2025-07-23 NOTE — PROGRESS NOTES
Chief Complaint   Patient presents with    Office Visit for Anticoagulation Management    Hypertension     Med refill     Patient states consent for provider to use [facility] for documentation of this visit.    Results for orders placed or performed in visit on 25   AMB POC PT/INR   Result Value Ref Range    Prothrombin time, POC 35.2     POC INR 2.9        Have you been to the ER, urgent care clinic since your last visit?  Hospitalized since your last visit?   NO    Have you seen or consulted any other health care providers outside our system since your last visit?   NO               Jared Snow (:  1950) is a 74 y.o. male, here for evaluation of the following chief complaint(s):  Office Visit for Anticoagulation Management and Hypertension (Med refill)        Subjective   History of Present Illness  The patient presents for evaluation of COPD, anticoagulation management, and medication refill.    He is currently on a daily regimen of Coumadin 5 mg. His INR was 2.1 during the last check. He reports no changes in his diet, specifically mentioning that he has not stopped consuming greens. He notes a slight increase in weight since the last visit. He has requested a refill of his losartan prescription. He is scheduled to see an endocrinologist tomorrow for a TSH test and a bone density scan. He is still investigating Breztri. He had issues with coughing up blood in his mucus and was prescribed an antibiotic and prednisone by his pulmonologist, which resolved the issue. It was described as a mild flare-up of COPD.    Social History:  Hobbies: Kayaking    Review of Systems   Constitutional:  Negative for activity change, appetite change and fatigue.   HENT:  Negative for postnasal drip, rhinorrhea, sinus pain and sore throat.    Eyes: Negative.    Respiratory:  Negative for cough and shortness of breath.    Cardiovascular:  Negative for chest pain.   Gastrointestinal:  Negative for abdominal pain,

## 2025-08-12 SDOH — HEALTH STABILITY: PHYSICAL HEALTH: ON AVERAGE, HOW MANY MINUTES DO YOU ENGAGE IN EXERCISE AT THIS LEVEL?: 10 MIN

## 2025-08-12 SDOH — HEALTH STABILITY: PHYSICAL HEALTH: ON AVERAGE, HOW MANY DAYS PER WEEK DO YOU ENGAGE IN MODERATE TO STRENUOUS EXERCISE (LIKE A BRISK WALK)?: 1 DAY

## 2025-08-12 ASSESSMENT — LIFESTYLE VARIABLES
HOW OFTEN DO YOU HAVE SIX OR MORE DRINKS ON ONE OCCASION: 1
HAS A RELATIVE, FRIEND, DOCTOR, OR ANOTHER HEALTH PROFESSIONAL EXPRESSED CONCERN ABOUT YOUR DRINKING OR SUGGESTED YOU CUT DOWN: YES, DURING THE PAST YEAR
HAS A RELATIVE, FRIEND, DOCTOR, OR ANOTHER HEALTH PROFESSIONAL EXPRESSED CONCERN ABOUT YOUR DRINKING OR SUGGESTED YOU CUT DOWN: YES, DURING THE PAST YEAR
HOW MANY STANDARD DRINKS CONTAINING ALCOHOL DO YOU HAVE ON A TYPICAL DAY: 3 OR 4
HOW OFTEN DO YOU HAVE A DRINK CONTAINING ALCOHOL: 5
HOW OFTEN DURING THE LAST YEAR HAVE YOU HAD A FEELING OF GUILT OR REMORSE AFTER DRINKING: NEVER
HOW OFTEN DO YOU HAVE A DRINK CONTAINING ALCOHOL: 4 OR MORE TIMES A WEEK
HOW OFTEN DURING THE LAST YEAR HAVE YOU HAD A FEELING OF GUILT OR REMORSE AFTER DRINKING: NEVER
HOW OFTEN DURING THE LAST YEAR HAVE YOU BEEN UNABLE TO REMEMBER WHAT HAPPENED THE NIGHT BEFORE BECAUSE YOU HAD BEEN DRINKING: NEVER
HOW OFTEN DURING THE LAST YEAR HAVE YOU NEEDED AN ALCOHOLIC DRINK FIRST THING IN THE MORNING TO GET YOURSELF GOING AFTER A NIGHT OF HEAVY DRINKING: NEVER
HOW MANY STANDARD DRINKS CONTAINING ALCOHOL DO YOU HAVE ON A TYPICAL DAY: 2
HAVE YOU OR SOMEONE ELSE BEEN INJURED AS A RESULT OF YOUR DRINKING: NO
HOW OFTEN DURING THE LAST YEAR HAVE YOU FOUND THAT YOU WERE NOT ABLE TO STOP DRINKING ONCE YOU HAD STARTED: NEVER
HOW OFTEN DURING THE LAST YEAR HAVE YOU FOUND THAT YOU WERE NOT ABLE TO STOP DRINKING ONCE YOU HAD STARTED: NEVER
HOW OFTEN DURING THE LAST YEAR HAVE YOU BEEN UNABLE TO REMEMBER WHAT HAPPENED THE NIGHT BEFORE BECAUSE YOU HAD BEEN DRINKING: NEVER
HOW OFTEN DURING THE LAST YEAR HAVE YOU FAILED TO DO WHAT WAS NORMALLY EXPECTED FROM YOU BECAUSE OF DRINKING: NEVER
HOW OFTEN DURING THE LAST YEAR HAVE YOU NEEDED AN ALCOHOLIC DRINK FIRST THING IN THE MORNING TO GET YOURSELF GOING AFTER A NIGHT OF HEAVY DRINKING: NEVER
HAVE YOU OR SOMEONE ELSE BEEN INJURED AS A RESULT OF YOUR DRINKING: NO
HOW OFTEN DURING THE LAST YEAR HAVE YOU FAILED TO DO WHAT WAS NORMALLY EXPECTED FROM YOU BECAUSE OF DRINKING: NEVER

## 2025-08-12 ASSESSMENT — PATIENT HEALTH QUESTIONNAIRE - PHQ9
2. FEELING DOWN, DEPRESSED OR HOPELESS: NOT AT ALL
SUM OF ALL RESPONSES TO PHQ QUESTIONS 1-9: 0
1. LITTLE INTEREST OR PLEASURE IN DOING THINGS: NOT AT ALL
SUM OF ALL RESPONSES TO PHQ QUESTIONS 1-9: 0

## 2025-08-20 ENCOUNTER — OFFICE VISIT (OUTPATIENT)
Facility: CLINIC | Age: 75
End: 2025-08-20
Payer: MEDICARE

## 2025-08-20 VITALS
DIASTOLIC BLOOD PRESSURE: 90 MMHG | WEIGHT: 148.3 LBS | BODY MASS INDEX: 22.48 KG/M2 | RESPIRATION RATE: 18 BRPM | HEIGHT: 68 IN | SYSTOLIC BLOOD PRESSURE: 142 MMHG | HEART RATE: 3 BPM | OXYGEN SATURATION: 99 %

## 2025-08-20 DIAGNOSIS — Z00.01 ENCOUNTER FOR GENERAL ADULT MEDICAL EXAMINATION WITH ABNORMAL FINDINGS: ICD-10-CM

## 2025-08-20 DIAGNOSIS — Z95.828 PRESENCE OF IVC FILTER: ICD-10-CM

## 2025-08-20 DIAGNOSIS — Z79.01 ANTICOAGULANT LONG-TERM USE: Primary | ICD-10-CM

## 2025-08-20 DIAGNOSIS — R53.83 FATIGUE, UNSPECIFIED TYPE: ICD-10-CM

## 2025-08-20 DIAGNOSIS — Z00.00 PHYSICAL EXAM: ICD-10-CM

## 2025-08-20 DIAGNOSIS — E07.9 THYROID DISEASE: ICD-10-CM

## 2025-08-20 DIAGNOSIS — I10 PRIMARY HYPERTENSION: ICD-10-CM

## 2025-08-20 DIAGNOSIS — K21.9 GASTROESOPHAGEAL REFLUX DISEASE WITHOUT ESOPHAGITIS: ICD-10-CM

## 2025-08-20 DIAGNOSIS — Z79.01 WARFARIN ANTICOAGULATION: ICD-10-CM

## 2025-08-20 DIAGNOSIS — Z13.220 SCREENING FOR HYPERLIPIDEMIA: ICD-10-CM

## 2025-08-20 DIAGNOSIS — Z13.1 ENCOUNTER FOR SCREENING FOR DIABETES MELLITUS: ICD-10-CM

## 2025-08-20 DIAGNOSIS — E03.9 ACQUIRED HYPOTHYROIDISM: ICD-10-CM

## 2025-08-20 DIAGNOSIS — Z00.00 MEDICARE ANNUAL WELLNESS VISIT, SUBSEQUENT: ICD-10-CM

## 2025-08-20 LAB
POC INR: 2.5
PROTHROMBIN TIME, POC: 29.7

## 2025-08-20 PROCEDURE — 1123F ACP DISCUSS/DSCN MKR DOCD: CPT

## 2025-08-20 PROCEDURE — 1160F RVW MEDS BY RX/DR IN RCRD: CPT

## 2025-08-20 PROCEDURE — 3077F SYST BP >= 140 MM HG: CPT

## 2025-08-20 PROCEDURE — G8427 DOCREV CUR MEDS BY ELIG CLIN: HCPCS

## 2025-08-20 PROCEDURE — 99214 OFFICE O/P EST MOD 30 MIN: CPT

## 2025-08-20 PROCEDURE — 3080F DIAST BP >= 90 MM HG: CPT

## 2025-08-20 PROCEDURE — 1159F MED LIST DOCD IN RCRD: CPT

## 2025-08-20 PROCEDURE — 3017F COLORECTAL CA SCREEN DOC REV: CPT

## 2025-08-20 PROCEDURE — 1036F TOBACCO NON-USER: CPT

## 2025-08-20 PROCEDURE — 85610 PROTHROMBIN TIME: CPT

## 2025-08-20 PROCEDURE — PBSHW AMB POC PT/INR

## 2025-08-20 PROCEDURE — G8420 CALC BMI NORM PARAMETERS: HCPCS

## 2025-08-20 PROCEDURE — G0439 PPPS, SUBSEQ VISIT: HCPCS

## 2025-08-20 ASSESSMENT — ENCOUNTER SYMPTOMS
ABDOMINAL PAIN: 0
ALLERGIC/IMMUNOLOGIC NEGATIVE: 1
COUGH: 0
CONSTIPATION: 0
SHORTNESS OF BREATH: 0
DIARRHEA: 0
EYES NEGATIVE: 1
SORE THROAT: 0
SINUS PAIN: 0
RHINORRHEA: 0

## 2025-08-20 ASSESSMENT — PATIENT HEALTH QUESTIONNAIRE - PHQ9
1. LITTLE INTEREST OR PLEASURE IN DOING THINGS: NOT AT ALL
SUM OF ALL RESPONSES TO PHQ QUESTIONS 1-9: 0
7. TROUBLE CONCENTRATING ON THINGS, SUCH AS READING THE NEWSPAPER OR WATCHING TELEVISION: NOT AT ALL
9. THOUGHTS THAT YOU WOULD BE BETTER OFF DEAD, OR OF HURTING YOURSELF: NOT AT ALL
SUM OF ALL RESPONSES TO PHQ QUESTIONS 1-9: 0
SUM OF ALL RESPONSES TO PHQ QUESTIONS 1-9: 0
8. MOVING OR SPEAKING SO SLOWLY THAT OTHER PEOPLE COULD HAVE NOTICED. OR THE OPPOSITE, BEING SO FIGETY OR RESTLESS THAT YOU HAVE BEEN MOVING AROUND A LOT MORE THAN USUAL: NOT AT ALL
SUM OF ALL RESPONSES TO PHQ QUESTIONS 1-9: 0
4. FEELING TIRED OR HAVING LITTLE ENERGY: NOT AT ALL
6. FEELING BAD ABOUT YOURSELF - OR THAT YOU ARE A FAILURE OR HAVE LET YOURSELF OR YOUR FAMILY DOWN: NOT AT ALL
5. POOR APPETITE OR OVEREATING: NOT AT ALL
2. FEELING DOWN, DEPRESSED OR HOPELESS: NOT AT ALL
10. IF YOU CHECKED OFF ANY PROBLEMS, HOW DIFFICULT HAVE THESE PROBLEMS MADE IT FOR YOU TO DO YOUR WORK, TAKE CARE OF THINGS AT HOME, OR GET ALONG WITH OTHER PEOPLE: NOT DIFFICULT AT ALL
3. TROUBLE FALLING OR STAYING ASLEEP: NOT AT ALL

## 2025-08-21 LAB
ALBUMIN SERPL-MCNC: 4.2 G/DL (ref 3.5–5.2)
ALBUMIN/GLOB SERPL: 1.6 (ref 1.1–2.2)
ALP SERPL-CCNC: 60 U/L (ref 40–129)
ALT SERPL-CCNC: 18 U/L (ref 10–50)
ANION GAP SERPL CALC-SCNC: 11 MMOL/L (ref 2–14)
AST SERPL-CCNC: 24 U/L (ref 10–50)
BILIRUB SERPL-MCNC: 0.6 MG/DL (ref 0–1.2)
BUN SERPL-MCNC: 22 MG/DL (ref 8–23)
BUN/CREAT SERPL: 20 (ref 12–20)
CALCIUM SERPL-MCNC: 9.9 MG/DL (ref 8.8–10.2)
CHLORIDE SERPL-SCNC: 101 MMOL/L (ref 98–107)
CHOLEST SERPL-MCNC: 134 MG/DL (ref 0–200)
CO2 SERPL-SCNC: 26 MMOL/L (ref 20–29)
CREAT SERPL-MCNC: 1.11 MG/DL (ref 0.7–1.2)
ERYTHROCYTE [DISTWIDTH] IN BLOOD BY AUTOMATED COUNT: 12.2 % (ref 11.5–14.5)
EST. AVERAGE GLUCOSE BLD GHB EST-MCNC: 107 MG/DL
GLOBULIN SER CALC-MCNC: 2.6 G/DL (ref 2–4)
GLUCOSE SERPL-MCNC: 73 MG/DL (ref 65–100)
HBA1C MFR BLD: 5.3 % (ref 4–5.6)
HCT VFR BLD AUTO: 40.3 % (ref 36.6–50.3)
HDLC SERPL-MCNC: 57 MG/DL (ref 40–60)
HDLC SERPL: 2.4 (ref 0–5)
HGB BLD-MCNC: 13.7 G/DL (ref 12.1–17)
LDLC SERPL CALC-MCNC: 63 MG/DL (ref 0–100)
MCH RBC QN AUTO: 33 PG (ref 26–34)
MCHC RBC AUTO-ENTMCNC: 34 G/DL (ref 30–36.5)
MCV RBC AUTO: 97.1 FL (ref 80–99)
NRBC # BLD: 0 K/UL (ref 0–0.01)
NRBC BLD-RTO: 0 PER 100 WBC
PLATELET # BLD AUTO: 250 K/UL (ref 150–400)
PMV BLD AUTO: 12 FL (ref 8.9–12.9)
POTASSIUM SERPL-SCNC: 5 MMOL/L (ref 3.5–5.1)
PROT SERPL-MCNC: 6.8 G/DL (ref 6.4–8.3)
RBC # BLD AUTO: 4.15 M/UL (ref 4.1–5.7)
SODIUM SERPL-SCNC: 137 MMOL/L (ref 136–145)
TRIGL SERPL-MCNC: 74 MG/DL (ref 0–150)
TSH, 3RD GENERATION: 2.49 UIU/ML (ref 0.27–4.2)
VLDLC SERPL CALC-MCNC: 15 MG/DL
WBC # BLD AUTO: 7.9 K/UL (ref 4.1–11.1)